# Patient Record
Sex: MALE | Race: WHITE
[De-identification: names, ages, dates, MRNs, and addresses within clinical notes are randomized per-mention and may not be internally consistent; named-entity substitution may affect disease eponyms.]

---

## 2017-03-22 ENCOUNTER — HOSPITAL ENCOUNTER (OUTPATIENT)
Dept: HOSPITAL 47 - RADCTMAIN | Age: 47
Discharge: HOME | End: 2017-03-22
Payer: COMMERCIAL

## 2017-03-22 DIAGNOSIS — S09.90XA: Primary | ICD-10-CM

## 2017-03-22 PROCEDURE — 70450 CT HEAD/BRAIN W/O DYE: CPT

## 2017-03-22 NOTE — CT
EXAMINATION TYPE: CT brain wo con

 

DATE OF EXAM: 3/22/2017 5:33 PM

 

COMPARISON: 3/17/2010

 

HISTORY: Right supraorbital injury 3 1/2 weeks ago.

 

CT DLP: 1084.80 mGycm

Automated exposure control for dose reduction was used.

 

FINDINGS: 

Ventricles and sulci appear normal. There is no mass effect nor midline shift. There is no sign of in
tracranial hemorrhage. The calvarium is intact.

 

IMPRESSION: 

Negative unenhanced head CT scan. No change. No evidence of left orbital abnormality.

## 2021-03-31 ENCOUNTER — HOSPITAL ENCOUNTER (INPATIENT)
Dept: HOSPITAL 47 - EC | Age: 51
LOS: 22 days | DRG: 871 | End: 2021-04-22
Attending: FAMILY MEDICINE | Admitting: FAMILY MEDICINE
Payer: COMMERCIAL

## 2021-03-31 VITALS — BODY MASS INDEX: 30.1 KG/M2

## 2021-03-31 DIAGNOSIS — Z88.2: ICD-10-CM

## 2021-03-31 DIAGNOSIS — R57.1: ICD-10-CM

## 2021-03-31 DIAGNOSIS — U07.1: ICD-10-CM

## 2021-03-31 DIAGNOSIS — B37.0: ICD-10-CM

## 2021-03-31 DIAGNOSIS — J30.2: ICD-10-CM

## 2021-03-31 DIAGNOSIS — N40.1: ICD-10-CM

## 2021-03-31 DIAGNOSIS — F41.0: ICD-10-CM

## 2021-03-31 DIAGNOSIS — Z98.890: ICD-10-CM

## 2021-03-31 DIAGNOSIS — E87.4: ICD-10-CM

## 2021-03-31 DIAGNOSIS — R74.8: ICD-10-CM

## 2021-03-31 DIAGNOSIS — Z66: ICD-10-CM

## 2021-03-31 DIAGNOSIS — A41.01: ICD-10-CM

## 2021-03-31 DIAGNOSIS — N17.0: ICD-10-CM

## 2021-03-31 DIAGNOSIS — Z87.19: ICD-10-CM

## 2021-03-31 DIAGNOSIS — I26.99: ICD-10-CM

## 2021-03-31 DIAGNOSIS — Z88.0: ICD-10-CM

## 2021-03-31 DIAGNOSIS — Z80.1: ICD-10-CM

## 2021-03-31 DIAGNOSIS — J93.9: ICD-10-CM

## 2021-03-31 DIAGNOSIS — Z87.01: ICD-10-CM

## 2021-03-31 DIAGNOSIS — Z80.8: ICD-10-CM

## 2021-03-31 DIAGNOSIS — Z90.49: ICD-10-CM

## 2021-03-31 DIAGNOSIS — G62.81: ICD-10-CM

## 2021-03-31 DIAGNOSIS — M54.5: ICD-10-CM

## 2021-03-31 DIAGNOSIS — D89.834: ICD-10-CM

## 2021-03-31 DIAGNOSIS — K21.9: ICD-10-CM

## 2021-03-31 DIAGNOSIS — E87.5: ICD-10-CM

## 2021-03-31 DIAGNOSIS — D72.810: ICD-10-CM

## 2021-03-31 DIAGNOSIS — E66.9: ICD-10-CM

## 2021-03-31 DIAGNOSIS — G89.29: ICD-10-CM

## 2021-03-31 DIAGNOSIS — Z79.899: ICD-10-CM

## 2021-03-31 DIAGNOSIS — J80: ICD-10-CM

## 2021-03-31 DIAGNOSIS — J15.211: ICD-10-CM

## 2021-03-31 DIAGNOSIS — Z80.41: ICD-10-CM

## 2021-03-31 DIAGNOSIS — I48.0: ICD-10-CM

## 2021-03-31 DIAGNOSIS — R39.11: ICD-10-CM

## 2021-03-31 DIAGNOSIS — J12.82: ICD-10-CM

## 2021-03-31 DIAGNOSIS — Z71.3: ICD-10-CM

## 2021-03-31 DIAGNOSIS — R65.21: ICD-10-CM

## 2021-03-31 DIAGNOSIS — A41.89: Primary | ICD-10-CM

## 2021-03-31 DIAGNOSIS — Z87.891: ICD-10-CM

## 2021-03-31 LAB
APTT BLD: 25.6 SEC (ref 22–30)
BASOPHILS # BLD AUTO: 0 K/UL (ref 0–0.2)
BASOPHILS NFR BLD AUTO: 0 %
EOSINOPHIL # BLD AUTO: 0 K/UL (ref 0–0.7)
EOSINOPHIL NFR BLD AUTO: 0 %
ERYTHROCYTE [DISTWIDTH] IN BLOOD BY AUTOMATED COUNT: 5.27 M/UL (ref 4.3–5.9)
ERYTHROCYTE [DISTWIDTH] IN BLOOD: 13.2 % (ref 11.5–15.5)
HCT VFR BLD AUTO: 45 % (ref 39–53)
HGB BLD-MCNC: 15 GM/DL (ref 13–17.5)
INR PPP: 0.9 (ref ?–1.2)
LYMPHOCYTES # SPEC AUTO: 0.8 K/UL (ref 1–4.8)
LYMPHOCYTES NFR SPEC AUTO: 13 %
MCH RBC QN AUTO: 28.5 PG (ref 25–35)
MCHC RBC AUTO-ENTMCNC: 33.3 G/DL (ref 31–37)
MCV RBC AUTO: 85.4 FL (ref 80–100)
MONOCYTES # BLD AUTO: 0.3 K/UL (ref 0–1)
MONOCYTES NFR BLD AUTO: 6 %
NEUTROPHILS # BLD AUTO: 4.6 K/UL (ref 1.3–7.7)
NEUTROPHILS NFR BLD AUTO: 79 %
PLATELET # BLD AUTO: 202 K/UL (ref 150–450)
PT BLD: 10.1 SEC (ref 9–12)
WBC # BLD AUTO: 5.8 K/UL (ref 3.8–10.6)

## 2021-03-31 PROCEDURE — 82805 BLOOD GASES W/O2 SATURATION: CPT

## 2021-03-31 PROCEDURE — 85610 PROTHROMBIN TIME: CPT

## 2021-03-31 PROCEDURE — 86900 BLOOD TYPING SEROLOGIC ABO: CPT

## 2021-03-31 PROCEDURE — 94003 VENT MGMT INPAT SUBQ DAY: CPT

## 2021-03-31 PROCEDURE — 99291 CRITICAL CARE FIRST HOUR: CPT

## 2021-03-31 PROCEDURE — 85730 THROMBOPLASTIN TIME PARTIAL: CPT

## 2021-03-31 PROCEDURE — 84100 ASSAY OF PHOSPHORUS: CPT

## 2021-03-31 PROCEDURE — 94002 VENT MGMT INPAT INIT DAY: CPT

## 2021-03-31 PROCEDURE — 82330 ASSAY OF CALCIUM: CPT

## 2021-03-31 PROCEDURE — 83605 ASSAY OF LACTIC ACID: CPT

## 2021-03-31 PROCEDURE — 94640 AIRWAY INHALATION TREATMENT: CPT

## 2021-03-31 PROCEDURE — 80202 ASSAY OF VANCOMYCIN: CPT

## 2021-03-31 PROCEDURE — 93306 TTE W/DOPPLER COMPLETE: CPT

## 2021-03-31 PROCEDURE — 82553 CREATINE MB FRACTION: CPT

## 2021-03-31 PROCEDURE — 87340 HEPATITIS B SURFACE AG IA: CPT

## 2021-03-31 PROCEDURE — 87070 CULTURE OTHR SPECIMN AEROBIC: CPT

## 2021-03-31 PROCEDURE — 83615 LACTATE (LD) (LDH) ENZYME: CPT

## 2021-03-31 PROCEDURE — 36410 VNPNXR 3YR/> PHY/QHP DX/THER: CPT

## 2021-03-31 PROCEDURE — 80048 BASIC METABOLIC PNL TOTAL CA: CPT

## 2021-03-31 PROCEDURE — 84145 PROCALCITONIN (PCT): CPT

## 2021-03-31 PROCEDURE — 83735 ASSAY OF MAGNESIUM: CPT

## 2021-03-31 PROCEDURE — 83625 ASSAY OF LDH ENZYMES: CPT

## 2021-03-31 PROCEDURE — 87077 CULTURE AEROBIC IDENTIFY: CPT

## 2021-03-31 PROCEDURE — 93970 EXTREMITY STUDY: CPT

## 2021-03-31 PROCEDURE — 85379 FIBRIN DEGRADATION QUANT: CPT

## 2021-03-31 PROCEDURE — 90935 HEMODIALYSIS ONE EVALUATION: CPT

## 2021-03-31 PROCEDURE — 85384 FIBRINOGEN ACTIVITY: CPT

## 2021-03-31 PROCEDURE — 84132 ASSAY OF SERUM POTASSIUM: CPT

## 2021-03-31 PROCEDURE — 86850 RBC ANTIBODY SCREEN: CPT

## 2021-03-31 PROCEDURE — 86901 BLOOD TYPING SEROLOGIC RH(D): CPT

## 2021-03-31 PROCEDURE — 87186 SC STD MICRODIL/AGAR DIL: CPT

## 2021-03-31 PROCEDURE — 85027 COMPLETE CBC AUTOMATED: CPT

## 2021-03-31 PROCEDURE — 86140 C-REACTIVE PROTEIN: CPT

## 2021-03-31 PROCEDURE — 84478 ASSAY OF TRIGLYCERIDES: CPT

## 2021-03-31 PROCEDURE — 86704 HEP B CORE ANTIBODY TOTAL: CPT

## 2021-03-31 PROCEDURE — 80053 COMPREHEN METABOLIC PANEL: CPT

## 2021-03-31 PROCEDURE — 85025 COMPLETE CBC W/AUTO DIFF WBC: CPT

## 2021-03-31 PROCEDURE — 94760 N-INVAS EAR/PLS OXIMETRY 1: CPT

## 2021-03-31 PROCEDURE — 86706 HEP B SURFACE ANTIBODY: CPT

## 2021-03-31 PROCEDURE — 94660 CPAP INITIATION&MGMT: CPT

## 2021-03-31 PROCEDURE — 82306 VITAMIN D 25 HYDROXY: CPT

## 2021-03-31 PROCEDURE — 93005 ELECTROCARDIOGRAM TRACING: CPT

## 2021-03-31 PROCEDURE — 36600 WITHDRAWAL OF ARTERIAL BLOOD: CPT

## 2021-03-31 PROCEDURE — 82550 ASSAY OF CK (CPK): CPT

## 2021-03-31 PROCEDURE — 87205 SMEAR GRAM STAIN: CPT

## 2021-03-31 PROCEDURE — 87040 BLOOD CULTURE FOR BACTERIA: CPT

## 2021-03-31 PROCEDURE — 87635 SARS-COV-2 COVID-19 AMP PRB: CPT

## 2021-03-31 PROCEDURE — 76937 US GUIDE VASCULAR ACCESS: CPT

## 2021-03-31 PROCEDURE — 36573 INSJ PICC RS&I 5 YR+: CPT

## 2021-03-31 PROCEDURE — 82728 ASSAY OF FERRITIN: CPT

## 2021-03-31 PROCEDURE — 71045 X-RAY EXAM CHEST 1 VIEW: CPT

## 2021-03-31 NOTE — ED
SOB HPI





- General


Chief Complaint: Shortness of Breath


Stated Complaint: SOB


Time Seen by Provider: 21 21:24


Source: patient, EMS, RN notes reviewed, old records reviewed


Mode of arrival: EMS





- History of Present Illness


Initial Comments: 





This is a 50-year-old female DF for evaluation about a week of symptoms known 

coronavirus, severe shortness of breath bodyaches pains weakness.  Cough 

congestion and not feeling well.  Patient states his cannot catch his breath.  

Severe body aches and pains.  Patient has no real medical history


MD Complaint: shortness of breath, cough, anxiety


-: days(s), week(s)


Radiation: back


Severity: moderate


Severity scale (1-10): 5


Quality: aching, throbbing


Consistency: constant


Improves With: nothing


Worsens With: nothing


Known History Of: other (none)


Context: recent URI, recent illness


Associated Symptoms: fever, cough, sputum production


Treatments Prior to Arrival: none





- Related Data


                                Home Medications











 Medication  Instructions  Recorded  Confirmed


 


Acetaminophen [Tylenol Arthritis] 650 mg PO Q4H PRN 21


 


Azithromycin [Zithromax Z-pack (6 See Taper PO AS DIRECTED 21





tabs)]   


 


Loratadine [Claritin] 10 mg PO DAILY 21


 


Ondansetron HCl [Zofran] 4 mg PO Q8H PRN 21


 


Tamsulosin HCl [Flomax] 0.4 mg PO DAILY 21


 


methylPREDNISolone Dose Pack See Taper PO AS DIRECTED 21





[Medrol Dose Pack]   











                                    Allergies











Allergy/AdvReac Type Severity Reaction Status Date / Time


 


amoxicillin Allergy  Rash/Hives Verified 21 22:23


 


Sulfa (Sulfonamide AdvReac  Anaphylaxis Verified 21 22:23





Antibiotics)     














Review of Systems


ROS Statement: 


Those systems with pertinent positive or pertinent negative responses have been 

documented in the HPI.





ROS Other: All systems not noted in ROS Statement are negative.





Past Medical History


History of Any Multi-Drug Resistant Organisms: None Reported


Past Surgical History: Cholecystectomy


Past Psychological History: No Psychological Hx Reported


Smoking Status: Former smoker


Past Alcohol Use History: None Reported


Past Drug Use History: None Reported





- Past Family History


  ** Father


Family Medical History: No Reported History





  ** Mother


Family Medical History: Cancer


Additional Family Medical History / Comment(s): Mother had magdiel/ovarian and bone

cancer.  She is .





General Exam


General appearance: alert, anxious, in distress


Head exam: Present: atraumatic, normocephalic, normal inspection


Eye exam: Present: normal appearance, PERRL, EOMI.  Absent: scleral icterus, 

conjunctival injection, periorbital swelling


ENT exam: Present: normal exam, mucous membranes moist


Neck exam: Present: normal inspection.  Absent: tenderness, meningismus, 

lymphadenopathy


Respiratory exam: Present: normal lung sounds bilaterally.  Absent: respiratory 

distress, wheezes, rales, rhonchi, stridor


Cardiovascular Exam: Present: regular rate, normal rhythm, normal heart sounds. 

Absent: systolic murmur, diastolic murmur, rubs, gallop, clicks


GI/Abdominal exam: Present: soft, normal bowel sounds.  Absent: distended, 

tenderness, guarding, rebound, rigid


Extremities exam: Present: normal inspection, full ROM, normal capillary refill.

 Absent: tenderness, pedal edema, joint swelling, calf tenderness


Back exam: Present: normal inspection


Neurological exam: Present: alert, oriented X3, CN II-XII intact


Psychiatric exam: Present: normal affect, normal mood


Skin exam: Present: warm, dry, intact, normal color.  Absent: rash





Course


                                   Vital Signs











  21





  21:34 21:44 05:24


 


Temperature 101.1 F H  97.8 F


 


Pulse Rate 84  71


 


Pulse Rate [   





Right Pulse   





Oximetery]   


 


Respiratory 23 19 22





Rate   


 


Blood Pressure 111/79  130/90


 


Blood Pressure   





[Left Arm]   


 


O2 Sat by Pulse 94 L  93 L





Oximetry   














  21





  08:00 13:47 16:06


 


Temperature 98.8 F 98.1 F 


 


Pulse Rate   


 


Pulse Rate [ 70 67 





Right Pulse   





Oximetery]   


 


Respiratory 20 20 





Rate   


 


Blood Pressure   


 


Blood Pressure 124/77 125/81 





[Left Arm]   


 


O2 Sat by Pulse 93 L 95 91 L





Oximetry   














  21





  18:05


 


Temperature 98.3 F


 


Pulse Rate 


 


Pulse Rate [ 82





Right Pulse 





Oximetery] 


 


Respiratory 20





Rate 


 


Blood Pressure 


 


Blood Pressure 122/78





[Left Arm] 


 


O2 Sat by Pulse 90 L





Oximetry 














- Reevaluation(s)


Reevaluation #1: 





Medical record is reviewed





Patient has has no improvement in symptoms here in the ER





Spoke patient regarding findings and results questions answered





Medical Decision Making





- Medical Decision Making





50 male DF for evaluation positive coronavirus with hypoxia.  Coronavirus 

pneumonia patient be admitted for persistent evaluation treatment and 

management, supportive care for coronavirus





- Lab Data


Result diagrams: 


                                 21 09:11





                                 21 09:11


                                   Lab Results











  21 Range/Units





  22:38 22:38 22:38 


 


WBC  5.8    (3.8-10.6)  k/uL


 


RBC  5.27    (4.30-5.90)  m/uL


 


Hgb  15.0    (13.0-17.5)  gm/dL


 


Hct  45.0    (39.0-53.0)  %


 


MCV  85.4    (80.0-100.0)  fL


 


MCH  28.5    (25.0-35.0)  pg


 


MCHC  33.3    (31.0-37.0)  g/dL


 


RDW  13.2    (11.5-15.5)  %


 


Plt Count  202    (150-450)  k/uL


 


MPV  7.7    


 


Neutrophils %  79    %


 


Lymphocytes %  13    %


 


Monocytes %  6    %


 


Eosinophils %  0    %


 


Basophils %  0    %


 


Neutrophils #  4.6    (1.3-7.7)  k/uL


 


Lymphocytes #  0.8 L    (1.0-4.8)  k/uL


 


Monocytes #  0.3    (0-1.0)  k/uL


 


Eosinophils #  0.0    (0-0.7)  k/uL


 


Basophils #  0.0    (0-0.2)  k/uL


 


PT   10.1   (9.0-12.0)  sec


 


INR   0.9   (<1.2)  


 


APTT   25.6   (22.0-30.0)  sec


 


Sodium    137  (137-145)  mmol/L


 


Potassium    3.7  (3.5-5.1)  mmol/L


 


Chloride    102  ()  mmol/L


 


Carbon Dioxide    25  (22-30)  mmol/L


 


Anion Gap    10  mmol/L


 


BUN    17  (9-20)  mg/dL


 


Creatinine    0.91  (0.66-1.25)  mg/dL


 


Est GFR (CKD-EPI)AfAm    >90  (>60 ml/min/1.73 sqM)  


 


Est GFR (CKD-EPI)NonAf    >90  (>60 ml/min/1.73 sqM)  


 


Glucose    106 H  (74-99)  mg/dL


 


Plasma Lactic Acid Emanuel     (0.7-2.0)  mmol/L


 


Calcium    8.8  (8.4-10.2)  mg/dL


 


Magnesium    1.6  (1.6-2.3)  mg/dL


 


Total Bilirubin    0.7  (0.2-1.3)  mg/dL


 


AST    102 H  (17-59)  U/L


 


ALT    107 H  (4-49)  U/L


 


Alkaline Phosphatase    67  ()  U/L


 


Lactate Dehydrogenase    710 H  (313-618)  U/L


 


C-Reactive Protein    133.9 H  (<10.0)  mg/L


 


Total Protein    7.2  (6.3-8.2)  g/dL


 


Albumin    3.9  (3.5-5.0)  g/dL














  21 Range/Units





  22:38 


 


WBC   (3.8-10.6)  k/uL


 


RBC   (4.30-5.90)  m/uL


 


Hgb   (13.0-17.5)  gm/dL


 


Hct   (39.0-53.0)  %


 


MCV   (80.0-100.0)  fL


 


MCH   (25.0-35.0)  pg


 


MCHC   (31.0-37.0)  g/dL


 


RDW   (11.5-15.5)  %


 


Plt Count   (150-450)  k/uL


 


MPV   


 


Neutrophils %   %


 


Lymphocytes %   %


 


Monocytes %   %


 


Eosinophils %   %


 


Basophils %   %


 


Neutrophils #   (1.3-7.7)  k/uL


 


Lymphocytes #   (1.0-4.8)  k/uL


 


Monocytes #   (0-1.0)  k/uL


 


Eosinophils #   (0-0.7)  k/uL


 


Basophils #   (0-0.2)  k/uL


 


PT   (9.0-12.0)  sec


 


INR   (<1.2)  


 


APTT   (22.0-30.0)  sec


 


Sodium   (137-145)  mmol/L


 


Potassium   (3.5-5.1)  mmol/L


 


Chloride   ()  mmol/L


 


Carbon Dioxide   (22-30)  mmol/L


 


Anion Gap   mmol/L


 


BUN   (9-20)  mg/dL


 


Creatinine   (0.66-1.25)  mg/dL


 


Est GFR (CKD-EPI)AfAm   (>60 ml/min/1.73 sqM)  


 


Est GFR (CKD-EPI)NonAf   (>60 ml/min/1.73 sqM)  


 


Glucose   (74-99)  mg/dL


 


Plasma Lactic Acid Emanuel  1.6  (0.7-2.0)  mmol/L


 


Calcium   (8.4-10.2)  mg/dL


 


Magnesium   (1.6-2.3)  mg/dL


 


Total Bilirubin   (0.2-1.3)  mg/dL


 


AST   (17-59)  U/L


 


ALT   (4-49)  U/L


 


Alkaline Phosphatase   ()  U/L


 


Lactate Dehydrogenase   (313-618)  U/L


 


C-Reactive Protein   (<10.0)  mg/L


 


Total Protein   (6.3-8.2)  g/dL


 


Albumin   (3.5-5.0)  g/dL














- EKG Data


-: EKG Interpreted by Me (EKG shows sinus rhythm 80   )





- Radiology Data


Radiology results: report reviewed (Chest x-rays positive for coronavirus with 

pneumonia), image reviewed





Critical Care Time


Critical Care Time: Yes


Total Critical Care Time: 31





Disposition


Clinical Impression: 


 Pneumonia due to COVID-19 virus, Fever, Hypoxia, Coronavirus infection





Disposition: ADMITTED AS IP TO THIS HOSP


Condition: Fair


Is patient prescribed a controlled substance at d/c from ED?: No

## 2021-04-01 LAB
ALBUMIN SERPL-MCNC: 3.9 G/DL (ref 3.5–5)
ALBUMIN SERPL-MCNC: 3.9 G/DL (ref 3.5–5)
ALP SERPL-CCNC: 63 U/L (ref 38–126)
ALP SERPL-CCNC: 67 U/L (ref 38–126)
ALT SERPL-CCNC: 104 U/L (ref 4–49)
ALT SERPL-CCNC: 107 U/L (ref 4–49)
ANION GAP SERPL CALC-SCNC: 10 MMOL/L
ANION GAP SERPL CALC-SCNC: 8 MMOL/L
AST SERPL-CCNC: 102 U/L (ref 17–59)
AST SERPL-CCNC: 96 U/L (ref 17–59)
BASOPHILS # BLD AUTO: 0 K/UL (ref 0–0.2)
BASOPHILS NFR BLD AUTO: 0 %
BUN SERPL-SCNC: 17 MG/DL (ref 9–20)
BUN SERPL-SCNC: 17 MG/DL (ref 9–20)
CALCIUM SPEC-MCNC: 8.7 MG/DL (ref 8.4–10.2)
CALCIUM SPEC-MCNC: 8.8 MG/DL (ref 8.4–10.2)
CHLORIDE SERPL-SCNC: 102 MMOL/L (ref 98–107)
CHLORIDE SERPL-SCNC: 104 MMOL/L (ref 98–107)
CO2 SERPL-SCNC: 24 MMOL/L (ref 22–30)
CO2 SERPL-SCNC: 25 MMOL/L (ref 22–30)
EOSINOPHIL # BLD AUTO: 0 K/UL (ref 0–0.7)
EOSINOPHIL NFR BLD AUTO: 1 %
ERYTHROCYTE [DISTWIDTH] IN BLOOD BY AUTOMATED COUNT: 5.35 M/UL (ref 4.3–5.9)
ERYTHROCYTE [DISTWIDTH] IN BLOOD: 13 % (ref 11.5–15.5)
GLUCOSE SERPL-MCNC: 106 MG/DL (ref 74–99)
GLUCOSE SERPL-MCNC: 154 MG/DL (ref 74–99)
HCT VFR BLD AUTO: 46.2 % (ref 39–53)
HGB BLD-MCNC: 16 GM/DL (ref 13–17.5)
LDH SPEC-CCNC: 710 U/L (ref 313–618)
LDH SPEC-CCNC: 723 U/L (ref 313–618)
LYMPHOCYTES # SPEC AUTO: 0.4 K/UL (ref 1–4.8)
LYMPHOCYTES NFR SPEC AUTO: 10 %
MAGNESIUM SPEC-SCNC: 1.6 MG/DL (ref 1.6–2.3)
MCH RBC QN AUTO: 29.8 PG (ref 25–35)
MCHC RBC AUTO-ENTMCNC: 34.5 G/DL (ref 31–37)
MCV RBC AUTO: 86.4 FL (ref 80–100)
MONOCYTES # BLD AUTO: 0.2 K/UL (ref 0–1)
MONOCYTES NFR BLD AUTO: 4 %
NEUTROPHILS # BLD AUTO: 3.5 K/UL (ref 1.3–7.7)
NEUTROPHILS NFR BLD AUTO: 84 %
PLATELET # BLD AUTO: 201 K/UL (ref 150–450)
POTASSIUM SERPL-SCNC: 3.7 MMOL/L (ref 3.5–5.1)
POTASSIUM SERPL-SCNC: 4.4 MMOL/L (ref 3.5–5.1)
PROT SERPL-MCNC: 7.2 G/DL (ref 6.3–8.2)
PROT SERPL-MCNC: 7.3 G/DL (ref 6.3–8.2)
SODIUM SERPL-SCNC: 136 MMOL/L (ref 137–145)
SODIUM SERPL-SCNC: 137 MMOL/L (ref 137–145)
WBC # BLD AUTO: 4.2 K/UL (ref 3.8–10.6)

## 2021-04-01 PROCEDURE — 5A0945A ASSISTANCE WITH RESPIRATORY VENTILATION, 24-96 CONSECUTIVE HOURS, HIGH NASAL FLOW/VELOCITY: ICD-10-PCS

## 2021-04-01 RX ADMIN — OXYCODONE HYDROCHLORIDE AND ACETAMINOPHEN SCH MG: 500 TABLET ORAL at 11:21

## 2021-04-01 RX ADMIN — ONDANSETRON PRN MG: 2 INJECTION INTRAMUSCULAR; INTRAVENOUS at 21:21

## 2021-04-01 RX ADMIN — ACETAMINOPHEN PRN MG: 325 TABLET, FILM COATED ORAL at 21:21

## 2021-04-01 RX ADMIN — DEXTROSE SCH MG: 50 INJECTION, SOLUTION INTRAVENOUS at 11:23

## 2021-04-01 RX ADMIN — Medication SCH MG: at 11:21

## 2021-04-01 RX ADMIN — ALBUTEROL SULFATE PRN PUFF: 90 AEROSOL, METERED RESPIRATORY (INHALATION) at 16:05

## 2021-04-01 RX ADMIN — TAMSULOSIN HYDROCHLORIDE SCH MG: 0.4 CAPSULE ORAL at 11:21

## 2021-04-01 RX ADMIN — ENOXAPARIN SODIUM SCH MG: 40 INJECTION SUBCUTANEOUS at 11:24

## 2021-04-01 RX ADMIN — ALBUTEROL SULFATE PRN PUFF: 90 AEROSOL, METERED RESPIRATORY (INHALATION) at 05:23

## 2021-04-01 RX ADMIN — ALBUTEROL SULFATE PRN PUFF: 90 AEROSOL, METERED RESPIRATORY (INHALATION) at 20:55

## 2021-04-01 RX ADMIN — Medication SCH MCG: at 11:21

## 2021-04-01 NOTE — P.CNPUL
History of Present Illness


Consult date: 21


Requesting physician: Tyrone Rubi


Reason for consult: dyspnea, cough, hypoxemia, pneumonia, abnormal CXR/CT


Chief complaint: Shortness of breath, desaturation, fever.


History of present illness: 





50-year-old male, with history of shortness of breath, who presents to the 

emergency department on , a little after 9:00 PM.  He apparently was 

brought in by EMS.  I saw the patient in the emergency room, in room 33.  He was

on O2 several liters by nasal cannula and not receiving any IV fluids.  He has 

been sick for about 11 or 12 days.  He apparently just tested positive today.  

His symptoms included shortness of breath, weakness, fatigue, low saturations, 

and fever.  As an outpatient, he was on Tylenol, a Z-Alex, Claritin, Zofran, 

Flomax, and a Medrol Dosepak.  He does have ALLERGIES to penicillin antibiotics,

and sulfa antibiotics.  The patient was quite fatigued when I saw him in the 

emergency room.  He could barely open his eyes.  He was not demonstrating any 

signs or symptoms of respiratory compromise, and did not have any conversational

dyspnea or accessory muscle use.  His white count was 4.2, hemoglobin 16, 

hematocrit 46.2, and platelet count 201,000.  PT/INR PTT were normal.  Sodium 

136, potassium 4.4, chlorides 104, CO2 24, anion gap 8, BUN 17, and creatinine 

0.94.  AST was 96, FiO2 104, LDH was 723, and C-reactive protein was 133.9.  

There was no d-dimer ordered.  A chest x-ray did reveal bilateral infiltrates.





Review of Systems





REVIEW OF SYSTEMS:  


CONSTITUTIONAL: Weakness, fatigue, lethargy.


NEUROLOGIC: [ Negative.]


HEENT:  [ Negative.]


CARDIAC:  [Negative.]


PULMONARY: Shortness of breath, low saturations, cough


GI: Decreased oral intake.


:  [Negative.]


RHEUMATOLOGIC: [ Negative.]


IMMUNOLOGIC: [ Negative.]


ENDOCRINE:  [Negative.  ] 


DERMATOLOGIC: [Negative.]








Past Medical History


Past Medical History: GERD/Reflux, Pneumonia


Additional Past Medical History / Comment(s): [facility] 

[facility].      Other hx; Bronchitis, chronic low back pain, urinary hesitency


History of Any Multi-Drug Resistant Organisms: None Reported


Past Surgical History: Cholecystectomy


Past Anesthesia/Blood Transfusion Reactions: No Reported Reaction


Smoking Status: Former smoker





- Past Family History


  ** Father


Family Medical History: No Reported History





  ** Mother


Family Medical History: Cancer


Additional Family Medical History / Comment(s): Mother had magdiel/ovarian and bone

 cancer.  She is .





Medications and Allergies


                                Home Medications











 Medication  Instructions  Recorded  Confirmed  Type


 


Acetaminophen [Tylenol Arthritis] 650 mg PO Q4H PRN 21 History


 


Azithromycin [Zithromax Z-pack (6 See Taper PO AS DIRECTED 21 

History





tabs)]    


 


Loratadine [Claritin] 10 mg PO DAILY 21 History


 


Ondansetron HCl [Zofran] 4 mg PO Q8H PRN 21 History


 


Tamsulosin HCl [Flomax] 0.4 mg PO DAILY 21 History


 


methylPREDNISolone Dose Pack See Taper PO AS DIRECTED 21 History





[Medrol Dose Pack]    








                                    Allergies











Allergy/AdvReac Type Severity Reaction Status Date / Time


 


amoxicillin Allergy  Rash/Hives Verified 21 22:23


 


Sulfa (Sulfonamide AdvReac  Anaphylaxis Verified 21 22:23





Antibiotics)     














Physical Exam


Osteopathic Statement: *.  No significant issues noted on an osteopathic 

structural exam other than those noted in the History and Physical/Consult.


Vitals: 


                                   Vital Signs











  Temp Pulse Pulse Resp BP BP Pulse Ox


 


 21 08:00  98.8 F   70  20   124/77  93 L


 


 21 05:24  97.8 F  71   22  130/90   93 L


 


 21 21:44     19   


 


 21 21:34  101.1 F H  84   23  111/79   94 L








                                Intake and Output











 21





 22:59 06:59 14:59


 


Other:   


 


  Weight 112.491 kg  112.491 kg














No acute distress, oriented 3.  6 L saturation is 93%.  No conversational 

dyspnea or use of accessory muscles.





HEENT examination is grossly unremarkable.  Mucous membranes are moist.  No oral

lesions.





Neck supple.  Full range of motion.  No adenopathy thyromegaly or neck vein 

distention.





Cardiovascular examination reveals regular rhythm rate.  S1-S2 normal.  No S3 or

S4.  No discernible murmur noted.  Heart rate 71.  Heart sounds are distant.





Lungs reveal coarse bilateral rhonchi and a few scattered crackles.  No wheezes.

 Breath sounds equal bilaterally.





Abdomen soft bowel sounds are heard.  No masses or tenderness.





Extremities are intact.  No cyanosis clubbing or edema.





Skin is without rash or lesion.





Neurologic examination is brief but nonfocal.





Results





- Laboratory Findings


CBC and BMP: 


                                 21 08:06





                                 21 08:06


PT/INR, D-dimer











PT  10.1 sec (9.0-12.0)   21  22:38    


 


INR  0.9  (<1.2)   21  22:38    








Abnormal lab findings: 


                                  Abnormal Labs











  21





  22:38 22:38 00:13


 


Lymphocytes #  0.8 L  


 


Sodium   


 


Glucose   106 H 


 


AST   102 H 


 


ALT   107 H 


 


Lactate Dehydrogenase   710 H 


 


C-Reactive Protein   133.9 H 


 


Coronavirus (PCR)    Detected A














  21





  08:06 08:06


 


Lymphocytes #  0.4 L 


 


Sodium   136 L


 


Glucose   154 H


 


AST   96 H


 


ALT   104 H


 


Lactate Dehydrogenase   723 H


 


C-Reactive Protein  


 


Coronavirus (PCR)  














- Diagnostic Findings


Chest x-ray: image reviewed





Assessment and Plan


Assessment: 





Acute hypoxemic respiratory failure secondary to COVID 19 pneumonia.





Previous history of tobacco use, without evidence of chronic lung disease.





No other significant past medical history.


Plan: 





Plan dated 2021.





The patient is clearly beyond the window for REM.  The patient should receive 

vitamin C, vitamin D3, and zinc.  A D-dimer test should be ordered.  In 

addition, the patient should receive Decadron and Lovenox.  Additional 

recommendations suggestions are forthcoming.  Chest x-ray day or 2.  

Inflammatory markers should be measured every 2 or 3 days.  We will continue to 

follow make recommendations were appropriate.  Prognosis is guarded.


Time with Patient: Greater than 30

## 2021-04-01 NOTE — P.HPIM
History of Present Illness


H&P Date: 21


Chief Complaint: shortness of breath





Malcom Griffith is a 49 yo M with PMH of allergies who presented to the ED via EMS

with worsening malaise and shortness of breath. He states he developed a cough 

and sore throat about 2 weeks ago which has worsened since then. He complains of

fever chills and shortness of breath. On arrival he was febrile tachypenic and 

hypoxic SpO2 94% on 4 L O2. WBC 5.8, lymphopenia, , , COVID 

positive, CXR with coarse bilateral infiltrates.





Review of Systems


All systems: negative


Constitutional: Reports malaise, Reports sweats, Reports weakness, Denies 

chills, Denies fever


Eyes: denies blurred vision, denies pain


Ears, nose, mouth and throat: Denies headache, Denies sore throat


Cardiovascular: Denies chest pain, Denies shortness of breath


Respiratory: Reports cough, Reports dyspnea, Reports wheezing


Gastrointestinal: Denies abdominal pain, Denies diarrhea, Denies nausea, Denies 

vomiting


Musculoskeletal: Denies myalgias


Integumentary: Denies pruritus, Denies rash


Neurological: Denies numbness, Denies weakness


Psychiatric: Denies anxiety, Denies depression


Endocrine: Denies fatigue, Denies weight change





Past Medical History


Past Medical History: GERD/Reflux, Pneumonia


Additional Past Medical History / Comment(s): Pt tested covid+ on 21 [facility] 

ER.      Other hx; Bronchitis, chronic low back pain, urinary hesitency


History of Any Multi-Drug Resistant Organisms: None Reported


Past Surgical History: Cholecystectomy


Past Anesthesia/Blood Transfusion Reactions: No Reported Reaction


Smoking Status: Former smoker





- Past Family History


  ** Father


Family Medical History: No Reported History





  ** Mother


Family Medical History: Cancer


Additional Family Medical History / Comment(s): Mother had magdiel/ovarian and bone

cancer.  She is .





Medications and Allergies


                                Home Medications











 Medication  Instructions  Recorded  Confirmed  Type


 


Acetaminophen [Tylenol Arthritis] 650 mg PO Q4H PRN 21 History


 


Azithromycin [Zithromax Z-pack (6 See Taper PO AS DIRECTED 21 

History





tabs)]    


 


Loratadine [Claritin] 10 mg PO DAILY 21 History


 


Ondansetron HCl [Zofran] 4 mg PO Q8H PRN 21 History


 


Tamsulosin HCl [Flomax] 0.4 mg PO DAILY 21 History


 


methylPREDNISolone Dose Pack See Taper PO AS DIRECTED 21 History





[Medrol Dose Pack]    








                                    Allergies











Allergy/AdvReac Type Severity Reaction Status Date / Time


 


amoxicillin Allergy  Rash/Hives Verified 21 22:23


 


Sulfa (Sulfonamide AdvReac  Anaphylaxis Verified 21 22:23





Antibiotics)     














Physical Exam


Vitals: 


                                   Vital Signs











  Temp Pulse Pulse Resp BP BP Pulse Ox


 


 21 18:05  98.3 F   82  20   122/78  90 L


 


 21 16:06        91 L


 


 21 13:47  98.1 F   67  20   125/81  95


 


 21 08:00  98.8 F   70  20   124/77  93 L


 


 21 05:24  97.8 F  71   22  130/90   93 L


 


 21 21:44     19   


 


 21 21:34  101.1 F H  84   23  111/79   94 L








                                Intake and Output











 21





 06:59 14:59 22:59


 


Other:   


 


  Weight  112.491 kg 














General: fatigued, diaphoretic, in moderate distress. Vitals reviewed


Eyes: PERRL, EOMI, conjunctiva normal


HENT: normocephalic, mucus membranes moist


Neck: supple, no JVD


Lungs: tachypnea, wheezing, no rhonchi or rales


CV: Regular rate and rhythm, no murmur. Peripheral pulses 2+


Abdomen: soft, nondistended, no organomegaly


Lymph: no cervical or axillary LAD


Skin: warm and dry.


Neuro: A&Ox3, normal mood and affect





Results


CBC & Chem 7: 


                                 21 08:06





                                 21 08:06


Labs: 


                  Abnormal Lab Results - Last 24 Hours (Table)











  21 Range/Units





  22:38 22:38 00:13 


 


Lymphocytes #  0.8 L    (1.0-4.8)  k/uL


 


Sodium     (137-145)  mmol/L


 


Glucose   106 H   (74-99)  mg/dL


 


AST   102 H   (17-59)  U/L


 


ALT   107 H   (4-49)  U/L


 


Lactate Dehydrogenase   710 H   (313-618)  U/L


 


C-Reactive Protein   133.9 H   (<10.0)  mg/L


 


Coronavirus (PCR)    Detected A  (Not Detectd)  














  21 Range/Units





  08:06 08:06 


 


Lymphocytes #  0.4 L   (1.0-4.8)  k/uL


 


Sodium   136 L  (137-145)  mmol/L


 


Glucose   154 H  (74-99)  mg/dL


 


AST   96 H  (17-59)  U/L


 


ALT   104 H  (4-49)  U/L


 


Lactate Dehydrogenase   723 H  (313-618)  U/L


 


C-Reactive Protein    (<10.0)  mg/L


 


Coronavirus (PCR)    (Not Detectd)  














Thrombosis Risk Factor Assmnt





- Choose All That Apply


Any of the Below Risk Factors Present?: Yes


Each Factor Represents 1 point: Age 41-60 years, Obesity (BMI >25), Serious lung

disease incl. pneumonia (< 1month)


Other Risk Factors: No


Other congenital or acquired thrombophilia - If yes, enter type in comment: No


Thrombosis Risk Factor Assessment Total Risk Factor Score: 3


Thrombosis Risk Factor Assessment Level: Moderate Risk





Assessment and Plan


(1) Severe sepsis


Current Visit: Yes   Status: Acute   Code(s): A41.9 - SEPSIS, UNSPECIFIED 

ORGANISM; R65.20 - SEVERE SEPSIS WITHOUT SEPTIC SHOCK   SNOMED Code(s): 90472264


   





(2) Acute hypoxemic respiratory failure due to COVID-19


Current Visit: Yes   Status: Acute   Code(s): U07.1 - COVID-19; J96.01 - ACUTE 

RESPIRATORY FAILURE WITH HYPOXIA   SNOMED Code(s): 940086651


   





(3) Fever


Current Visit: Yes   Status: Acute   Code(s): R50.9 - FEVER, UNSPECIFIED   

SNOMED Code(s): 895360744


   





(4) Pneumonia due to COVID-19 virus


Current Visit: Yes   Status: Acute   Code(s): U07.1 - COVID-19; J12.82 - 

Pneumonia due to coronavirus disease 2019   SNOMED Code(s): 676876171816414320


   


Plan: 





1. Severe sepsis secondary to COVID pneumonia. Admit and consult pulmonary. 

Start vit C, vit D, dexamethasone, zinc. Lovenox for DVT prophylaxis. Zofran prn

## 2021-04-02 RX ADMIN — Medication SCH MG: at 07:30

## 2021-04-02 RX ADMIN — ALBUTEROL SULFATE PRN PUFF: 90 AEROSOL, METERED RESPIRATORY (INHALATION) at 07:44

## 2021-04-02 RX ADMIN — PANTOPRAZOLE SODIUM SCH MG: 40 INJECTION, POWDER, FOR SOLUTION INTRAVENOUS at 11:05

## 2021-04-02 RX ADMIN — ALBUTEROL SULFATE PRN PUFF: 90 AEROSOL, METERED RESPIRATORY (INHALATION) at 03:25

## 2021-04-02 RX ADMIN — ACETAMINOPHEN PRN MG: 325 TABLET, FILM COATED ORAL at 17:16

## 2021-04-02 RX ADMIN — ENOXAPARIN SODIUM SCH MG: 40 INJECTION SUBCUTANEOUS at 07:30

## 2021-04-02 RX ADMIN — ACETAMINOPHEN PRN MG: 325 TABLET, FILM COATED ORAL at 02:54

## 2021-04-02 RX ADMIN — TAMSULOSIN HYDROCHLORIDE SCH MG: 0.4 CAPSULE ORAL at 07:30

## 2021-04-02 RX ADMIN — Medication SCH MCG: at 07:29

## 2021-04-02 RX ADMIN — DEXTROSE SCH MG: 50 INJECTION, SOLUTION INTRAVENOUS at 07:29

## 2021-04-02 RX ADMIN — ALBUTEROL SULFATE PRN PUFF: 90 AEROSOL, METERED RESPIRATORY (INHALATION) at 12:45

## 2021-04-02 RX ADMIN — OXYCODONE HYDROCHLORIDE AND ACETAMINOPHEN SCH MG: 500 TABLET ORAL at 07:29

## 2021-04-02 NOTE — P.PN
Subjective


Progress Note Date: 04/02/21


Malcom Griffith is a 49 yo M with PMH of allergies who presented to the ED via EMS

with worsening malaise and shortness of breath. He states he developed a cough 

and sore throat about 2 weeks ago which has worsened since then. He complains of

fever chills and shortness of breath. On arrival he was febrile tachypenic and 

hypoxic SpO2 94% on 4 L O2. WBC 5.8, lymphopenia, , , COVID 

positive, CXR with coarse bilateral infiltrates.





[id number] maintained on 90% airflow, O2 sats in the low 90s.  Positive 

nonproductive cough .  Outside window for Remdesevir, continue on steroids, 

vitamins,lovenox. Chest pain, palpitations, complains of chronic back pain.  T-

max 100.2.  Preliminary blood cultures reporting no growth at 24 hours





Objective





- Vital Signs


Vital signs: 


                                   Vital Signs











Temp  97.9 F   04/02/21 10:03


 


Pulse  72   04/02/21 10:03


 


Resp  23   04/02/21 10:03


 


BP  111/71   04/02/21 10:03


 


Pulse Ox  93 L  04/02/21 10:03








                                 Intake & Output











 04/01/21 04/02/21 04/02/21





 18:59 06:59 18:59


 


Weight 112.491 kg  


 


Other:   


 


  Voiding Method  Urinal 


 


  # Voids  1 


 


  # Bowel Movements  1 














- Exam





General: Sitting up in bed, wearing airflow, respiratory effort increased


Eyes: PERRL, EOMI, conjunctiva normal


HENT: normocephalic, mucus membranes moist


Neck: supple, no JVD


Lungs: Bilateral bases diminished, no rhonchi or rales


CV: Regular rate and rhythm, no murmur. Peripheral pulses 2+


Abdomen: soft, nondistended, no organomegy, positive bowel sounds


Skin: warm and dry.


Neuro: A&Ox3, normal mood and affect








- Labs


CBC & Chem 7: 


                                 04/01/21 08:06





                                 04/01/21 08:06


Labs: 


                      Microbiology - Last 24 Hours (Table)











 03/31/21 22:38 Blood Culture - Preliminary





 Blood    No Growth after 24 hours


 


 03/31/21 22:53 Blood Culture - Preliminary





 Blood    No Growth after 24 hours














Assessment and Plan


Assessment: 


Severe sepsis secondary to Covid pneumonia, beyond window for Remdesevir





Acute hypoxic respiratory failure secondary to the above, airvo dependent








Gastroesophageal reflux disease





Chronic low back pain








Former nicotine dependence




















Plan: Continue on current medication regime ,monitoring and symptomatic 

treatment.  Maintain Covid regimen.  Ultram for chronic back pain.  Follow 

closely with pulmonary.  Prognosis guarded.

















The impression and plan of care has been dictated as directed.





:


I performed a history and examination of this patient,  discussed the same with 

the dictator.  I agree with the dictator's note ,documented as a scribe.  Any ad

ditional findings or plans will be noted.

## 2021-04-02 NOTE — P.PN
Subjective


Progress Note Date: 04/02/21


Principal diagnosis: 





CoVID 19 pneumonia





50-year-old male, with history of shortness of breath, who presents to the 

emergency department on March 31, a little after 9:00 PM.  He apparently was 

brought in by EMS.  I saw the patient in the emergency room, in room 33.  He was

on O2 several liters by nasal cannula and not receiving any IV fluids.  He has 

been sick for about 11 or 12 days.  He apparently just tested positive today.  

His symptoms included shortness of breath, weakness, fatigue, low saturations, 

and fever.  As an outpatient, he was on Tylenol, a Z-Alex, Claritin, Zofran, 

Flomax, and a Medrol Dosepak.  He does have ALLERGIES to penicillin antibiotics,

and sulfa antibiotics.  The patient was quite fatigued when I saw him in the 

emergency room.  He could barely open his eyes.  He was not demonstrating any 

signs or symptoms of respiratory compromise, and did not have any conversational

dyspnea or accessory muscle use.  His white count was 4.2, hemoglobin 16, he

matocrit 46.2, and platelet count 201,000.  PT/INR PTT were normal.  Sodium 136,

potassium 4.4, chlorides 104, CO2 24, anion gap 8, BUN 17, and creatinine 0.94. 

AST was 96, FiO2 104, LDH was 723, and C-reactive protein was 133.9.  There was 

no d-dimer ordered.  A chest x-ray did reveal bilateral infiltrates.





The patient is seen today 04/02/2021 in follow-up on the regular medical floor. 

He is currently awake, alert, in no acute distress.  He is still requiring AirVo

high flow nasal cannula at 60 L and 90% FiO2 to maintain O2 saturations in the 

low 90s.  He is currently afebrile.  Hemodynamically stable.  Blood cultures 

reveal no growth.  He remains on Lovenox, Decadron, vitamin supplements. 





Objective





- Vital Signs


Vital signs: 


                                   Vital Signs











Temp  98.7 F   04/02/21 14:00


 


Pulse  77   04/02/21 14:00


 


Resp  25 H  04/02/21 14:00


 


BP  114/80   04/02/21 14:00


 


Pulse Ox  91 L  04/02/21 15:50








                                 Intake & Output











 04/01/21 04/02/21 04/02/21





 18:59 06:59 18:59


 


Weight 112.491 kg  112.491 kg


 


Other:   


 


  Voiding Method  Urinal 


 


  # Voids  1 


 


  # Bowel Movements  1 














- Exam





GENERAL EXAM: Alert, pleasant 50-year-old gentleman, on AirVo high flow oxygen 

at 60 L and 90% FiO2 comfortable in no apparent distress.


HEAD: Normocephalic.


EYES: Normal reaction of pupils, equal size.


NOSE: Clear with pink turbinates.


THROAT: No erythema or exudates.


NECK: No masses, no JVD.


CHEST: No chest wall deformity.


LUNGS: Equal air entry with basilar crackles.


CVS: S1 and S2 normal with no audible murmur, regular rhythm.


ABDOMEN: No hepatosplenomegaly, normal bowel sounds, no guarding or rigidity.


SPINE: No scoliosis or deformity


SKIN: No rashes


CENTRAL NERVOUS SYSTEM: No focal deficits, tone is normal in all 4 extremities.


EXTREMITIES: There is no peripheral edema.  No clubbing, no cyanosis.  

Peripheral pulses are intact.





- Labs


CBC & Chem 7: 


                                 04/01/21 08:06





                                 04/01/21 08:06


Labs: 


                      Microbiology - Last 24 Hours (Table)











 03/31/21 22:38 Blood Culture - Preliminary





 Blood    No Growth after 24 hours


 


 03/31/21 22:53 Blood Culture - Preliminary





 Blood    No Growth after 24 hours














Assessment and Plan


Assessment: 





1 Acute hypoxic respiratory failure secondary to CoVID 19 pneumonia.  Outside 

the window for Remdesivir





2 History of chronic tobacco dependence





Plan: 





The patient was seen and evaluated by Dr. Gamino.


We will continue to titrate down the FiO2 as tolerated


Continue Lovenox, Decadron, vitamin supplements


Follow-up chest x-ray, inflammatory markers in the a.m.


We will continue to follow





I, the cosigning physician, performed a history & physical examination of the 

patient. Lungs sounds with crackles in the bilateral posterior bases.  

Maintaining good O2 saturations in the 90s on AirVo high flow oxygen at 60 L and

90% FiO2.  I discussed the assessment and plan of care with my nurse 

practitioner, Africa Boudreaux. I attest to the above note as dictated by her.

## 2021-04-03 LAB
ALBUMIN SERPL-MCNC: 3.7 G/DL (ref 3.5–5)
ALBUMIN/GLOB SERPL: 1 {RATIO}
ALP SERPL-CCNC: 80 U/L (ref 38–126)
ALT SERPL-CCNC: 80 U/L (ref 4–49)
ANION GAP SERPL CALC-SCNC: 9 MMOL/L
AST SERPL-CCNC: 67 U/L (ref 17–59)
BASOPHILS # BLD AUTO: 0.1 K/UL (ref 0–0.2)
BASOPHILS NFR BLD AUTO: 1 %
BUN SERPL-SCNC: 31 MG/DL (ref 9–20)
CALCIUM SPEC-MCNC: 9 MG/DL (ref 8.4–10.2)
CHLORIDE SERPL-SCNC: 106 MMOL/L (ref 98–107)
CO2 SERPL-SCNC: 26 MMOL/L (ref 22–30)
EOSINOPHIL # BLD AUTO: 0.1 K/UL (ref 0–0.7)
EOSINOPHIL NFR BLD AUTO: 1 %
ERYTHROCYTE [DISTWIDTH] IN BLOOD BY AUTOMATED COUNT: 5.49 M/UL (ref 4.3–5.9)
ERYTHROCYTE [DISTWIDTH] IN BLOOD: 12.8 % (ref 11.5–15.5)
GLOBULIN SER CALC-MCNC: 3.6 G/DL
GLUCOSE BLD-MCNC: 131 MG/DL (ref 75–99)
GLUCOSE SERPL-MCNC: 141 MG/DL (ref 74–99)
HCT VFR BLD AUTO: 47.4 % (ref 39–53)
HGB BLD-MCNC: 16.2 GM/DL (ref 13–17.5)
LDH SPEC-CCNC: 964 U/L (ref 313–618)
LYMPHOCYTES # SPEC AUTO: 0.3 K/UL (ref 1–4.8)
LYMPHOCYTES NFR SPEC AUTO: 3 %
MCH RBC QN AUTO: 29.6 PG (ref 25–35)
MCHC RBC AUTO-ENTMCNC: 34.3 G/DL (ref 31–37)
MCV RBC AUTO: 86.4 FL (ref 80–100)
MONOCYTES # BLD AUTO: 0.3 K/UL (ref 0–1)
MONOCYTES NFR BLD AUTO: 3 %
NEUTROPHILS # BLD AUTO: 9 K/UL (ref 1.3–7.7)
NEUTROPHILS NFR BLD AUTO: 91 %
PLATELET # BLD AUTO: 257 K/UL (ref 150–450)
POTASSIUM SERPL-SCNC: 4.4 MMOL/L (ref 3.5–5.1)
PROT SERPL-MCNC: 7.3 G/DL (ref 6.3–8.2)
SODIUM SERPL-SCNC: 141 MMOL/L (ref 137–145)
WBC # BLD AUTO: 10 K/UL (ref 3.8–10.6)

## 2021-04-03 PROCEDURE — XW033H5 INTRODUCTION OF TOCILIZUMAB INTO PERIPHERAL VEIN, PERCUTANEOUS APPROACH, NEW TECHNOLOGY GROUP 5: ICD-10-PCS

## 2021-04-03 PROCEDURE — 5A09457 ASSISTANCE WITH RESPIRATORY VENTILATION, 24-96 CONSECUTIVE HOURS, CONTINUOUS POSITIVE AIRWAY PRESSURE: ICD-10-PCS

## 2021-04-03 PROCEDURE — XW13325 TRANSFUSION OF CONVALESCENT PLASMA (NONAUTOLOGOUS) INTO PERIPHERAL VEIN, PERCUTANEOUS APPROACH, NEW TECHNOLOGY GROUP 5: ICD-10-PCS

## 2021-04-03 RX ADMIN — METHYLPREDNISOLONE SODIUM SUCCINATE SCH MG: 125 INJECTION, POWDER, FOR SOLUTION INTRAMUSCULAR; INTRAVENOUS at 18:41

## 2021-04-03 RX ADMIN — TAMSULOSIN HYDROCHLORIDE SCH MG: 0.4 CAPSULE ORAL at 08:21

## 2021-04-03 RX ADMIN — ENOXAPARIN SODIUM SCH MG: 40 INJECTION SUBCUTANEOUS at 08:21

## 2021-04-03 RX ADMIN — METHYLPREDNISOLONE SODIUM SUCCINATE SCH MG: 125 INJECTION, POWDER, FOR SOLUTION INTRAMUSCULAR; INTRAVENOUS at 23:35

## 2021-04-03 RX ADMIN — ACETAMINOPHEN PRN MG: 325 TABLET, FILM COATED ORAL at 02:55

## 2021-04-03 RX ADMIN — POTASSIUM CHLORIDE SCH MLS/HR: 14.9 INJECTION, SOLUTION INTRAVENOUS at 07:09

## 2021-04-03 RX ADMIN — PANTOPRAZOLE SODIUM SCH MG: 40 INJECTION, POWDER, FOR SOLUTION INTRAVENOUS at 08:21

## 2021-04-03 RX ADMIN — Medication SCH MCG: at 08:21

## 2021-04-03 RX ADMIN — ALBUTEROL SULFATE PRN PUFF: 90 AEROSOL, METERED RESPIRATORY (INHALATION) at 12:14

## 2021-04-03 RX ADMIN — DEXTROSE SCH MG: 50 INJECTION, SOLUTION INTRAVENOUS at 08:21

## 2021-04-03 RX ADMIN — OXYCODONE HYDROCHLORIDE AND ACETAMINOPHEN SCH MG: 500 TABLET ORAL at 08:21

## 2021-04-03 RX ADMIN — ALBUTEROL SULFATE PRN PUFF: 90 AEROSOL, METERED RESPIRATORY (INHALATION) at 08:28

## 2021-04-03 RX ADMIN — Medication SCH MG: at 08:21

## 2021-04-03 NOTE — P.PN
Subjective


Progress Note Date: 04/03/21


Principal diagnosis: 





CoVID 19 pneumonia








50-year-old male, with history of shortness of breath, who presents to the 

emergency department on March 31, a little after 9:00 PM.  He apparently was 

brought in by EMS.  I saw the patient in the emergency room, in room 33.  He was

on O2 several liters by nasal cannula and not receiving any IV fluids.  He has b

een sick for about 11 or 12 days.  He apparently just tested positive today.  

His symptoms included shortness of breath, weakness, fatigue, low saturations, 

and fever.  As an outpatient, he was on Tylenol, a Z-Alex, Claritin, Zofran, 

Flomax, and a Medrol Dosepak.  He does have ALLERGIES to penicillin antibiotics,

and sulfa antibiotics.  The patient was quite fatigued when I saw him in the 

emergency room.  He could barely open his eyes.  He was not demonstrating any 

signs or symptoms of respiratory compromise, and did not have any conversational

dyspnea or accessory muscle use.  His white count was 4.2, hemoglobin 16, 

hematocrit 46.2, and platelet count 201,000.  PT/INR PTT were normal.  Sodium 

136, potassium 4.4, chlorides 104, CO2 24, anion gap 8, BUN 17, and creatinine 

0.94.  AST was 96, FiO2 104, LDH was 723, and C-reactive protein was 133.9.  

There was no d-dimer ordered.  A chest x-ray did reveal bilateral infiltrates.








04/03/2021 


Patient is seen and evaluated in follow-up on the regular medical floor.  He is 

currently resting on his right side in bed.  Still quite short of breath with 

minimal exertion.  Still requiring AirVo high flow oxygen at 60 L and 90% FiO2 

to maintain O2 saturations in the 90s.  Chest x-ray continues to show persistent

interstitial opacities improving and the left but now worsening on the right mid

and lower lungs.  D-dimer 0.91.  .  C-reactive protein 2O2.  He remains 

on dexamethasone, Lovenox, vitamin supplements.


Pulmonary on board and recommending  tocilizumab and convalescent plasma; 

dexamethasone is discontinued and patient is started on Solu-Medrol 60 mg IV 

every 6 hours; continue with Lovenox and vitamin supplements; continue to follow

inflammatory markers and chest x-ray; further recommendations pending clinical 

course





Objective





- Vital Signs


Vital signs: 


                                   Vital Signs











Temp  98.9 F   04/03/21 10:24


 


Pulse  91   04/03/21 10:24


 


Resp  40 H  04/03/21 10:24


 


BP  103/62   04/03/21 10:24


 


Pulse Ox  91 L  04/03/21 10:24








                                 Intake & Output











 04/02/21 04/03/21 04/03/21





 18:59 06:59 18:59


 


Intake Total  150 


 


Balance  150 


 


Weight 112.491 kg  


 


Intake:   


 


  Oral  150 


 


Other:   


 


  Voiding Method   Urinal


 


  # Voids 2 1 














- Exam


GENERAL EXAM: Alert, pleasant 50-year-old gentleman, on AirVo high flow oxygen 

at 60 L and 90% FiO2 comfortable in no apparent distress.


HEAD: Normocephalic.


NECK: No masses, no JVD.


CHEST: No chest wall deformity.


LUNGS: Equal air entry with basilar crackles, right greater than left.


CVS: S1 and S2 normal with no audible murmur, regular rhythm.


ABDOMEN: No hepatosplenomegaly, normal bowel sounds, no guarding or rigidity.


CENTRAL NERVOUS SYSTEM: No focal deficits, tone is normal in all 4 extremities.


EXTREMITIES: There is no peripheral edema.  No clubbing, no cyanosis.  

Peripheral pulses are intact.








- Labs


CBC & Chem 7: 


                                 04/01/21 08:06





                                 04/01/21 08:06


Labs: 


                  Abnormal Lab Results - Last 24 Hours (Table)











  04/03/21 04/03/21 Range/Units





  10:39 10:39 


 


D-Dimer  0.91 H   (<0.60)  mg/L FEU


 


Lactate Dehydrogenase   964 H  (313-618)  U/L


 


C-Reactive Protein   202.7 H  (<10.0)  mg/L








                      Microbiology - Last 24 Hours (Table)











 03/31/21 22:53 Blood Culture - Preliminary





 Blood    No Growth after 48 hours


 


 03/31/21 22:38 Blood Culture - Preliminary





 Blood    No Growth after 48 hours














Assessment and Plan


Assessment: 





1.  Acute hypoxic respiratory failure secondary to CoVID 19 pneumonia.  Outside 

the window for Remdesivir





2.  History of chronic tobacco dependence





3.  Seasonal ALLERGIES; Claritin 10 mg daily





4.  BPH; continue with Flomax 0.4 mg daily


Plan: 


The patient was seen and evaluated by Dr. Gamino.


Chest x-ray and labs reviewed


Worsening infiltrates more so on the right


Remains on airflow high flow oxygen at 60 L and 90% FiO2


We'll order tocilizumab, convalescent plasma


Discontinue dexamethasone, add IV Solu-Medrol 60 mg every 6 hours


Continue Lovenox, vitamin supplements


Add incentive spirometer


Encouraged frequent position changes


Follow-up chest x-ray, inflammatory markers in the a.m.


We will continue to follow

## 2021-04-03 NOTE — P.PN
Subjective


Progress Note Date: 04/03/21


Principal diagnosis: 





CoVID 19 pneumonia





50-year-old male, with history of shortness of breath, who presents to the 

emergency department on March 31, a little after 9:00 PM.  He apparently was 

brought in by EMS.  I saw the patient in the emergency room, in room 33.  He was

on O2 several liters by nasal cannula and not receiving any IV fluids.  He has 

been sick for about 11 or 12 days.  He apparently just tested positive today.  

His symptoms included shortness of breath, weakness, fatigue, low saturations, 

and fever.  As an outpatient, he was on Tylenol, a Z-Alex, Claritin, Zofran, 

Flomax, and a Medrol Dosepak.  He does have ALLERGIES to penicillin antibiotics,

and sulfa antibiotics.  The patient was quite fatigued when I saw him in the 

emergency room.  He could barely open his eyes.  He was not demonstrating any 

signs or symptoms of respiratory compromise, and did not have any conversational

dyspnea or accessory muscle use.  His white count was 4.2, hemoglobin 16, he

matocrit 46.2, and platelet count 201,000.  PT/INR PTT were normal.  Sodium 136,

potassium 4.4, chlorides 104, CO2 24, anion gap 8, BUN 17, and creatinine 0.94. 

AST was 96, FiO2 104, LDH was 723, and C-reactive protein was 133.9.  There was 

no d-dimer ordered.  A chest x-ray did reveal bilateral infiltrates.





The patient is seen today 04/02/2021 in follow-up on the regular medical floor. 

He is currently awake, alert, in no acute distress.  He is still requiring AirVo

high flow nasal cannula at 60 L and 90% FiO2 to maintain O2 saturations in the 

low 90s.  He is currently afebrile.  Hemodynamically stable.  Blood cultures 

reveal no growth.  He remains on Lovenox, Decadron, vitamin supplements. 





The patient is seen today 04/03/2021 in follow-up on the regular medical floor. 

He is currently resting on his right side in bed.  Still quite short of breath 

with minimal exertion.  Still requiring AirVo high flow oxygen at 60 L and 90% 

FiO2 to maintain O2 saturations in the 90s.  Chest x-ray continues to show 

persistent interstitial opacities improving and the left but now worsening on 

the right mid and lower lungs.  D-dimer 0.91.  .  C-reactive protein 2O2.

 He remains on dexamethasone, Lovenox, vitamin supplements.





Objective





- Vital Signs


Vital signs: 


                                   Vital Signs











Temp  98.6 F   04/03/21 14:00


 


Pulse  69   04/03/21 14:00


 


Resp  32 H  04/03/21 14:00


 


BP  122/73   04/03/21 14:00


 


Pulse Ox  90 L  04/03/21 14:00








                                 Intake & Output











 04/02/21 04/03/21 04/03/21





 18:59 06:59 18:59


 


Intake Total  150 0


 


Balance  150 0


 


Weight 112.491 kg  


 


Intake:   


 


  Oral  150 


 


  Blood Product   0


 


    Ffp Convalescent Plasma   0





    Cpd  Unit [id number]   


 


Other:   


 


  Voiding Method   Urinal


 


  # Voids 2 1 














- Exam





GENERAL EXAM: Alert, pleasant 50-year-old gentleman, on AirVo high flow oxygen 

at 60 L and 90% FiO2 comfortable in no apparent distress.


HEAD: Normocephalic.


EYES: Normal reaction of pupils, equal size.


NOSE: Clear with pink turbinates.


THROAT: No erythema or exudates.


NECK: No masses, no JVD.


CHEST: No chest wall deformity.


LUNGS: Equal air entry with basilar crackles, right greater than left.


CVS: S1 and S2 normal with no audible murmur, regular rhythm.


ABDOMEN: No hepatosplenomegaly, normal bowel sounds, no guarding or rigidity.


SPINE: No scoliosis or deformity


SKIN: No rashes


CENTRAL NERVOUS SYSTEM: No focal deficits, tone is normal in all 4 extremities.


EXTREMITIES: There is no peripheral edema.  No clubbing, no cyanosis.  

Peripheral pulses are intact.





- Labs


CBC & Chem 7: 


                                 04/01/21 08:06





                                 04/01/21 08:06


Labs: 


                  Abnormal Lab Results - Last 24 Hours (Table)











  04/03/21 04/03/21 Range/Units





  10:39 10:39 


 


D-Dimer  0.91 H   (<0.60)  mg/L FEU


 


Lactate Dehydrogenase   964 H  (313-618)  U/L


 


C-Reactive Protein   202.7 H  (<10.0)  mg/L








                      Microbiology - Last 24 Hours (Table)











 03/31/21 22:53 Blood Culture - Preliminary





 Blood    No Growth after 48 hours


 


 03/31/21 22:38 Blood Culture - Preliminary





 Blood    No Growth after 48 hours














Assessment and Plan


Assessment: 





1 Acute hypoxic respiratory failure secondary to CoVID 19 pneumonia.  Outside 

the window for Remdesivir





2 History of chronic tobacco dependence





Plan: 





The patient was seen and evaluated by Dr. Gamino.


Chest x-ray and labs reviewed


Worsening infiltrates more so on the right


Remains on airflow high flow oxygen at 60 L and 90% FiO2


We'll order tocilizumab, convalescent plasma


Discontinue dexamethasone, add IV Solu-Medrol 60 mg every 6 hours


Continue Lovenox, vitamin supplements


Add incentive spirometer


Encouraged frequent position changes


Follow-up chest x-ray, inflammatory markers in the a.m.


We will continue to follow





I, the cosigning physician, performed a history & physical examination of the 

patient. Lungs sounds with crackles in the bilateral posterior bases, right 

greater than left.  Maintaining good O2 saturations in the 90s on AirVo high 

flow oxygen at 60 L and 90% FiO2.  I discussed the assessment and plan of care 

with my nurse practitioner, Africa Boudreaux. I attest to the above note as dictated 

by her.

## 2021-04-03 NOTE — XR
EXAMINATION TYPE: XR chest 1V portable

 

DATE OF EXAM: 4/3/2021

 

Comparison: 3/31/2021

 

Clinical History: 50-year-old male shortness of breath

 

Findings:

Heart borderline in size. Persistent interstitial opacities. Improving opacity at the left base but w
orsening opacity right mid and lower lung.

 

 

Impression:

Shifting airspace disease now greater on the right than the left. Findings may reflect patchy pulmona
ry edema. Multifocal pneumonia is also possible but uncommonly show shifting opacities.

## 2021-04-04 LAB
ALBUMIN SERPL-MCNC: 3.3 G/DL (ref 3.5–5)
ALBUMIN/GLOB SERPL: 0.9 {RATIO}
ALP SERPL-CCNC: 75 U/L (ref 38–126)
ALT SERPL-CCNC: 74 U/L (ref 4–49)
ANION GAP SERPL CALC-SCNC: 6 MMOL/L
APTT BLD: 22.4 SEC (ref 22–30)
AST SERPL-CCNC: 57 U/L (ref 17–59)
BASOPHILS # BLD AUTO: 0 K/UL (ref 0–0.2)
BASOPHILS NFR BLD AUTO: 0 %
BUN SERPL-SCNC: 33 MG/DL (ref 9–20)
CALCIUM SPEC-MCNC: 8.9 MG/DL (ref 8.4–10.2)
CHLORIDE SERPL-SCNC: 108 MMOL/L (ref 98–107)
CK SERPL-CCNC: 166 U/L (ref 55–170)
CO2 SERPL-SCNC: 26 MMOL/L (ref 22–30)
D DIMER PPP FEU-MCNC: 1.27 MG/L FEU (ref ?–0.6)
EOSINOPHIL # BLD AUTO: 0 K/UL (ref 0–0.7)
EOSINOPHIL NFR BLD AUTO: 0 %
ERYTHROCYTE [DISTWIDTH] IN BLOOD BY AUTOMATED COUNT: 5.3 M/UL (ref 4.3–5.9)
ERYTHROCYTE [DISTWIDTH] IN BLOOD: 12.7 % (ref 11.5–15.5)
FERRITIN SERPL-MCNC: 1655.8 NG/ML (ref 22–322)
FIBRINOGEN PPP-MCNC: 794 MG/DL (ref 200–500)
GLOBULIN SER CALC-MCNC: 3.5 G/DL
GLUCOSE SERPL-MCNC: 149 MG/DL (ref 74–99)
HCT VFR BLD AUTO: 45.7 % (ref 39–53)
HGB BLD-MCNC: 15.5 GM/DL (ref 13–17.5)
INR PPP: 0.9 (ref ?–1.2)
LDH SPEC-CCNC: 1025 U/L (ref 313–618)
LYMPHOCYTES # SPEC AUTO: 0.4 K/UL (ref 1–4.8)
LYMPHOCYTES NFR SPEC AUTO: 4 %
MCH RBC QN AUTO: 29.3 PG (ref 25–35)
MCHC RBC AUTO-ENTMCNC: 34 G/DL (ref 31–37)
MCV RBC AUTO: 86.1 FL (ref 80–100)
MONOCYTES # BLD AUTO: 0.3 K/UL (ref 0–1)
MONOCYTES NFR BLD AUTO: 2 %
NEUTROPHILS # BLD AUTO: 9.3 K/UL (ref 1.3–7.7)
NEUTROPHILS NFR BLD AUTO: 92 %
PLATELET # BLD AUTO: 295 K/UL (ref 150–450)
POTASSIUM SERPL-SCNC: 4.6 MMOL/L (ref 3.5–5.1)
PROT SERPL-MCNC: 6.8 G/DL (ref 6.3–8.2)
PT BLD: 10.1 SEC (ref 9–12)
SODIUM SERPL-SCNC: 140 MMOL/L (ref 137–145)
WBC # BLD AUTO: 10.1 K/UL (ref 3.8–10.6)

## 2021-04-04 RX ADMIN — PANTOPRAZOLE SODIUM SCH MG: 40 INJECTION, POWDER, FOR SOLUTION INTRAVENOUS at 08:35

## 2021-04-04 RX ADMIN — ALBUTEROL SULFATE PRN PUFF: 90 AEROSOL, METERED RESPIRATORY (INHALATION) at 20:56

## 2021-04-04 RX ADMIN — TAMSULOSIN HYDROCHLORIDE SCH: 0.4 CAPSULE ORAL at 19:23

## 2021-04-04 RX ADMIN — OXYCODONE HYDROCHLORIDE AND ACETAMINOPHEN SCH MG: 500 TABLET ORAL at 08:36

## 2021-04-04 RX ADMIN — CEFAZOLIN SCH MLS/HR: 330 INJECTION, POWDER, FOR SOLUTION INTRAMUSCULAR; INTRAVENOUS at 22:52

## 2021-04-04 RX ADMIN — TAMSULOSIN HYDROCHLORIDE SCH MG: 0.4 CAPSULE ORAL at 08:35

## 2021-04-04 RX ADMIN — ENOXAPARIN SODIUM SCH MG: 40 INJECTION SUBCUTANEOUS at 08:35

## 2021-04-04 RX ADMIN — POTASSIUM CHLORIDE SCH: 14.9 INJECTION, SOLUTION INTRAVENOUS at 08:29

## 2021-04-04 RX ADMIN — Medication SCH MG: at 08:35

## 2021-04-04 RX ADMIN — ONDANSETRON PRN MG: 2 INJECTION INTRAMUSCULAR; INTRAVENOUS at 12:50

## 2021-04-04 RX ADMIN — METHYLPREDNISOLONE SODIUM SUCCINATE SCH MG: 125 INJECTION, POWDER, FOR SOLUTION INTRAMUSCULAR; INTRAVENOUS at 23:03

## 2021-04-04 RX ADMIN — CEFAZOLIN SCH MLS/HR: 330 INJECTION, POWDER, FOR SOLUTION INTRAMUSCULAR; INTRAVENOUS at 08:34

## 2021-04-04 RX ADMIN — METHYLPREDNISOLONE SODIUM SUCCINATE SCH MG: 125 INJECTION, POWDER, FOR SOLUTION INTRAMUSCULAR; INTRAVENOUS at 05:59

## 2021-04-04 RX ADMIN — METHYLPREDNISOLONE SODIUM SUCCINATE SCH MG: 125 INJECTION, POWDER, FOR SOLUTION INTRAMUSCULAR; INTRAVENOUS at 18:01

## 2021-04-04 RX ADMIN — METHYLPREDNISOLONE SODIUM SUCCINATE SCH MG: 125 INJECTION, POWDER, FOR SOLUTION INTRAMUSCULAR; INTRAVENOUS at 12:40

## 2021-04-04 RX ADMIN — ALBUTEROL SULFATE PRN PUFF: 90 AEROSOL, METERED RESPIRATORY (INHALATION) at 16:35

## 2021-04-04 NOTE — P.PN
Subjective


Progress Note Date: 04/04/21


Principal diagnosis: 





CoVID 19 pneumonia





50-year-old male, with history of shortness of breath, who presents to the 

emergency department on March 31, a little after 9:00 PM.  He apparently was 

brought in by EMS.  I saw the patient in the emergency room, in room 33.  He was

on O2 several liters by nasal cannula and not receiving any IV fluids.  He has 

been sick for about 11 or 12 days.  He apparently just tested positive today.  

His symptoms included shortness of breath, weakness, fatigue, low saturations, 

and fever.  As an outpatient, he was on Tylenol, a Z-Alex, Claritin, Zofran, 

Flomax, and a Medrol Dosepak.  He does have ALLERGIES to penicillin antibiotics,

and sulfa antibiotics.  The patient was quite fatigued when I saw him in the 

emergency room.  He could barely open his eyes.  He was not demonstrating any 

signs or symptoms of respiratory compromise, and did not have any conversational

dyspnea or accessory muscle use.  His white count was 4.2, hemoglobin 16, he

matocrit 46.2, and platelet count 201,000.  PT/INR PTT were normal.  Sodium 136,

potassium 4.4, chlorides 104, CO2 24, anion gap 8, BUN 17, and creatinine 0.94. 

AST was 96, FiO2 104, LDH was 723, and C-reactive protein was 133.9.  There was 

no d-dimer ordered.  A chest x-ray did reveal bilateral infiltrates.





The patient is seen today 04/02/2021 in follow-up on the regular medical floor. 

He is currently awake, alert, in no acute distress.  He is still requiring AirVo

high flow nasal cannula at 60 L and 90% FiO2 to maintain O2 saturations in the 

low 90s.  He is currently afebrile.  Hemodynamically stable.  Blood cultures 

reveal no growth.  He remains on Lovenox, Decadron, vitamin supplements. 





The patient is seen today 04/03/2021 in follow-up on the regular medical floor. 

He is currently resting on his right side in bed.  Still quite short of breath 

with minimal exertion.  Still requiring AirVo high flow oxygen at 60 L and 90% 

FiO2 to maintain O2 saturations in the 90s.  Chest x-ray continues to show 

persistent interstitial opacities improving and the left but now worsening on 

the right mid and lower lungs.  D-dimer 0.91.  .  C-reactive protein 2O2.

 He remains on dexamethasone, Lovenox, vitamin supplements.





The patient is seen today 04/04/2021 in follow-up in the intensive care unit.  

He was transferred here last evening after developing increasing shortness of 

breath and increasing oxygen requirements.  He is currently resting fairly 

comfortably in bed.  He remains on BiPAP 14/7 and 100% FiO2 to maintain O2 

saturations in the low 90s.  Chest x-ray continues to show persistent right 

greater than left diffuse interstitial opacities with interval decrease in the 

right lung base and mild increase on the left.  He did receive Tocilizumab and 

convalescent plasma yesterday.  He remains on Lovenox, IV Solu-Medrol, vitamin 

supplements.  White count 10.1.  Hemoglobin 15.5.  Lymphocytes 0.4.  D-dimer 

1.27.  Sodium 140.  Potassium 4.6.  Creatinine 0.82.  LDH 10,025.  C-reactive 

protein 171.





Objective





- Vital Signs


Vital signs: 


                                   Vital Signs











Temp  99.2 F   04/04/21 04:00


 


Pulse  61   04/04/21 06:00


 


Resp  33 H  04/04/21 06:00


 


BP  137/82   04/04/21 06:00


 


Pulse Ox  91 L  04/04/21 06:00








                                 Intake & Output











 04/03/21 04/04/21 04/04/21





 18:59 06:59 18:59


 


Intake Total 301 350 


 


Balance 301 350 


 


Weight  105.8 kg 


 


Intake:   


 


  IV  350 


 


    Dextrose 5%-0.45% NaCl 1,  350 





    000 ml @ 50 mls/hr IV .   





    Q20H [facility] Rx#:[id number]   


 


  Blood Product 301  


 


    Ffp Convalescent Plasma 301  





    Cpd  Unit [id number]   


 


Other:   


 


  Voiding Method Urinal Urinal 


 


  # Voids 0 0 


 


  # Bowel Movements  1 














- Exam





GENERAL EXAM: Alert, pleasant 50-year-old gentleman, on BiPAP 14/7 and 100% FiO2

comfortable in no apparent distress.


HEAD: Normocephalic.


EYES: Normal reaction of pupils, equal size.


NOSE: Clear with pink turbinates.


THROAT: No erythema or exudates.


NECK: No masses, no JVD.


CHEST: No chest wall deformity.


LUNGS: Equal air entry with basilar crackles, right greater than left.


CVS: S1 and S2 normal with no audible murmur, regular rhythm.


ABDOMEN: No hepatosplenomegaly, normal bowel sounds, no guarding or rigidity.


SPINE: No scoliosis or deformity


SKIN: No rashes


CENTRAL NERVOUS SYSTEM: No focal deficits, tone is normal in all 4 extremities.


EXTREMITIES: There is no peripheral edema.  No clubbing, no cyanosis.  Perip

heral pulses are intact.





- Labs


CBC & Chem 7: 


                                 04/04/21 03:35





                                 04/04/21 03:35


Labs: 


                  Abnormal Lab Results - Last 24 Hours (Table)











  04/03/21 04/03/21 04/03/21 Range/Units





  22:19 22:35 22:35 


 


Neutrophils #   9.0 H   (1.3-7.7)  k/uL


 


Lymphocytes #   0.3 L   (1.0-4.8)  k/uL


 


Fibrinogen     (200-500)  mg/dL


 


D-Dimer     (<0.60)  mg/L FEU


 


Chloride     ()  mmol/L


 


BUN    31 H  (9-20)  mg/dL


 


Glucose    141 H  (74-99)  mg/dL


 


POC Glucose (mg/dL)  131 H    (75-99)  mg/dL


 


Ferritin     (22.0-322.0)  ng/mL


 


AST    67 H  (17-59)  U/L


 


ALT    80 H  (4-49)  U/L


 


Lactate Dehydrogenase     (313-618)  U/L


 


C-Reactive Protein     (<10.0)  mg/L


 


Albumin     (3.5-5.0)  g/dL














  04/04/21 04/04/21 04/04/21 Range/Units





  03:35 03:35 03:35 


 


Neutrophils #    9.3 H  (1.3-7.7)  k/uL


 


Lymphocytes #    0.4 L  (1.0-4.8)  k/uL


 


Fibrinogen  794 H    (200-500)  mg/dL


 


D-Dimer  1.27 H    (<0.60)  mg/L FEU


 


Chloride   108 H   ()  mmol/L


 


BUN   33 H   (9-20)  mg/dL


 


Glucose   149 H   (74-99)  mg/dL


 


POC Glucose (mg/dL)     (75-99)  mg/dL


 


Ferritin   1655.8 H   (22.0-322.0)  ng/mL


 


AST     (17-59)  U/L


 


ALT   74 H   (4-49)  U/L


 


Lactate Dehydrogenase   1025 H   (313-618)  U/L


 


C-Reactive Protein   171.9 H   (<10.0)  mg/L


 


Albumin   3.3 L   (3.5-5.0)  g/dL








                      Microbiology - Last 24 Hours (Table)











 03/31/21 22:38 Blood Culture - Preliminary





 Blood    No Growth after 72 hours


 


 03/31/21 22:53 Blood Culture - Preliminary





 Blood    No Growth after 72 hours














Assessment and Plan


Assessment: 





1 Acute hypoxic respiratory failure secondary to CoVID 19 pneumonia.  Outside 

the window for Remdesivir.  Did receive tocilizumab and 1 unit of convalescent 

plasma





2 History of chronic tobacco dependence





Plan: 





The patient was seen and evaluated by Dr. Gamino.


Chest x-ray and labs reviewed


Transferred to the intensive care unit last evening


Currently on BiPAP 14/7 and 100% FiO2


Did receive tocilizumab and convalescent plasma yesterday


Continue Lovenox, IV Solu-Medrol, vitamin supplements


Encouraged frequent position changes


Follow-up chest x-ray, inflammatory markers in the a.m.


We will continue to follow





I, the cosigning physician, performed a history & physical examination of the 

patient. Lungs sounds with crackles in the bilateral posterior bases, right 

greater than left.  Maintaining good O2 saturations in the 90s on BiPAP 14/7 and

100% FiO2.  I discussed the assessment and plan of care with my nurse 

practitioner, Africa Boudreaux. I attest to the above note as dictated by her.

## 2021-04-04 NOTE — P.PN
Subjective


Progress Note Date: 04/04/21


Principal diagnosis: 





CoVID 19 pneumonia








50-year-old male, with history of shortness of breath, who presents to the 

emergency department on March 31, a little after 9:00 PM.  He apparently was 

brought in by EMS.  I saw the patient in the emergency room, in room 33.  He was

on O2 several liters by nasal cannula and not receiving any IV fluids.  He has b

een sick for about 11 or 12 days.  He apparently just tested positive today.  

His symptoms included shortness of breath, weakness, fatigue, low saturations, 

and fever.  As an outpatient, he was on Tylenol, a Z-Alex, Claritin, Zofran, 

Flomax, and a Medrol Dosepak.  He does have ALLERGIES to penicillin antibiotics,

and sulfa antibiotics.  The patient was quite fatigued when I saw him in the 

emergency room.  He could barely open his eyes.  He was not demonstrating any 

signs or symptoms of respiratory compromise, and did not have any conversational

dyspnea or accessory muscle use.  His white count was 4.2, hemoglobin 16, 

hematocrit 46.2, and platelet count 201,000.  PT/INR PTT were normal.  Sodium 

136, potassium 4.4, chlorides 104, CO2 24, anion gap 8, BUN 17, and creatinine 

0.94.  AST was 96, FiO2 104, LDH was 723, and C-reactive protein was 133.9.  

There was no d-dimer ordered.  A chest x-ray did reveal bilateral infiltrates.








04/03/2021 


Patient is seen and evaluated in follow-up on the regular medical floor.  He is 

currently resting on his right side in bed.  Still quite short of breath with 

minimal exertion.  Still requiring AirVo high flow oxygen at 60 L and 90% FiO2 

to maintain O2 saturations in the 90s.  Chest x-ray continues to show persistent

interstitial opacities improving and the left but now worsening on the right mid

and lower lungs.  D-dimer 0.91.  .  C-reactive protein 2O2.  He remains 

on dexamethasone, Lovenox, vitamin supplements.


Pulmonary on board and recommending  tocilizumab and convalescent plasma; 

dexamethasone is discontinued and patient is started on Solu-Medrol 60 mg IV 

every 6 hours; continue with Lovenox and vitamin supplements; continue to follow

inflammatory markers and chest x-ray; further recommendations pending clinical 

course





04/04/2021 


Patient is seen and evaluated in follow-up in the intensive care unit; was 

transferred here last evening after developing increasing shortness of breath 

and increasing oxygen requirements.  He is currently resting fairly comfortably 

in bed.  He remains on BiPAP 14/7 and 100% FiO2 to maintain O2 saturations in 

the low 90s.  


Chest x-ray continues to show persistent right greater than left diffuse 

interstitial opacities with interval decrease in the right lung base and mild 

increase on the left.   White count 10.1.  Hemoglobin 15.5.  Lymphocytes 0.4.  

D-dimer 1.27.  Sodium 140.  Potassium 4.6.  Creatinine 0.82.  LDH 10,025.  C-

reactive protein 171.


Patient received Tocilizumab and convalescent plasma yesterday.  He remains on 

Lovenox, IV Solu-Medrol, vitamin supplements.  








Objective





- Vital Signs


Vital signs: 


                                   Vital Signs











Temp  99.2 F   04/04/21 04:00


 


Pulse  61   04/04/21 06:00


 


Resp  33 H  04/04/21 06:00


 


BP  137/82   04/04/21 06:00


 


Pulse Ox  91 L  04/04/21 06:00








                                 Intake & Output











 04/03/21 04/04/21 04/04/21





 18:59 06:59 18:59


 


Intake Total 301 350 


 


Balance 301 350 


 


Weight  105.8 kg 


 


Intake:   


 


  IV  350 


 


    Dextrose 5%-0.45% NaCl 1,  350 





    000 ml @ 50 mls/hr IV .   





    Q20H [facility] Rx#:[id number]   


 


  Blood Product 301  


 


    Ffp Convalescent Plasma 301  





    Cpd  Unit [id number]   


 


Other:   


 


  Voiding Method Urinal Urinal 


 


  # Voids 0 0 


 


  # Bowel Movements  1 














- Exam


GENERAL EXAM: Alert, pleasant 50-year-old gentleman, on AirVo high flow oxygen 

at 60 L and 90% FiO2 comfortable in no apparent distress.


HEAD: Normocephalic.


NECK: No masses, no JVD.


CHEST: No chest wall deformity.


LUNGS: Equal air entry with basilar crackles, right greater than left.


CVS: S1 and S2 normal with no audible murmur, regular rhythm.


ABDOMEN: No hepatosplenomegaly, normal bowel sounds, no guarding or rigidity.


CENTRAL NERVOUS SYSTEM: No focal deficits, tone is normal in all 4 extremities.


EXTREMITIES: There is no peripheral edema.  No clubbing, no cyanosis.  

Peripheral pulses are intact.








- Labs


CBC & Chem 7: 


                                 04/04/21 03:35





                                 04/04/21 03:35


Labs: 


                  Abnormal Lab Results - Last 24 Hours (Table)











  04/03/21 04/03/21 04/03/21 Range/Units





  10:39 10:39 22:19 


 


Neutrophils #     (1.3-7.7)  k/uL


 


Lymphocytes #     (1.0-4.8)  k/uL


 


Fibrinogen     (200-500)  mg/dL


 


D-Dimer  0.91 H    (<0.60)  mg/L FEU


 


Chloride     ()  mmol/L


 


BUN     (9-20)  mg/dL


 


Glucose     (74-99)  mg/dL


 


POC Glucose (mg/dL)    131 H  (75-99)  mg/dL


 


AST     (17-59)  U/L


 


ALT     (4-49)  U/L


 


Lactate Dehydrogenase   964 H   (313-618)  U/L


 


C-Reactive Protein   202.7 H   (<10.0)  mg/L


 


Albumin     (3.5-5.0)  g/dL














  04/03/21 04/03/21 04/04/21 Range/Units





  22:35 22:35 03:35 


 


Neutrophils #  9.0 H    (1.3-7.7)  k/uL


 


Lymphocytes #  0.3 L    (1.0-4.8)  k/uL


 


Fibrinogen    794 H  (200-500)  mg/dL


 


D-Dimer    1.27 H  (<0.60)  mg/L FEU


 


Chloride     ()  mmol/L


 


BUN   31 H   (9-20)  mg/dL


 


Glucose   141 H   (74-99)  mg/dL


 


POC Glucose (mg/dL)     (75-99)  mg/dL


 


AST   67 H   (17-59)  U/L


 


ALT   80 H   (4-49)  U/L


 


Lactate Dehydrogenase     (313-618)  U/L


 


C-Reactive Protein     (<10.0)  mg/L


 


Albumin     (3.5-5.0)  g/dL














  04/04/21 04/04/21 Range/Units





  03:35 03:35 


 


Neutrophils #   9.3 H  (1.3-7.7)  k/uL


 


Lymphocytes #   0.4 L  (1.0-4.8)  k/uL


 


Fibrinogen    (200-500)  mg/dL


 


D-Dimer    (<0.60)  mg/L FEU


 


Chloride  108 H   ()  mmol/L


 


BUN  33 H   (9-20)  mg/dL


 


Glucose  149 H   (74-99)  mg/dL


 


POC Glucose (mg/dL)    (75-99)  mg/dL


 


AST    (17-59)  U/L


 


ALT  74 H   (4-49)  U/L


 


Lactate Dehydrogenase  1025 H   (313-618)  U/L


 


C-Reactive Protein  171.9 H   (<10.0)  mg/L


 


Albumin  3.3 L   (3.5-5.0)  g/dL








                      Microbiology - Last 24 Hours (Table)











 03/31/21 22:38 Blood Culture - Preliminary





 Blood    No Growth after 72 hours


 


 03/31/21 22:53 Blood Culture - Preliminary





 Blood    No Growth after 72 hours














Assessment and Plan


Assessment: 





1.  Acute hypoxic respiratory failure secondary to CoVID 19 pneumonia.  Outside 

the window for Remdesivir





2.  History of chronic tobacco dependence





3.  Seasonal ALLERGIES; Claritin 10 mg daily





4.  BPH; continue with Flomax 0.4 mg daily


Plan: 


The patient was seen and evaluated by Dr. Gamino.


Chest x-ray and labs reviewed


Worsening infiltrates more so on the right


Remains on airflow high flow oxygen at 60 L and 90% FiO2


We'll order tocilizumab, convalescent plasma


Discontinue dexamethasone, add IV Solu-Medrol 60 mg every 6 hours


Continue Lovenox, vitamin supplements


Add incentive spirometer


Encouraged frequent position changes


Follow-up chest x-ray, inflammatory markers in the a.m.


We will continue to follow

## 2021-04-05 LAB
ALBUMIN SERPL-MCNC: 3.4 G/DL (ref 3.5–5)
ALP SERPL-CCNC: 75 U/L (ref 38–126)
ALT SERPL-CCNC: 63 U/L (ref 4–49)
ANION GAP SERPL CALC-SCNC: 8 MMOL/L
AST SERPL-CCNC: 47 U/L (ref 17–59)
BASOPHILS # BLD AUTO: 0 K/UL (ref 0–0.2)
BASOPHILS NFR BLD AUTO: 0 %
BUN SERPL-SCNC: 41 MG/DL (ref 9–20)
CALCIUM SPEC-MCNC: 8.9 MG/DL (ref 8.4–10.2)
CHLORIDE SERPL-SCNC: 109 MMOL/L (ref 98–107)
CO2 SERPL-SCNC: 26 MMOL/L (ref 22–30)
D DIMER PPP FEU-MCNC: 1.45 MG/L FEU (ref ?–0.6)
EOSINOPHIL # BLD AUTO: 0 K/UL (ref 0–0.7)
EOSINOPHIL NFR BLD AUTO: 0 %
ERYTHROCYTE [DISTWIDTH] IN BLOOD BY AUTOMATED COUNT: 5.29 M/UL (ref 4.3–5.9)
ERYTHROCYTE [DISTWIDTH] IN BLOOD: 12.8 % (ref 11.5–15.5)
FERRITIN SERPL-MCNC: 1511.4 NG/ML (ref 22–322)
FIBRINOGEN PPP-MCNC: 673 MG/DL (ref 200–500)
GLUCOSE SERPL-MCNC: 132 MG/DL (ref 74–99)
HCT VFR BLD AUTO: 46 % (ref 39–53)
HGB BLD-MCNC: 15.7 GM/DL (ref 13–17.5)
LYMPHOCYTES # SPEC AUTO: 0.5 K/UL (ref 1–4.8)
LYMPHOCYTES NFR SPEC AUTO: 3 %
MCH RBC QN AUTO: 29.7 PG (ref 25–35)
MCHC RBC AUTO-ENTMCNC: 34.1 G/DL (ref 31–37)
MCV RBC AUTO: 87 FL (ref 80–100)
MONOCYTES # BLD AUTO: 0.5 K/UL (ref 0–1)
MONOCYTES NFR BLD AUTO: 3 %
NEUTROPHILS # BLD AUTO: 13.5 K/UL (ref 1.3–7.7)
NEUTROPHILS NFR BLD AUTO: 92 %
PLATELET # BLD AUTO: 321 K/UL (ref 150–450)
POTASSIUM SERPL-SCNC: 4.3 MMOL/L (ref 3.5–5.1)
PROT SERPL-MCNC: 6.8 G/DL (ref 6.3–8.2)
SODIUM SERPL-SCNC: 143 MMOL/L (ref 137–145)
WBC # BLD AUTO: 14.7 K/UL (ref 3.8–10.6)

## 2021-04-05 RX ADMIN — ALBUTEROL SULFATE PRN PUFF: 90 AEROSOL, METERED RESPIRATORY (INHALATION) at 08:13

## 2021-04-05 RX ADMIN — Medication SCH MCG: at 07:57

## 2021-04-05 RX ADMIN — METHYLPREDNISOLONE SODIUM SUCCINATE SCH MG: 125 INJECTION, POWDER, FOR SOLUTION INTRAMUSCULAR; INTRAVENOUS at 18:18

## 2021-04-05 RX ADMIN — CEFAZOLIN SCH MLS/HR: 330 INJECTION, POWDER, FOR SOLUTION INTRAMUSCULAR; INTRAVENOUS at 10:14

## 2021-04-05 RX ADMIN — CEFAZOLIN SCH MLS/HR: 330 INJECTION, POWDER, FOR SOLUTION INTRAMUSCULAR; INTRAVENOUS at 23:12

## 2021-04-05 RX ADMIN — Medication SCH MG: at 07:57

## 2021-04-05 RX ADMIN — METHYLPREDNISOLONE SODIUM SUCCINATE SCH MG: 125 INJECTION, POWDER, FOR SOLUTION INTRAMUSCULAR; INTRAVENOUS at 12:48

## 2021-04-05 RX ADMIN — METHYLPREDNISOLONE SODIUM SUCCINATE SCH MG: 125 INJECTION, POWDER, FOR SOLUTION INTRAMUSCULAR; INTRAVENOUS at 07:05

## 2021-04-05 RX ADMIN — METHYLPREDNISOLONE SODIUM SUCCINATE SCH MG: 125 INJECTION, POWDER, FOR SOLUTION INTRAMUSCULAR; INTRAVENOUS at 23:12

## 2021-04-05 RX ADMIN — OXYCODONE HYDROCHLORIDE AND ACETAMINOPHEN SCH MG: 500 TABLET ORAL at 07:57

## 2021-04-05 RX ADMIN — ACETAMINOPHEN PRN MG: 325 TABLET, FILM COATED ORAL at 10:14

## 2021-04-05 RX ADMIN — ENOXAPARIN SODIUM SCH MG: 40 INJECTION SUBCUTANEOUS at 07:57

## 2021-04-05 RX ADMIN — TAMSULOSIN HYDROCHLORIDE SCH MG: 0.4 CAPSULE ORAL at 07:57

## 2021-04-05 RX ADMIN — PANTOPRAZOLE SODIUM SCH MG: 40 INJECTION, POWDER, FOR SOLUTION INTRAVENOUS at 07:57

## 2021-04-05 RX ADMIN — ONDANSETRON PRN MG: 2 INJECTION INTRAMUSCULAR; INTRAVENOUS at 13:05

## 2021-04-05 NOTE — P.PN
Subjective


Progress Note Date: 04/05/21


Malcom Griffith is a 49 yo M with PMH of allergies who presented to the ED via EMS

with worsening malaise and shortness of breath. He states he developed a cough 

and sore throat about 2 weeks ago which has worsened since then. He complains of

fever chills and shortness of breath. On arrival he was febrile tachypenic and 

hypoxic SpO2 94% on 4 L O2. WBC 5.8, lymphopenia, , , COVID 

positive, CXR with coarse bilateral infiltrates.





[id number] maintained on 90% airflow, O2 sats in the low 90s.  Positive 

nonproductive cough .  Outside window for Remdesevir, continue on steroids, 

vitamins,lovenox. Chest pain, palpitations, complains of chronic back pain.  T-

max 100.2.  Preliminary blood cultures reporting no growth at 24 hours





04/05/2021 status post convalescent plasma, TOCI, maintaining O2 sats in the 90s

on 100% BiPAP.  Chest x-ray reporting stable diffuse bilateral infiltrates.  

Afebrile, T-max 99.2, WBC 14.7.  Hemoglobin 15.7, platelets 321.  BUN 41, 

creatinine 0.94. D-dimer 1.45, ferritin 1511.4, LDH 1025, CRP 54.1.





Objective





- Vital Signs


Vital signs: 


                                   Vital Signs











Temp  98.1 F   04/05/21 12:00


 


Pulse  57 L  04/05/21 17:00


 


Resp  25 H  04/05/21 17:00


 


BP  123/75   04/05/21 17:00


 


Pulse Ox  92 L  04/05/21 17:00








                                 Intake & Output











 04/04/21 04/05/21 04/05/21





 18:59 06:59 18:59


 


Intake Total 800 900 825


 


Output Total 300 550 525


 


Balance 500 350 300


 


Weight  104.4 kg 104.4 kg


 


Intake:   


 


   900 825


 


    Dextrose 5%-0.45% NaCl 1, 50  





    000 ml @ 50 mls/hr IV .   





    Q20H [facility] Rx#:[id number]   


 


    Sodium Chloride 0.9% 1, 750 900 825





    000 ml @ 75 mls/hr IV .   





    [id number] [facility] Rx#:[id number]   


 


Output:   


 


  Urine 300 550 525


 


Other:   


 


  Voiding Method  Urinal 


 


  # Voids 1  


 


  # Bowel Movements 1 1 1














- Exam





General: Sitting up in bed, wearing bipap,increased respiratory effort 


Eyes: PERRL, EOMI, conjunctiva normal


HENT: normocephalic, mucus membranes moist


Neck: supple, no JVD


Lungs: Bilateral bases diminished, scattered rhonchi, bilateral rales


CV: Regular rate and rhythm, no murmur. Peripheral pulses 2+


Abdomen: soft, nondistended, no organomegy, positive bowel sounds


Skin: warm and dry.


Neuro: A&Ox3, normal mood and affect








- Labs


CBC & Chem 7: 


                                 04/05/21 04:12





                                 04/05/21 04:12


Labs: 


                  Abnormal Lab Results - Last 24 Hours (Table)











  04/05/21 04/05/21 04/05/21 Range/Units





  04:12 04:12 04:12 


 


WBC  14.7 H    (3.8-10.6)  k/uL


 


Neutrophils #  13.5 H    (1.3-7.7)  k/uL


 


Lymphocytes #  0.5 L    (1.0-4.8)  k/uL


 


Fibrinogen   673 H   (200-500)  mg/dL


 


D-Dimer   1.45 H   (<0.60)  mg/L FEU


 


Chloride    109 H  ()  mmol/L


 


BUN    41 H  (9-20)  mg/dL


 


Glucose    132 H  (74-99)  mg/dL


 


Ferritin    1511.4 H  (22.0-322.0)  ng/mL


 


ALT    63 H  (4-49)  U/L


 


C-Reactive Protein    54.1 H  (<10.0)  mg/L


 


Albumin    3.4 L  (3.5-5.0)  g/dL








                      Microbiology - Last 24 Hours (Table)











 03/31/21 22:53 Blood Culture - Preliminary





 Blood    No Growth after 96 hours


 


 03/31/21 22:38 Blood Culture - Preliminary





 Blood    No Growth after 96 hours














Assessment and Plan


Assessment: 


Severe sepsis secondary to Covid pneumonia, beyond window for Remdesevir





Acute hypoxic respiratory failure secondary to the above, airvo dependent








Gastroesophageal reflux disease





Chronic low back pain








Former nicotine dependence




















Plan: Continue on current medication regime ,monitoring and symptomatic 

treatment.  Maintain Covid regimen.  ICU management as per 

pulmonary/intensivist. Prognosis guarded.

















The impression and plan of care has been dictated as directed.





:


I performed a history and examination of this patient,  discussed the same with 

the dictator.  I agree with the dictator's note ,documented as a scribe.  Any 

additional findings or plans will be noted.

## 2021-04-05 NOTE — XR
EXAMINATION TYPE: XR chest 1V

 

DATE OF EXAM: 4/5/2021

 

COMPARISON: 4/4/2021

 

HISTORY: Shortness of breath

 

TECHNIQUE: Single frontal view of the chest is obtained.

 

FINDINGS:  Bilateral diffuse airspace disease with small effusion stable. Heart size unchanged. No si
zable pneumothorax.

 

IMPRESSION:  Diffuse bilateral infiltrate stable.

## 2021-04-05 NOTE — P.PN
Subjective


Progress Note Date: 04/05/21


Principal diagnosis: 





Acute hypoxemic respiratory failure.





50-year-old male, with history of shortness of breath, who presents to the 

emergency department on March 31, a little after 9:00 PM.  He apparently was 

brought in by EMS.  I saw the patient in the emergency room, in room 33.  He was

on O2 several liters by nasal cannula and not receiving any IV fluids.  He has 

been sick for about 11 or 12 days.  He apparently just tested positive today.  

His symptoms included shortness of breath, weakness, fatigue, low saturations, 

and fever.  As an outpatient, he was on Tylenol, a Z-Alex, Claritin, Zofran, 

Flomax, and a Medrol Dosepak.  He does have ALLERGIES to penicillin antibiotics,

and sulfa antibiotics.  The patient was quite fatigued when I saw him in the 

emergency room.  He could barely open his eyes.  He was not demonstrating any 

signs or symptoms of respiratory compromise, and did not have any conversational

dyspnea or accessory muscle use.  His white count was 4.2, hemoglobin 16, 

hematocrit 46.2, and platelet count 201,000.  PT/INR PTT were normal.  Sodium 

136, potassium 4.4, chlorides 104, CO2 24, anion gap 8, BUN 17, and creatinine 

0.94.  AST was 96, FiO2 104, LDH was 723, and C-reactive protein was 133.9.  

There was no d-dimer ordered.  A chest x-ray did reveal bilateral infiltrates.





The patient is seen today 04/02/2021 in follow-up on the regular medical floor. 

He is currently awake, alert, in no acute distress.  He is still requiring AirVo

high flow nasal cannula at 60 L and 90% FiO2 to maintain O2 saturations in the 

low 90s.  He is currently afebrile.  Hemodynamically stable.  Blood cultures 

reveal no growth.  He remains on Lovenox, Decadron, vitamin supplements. 





The patient is seen today 04/03/2021 in follow-up on the regular medical floor. 

He is currently resting on his right side in bed.  Still quite short of breath 

with minimal exertion.  Still requiring AirVo high flow oxygen at 60 L and 90% 

FiO2 to maintain O2 saturations in the 90s.  Chest x-ray continues to show 

persistent interstitial opacities improving and the left but now worsening on 

the right mid and lower lungs.  D-dimer 0.91.  .  C-reactive protein 2O2.

 He remains on dexamethasone, Lovenox, vitamin supplements.





The patient is seen today 04/04/2021 in follow-up in the intensive care unit.  

He was transferred here last evening after developing increasing shortness of 

breath and increasing oxygen requirements.  He is currently resting fairly 

comfortably in bed.  He remains on BiPAP 14/7 and 100% FiO2 to maintain O2 

saturations in the low 90s.  Chest x-ray continues to show persistent right 

greater than left diffuse interstitial opacities with interval decrease in the 

right lung base and mild increase on the left.  He did receive Tocilizumab and 

convalescent plasma yesterday.  He remains on Lovenox, IV Solu-Medrol, vitamin 

supplements.  White count 10.1.  Hemoglobin 15.5.  Lymphocytes 0.4.  D-dimer 

1.27.  Sodium 140.  Potassium 4.6.  Creatinine 0.82.  LDH 10,025.  C-reactive 

protein 171.





Progress note dated 04/05/2021.





The patient was admitted to the hospital on March 31.  He came to the ICU on 

April 3.  He is currently unfortunately on BiPAP at 14/7, and 100%.  He is 

getting saline at 75 mL an hour.  He is quite short of breath.  The patient did 

receive both convalescent plasma, and TOCI on April 3.  The patient doesn't feel

like he is getting better.  He doesn't feel any worse but just doesn't feel like

he is improving.  He does realize, that in the end, he may end up on the 

mechanical ventilator.  White count 14.7, hemoglobin 15.7, hematocrit 46.0, 

platelet count 321,000.  D-dimer is 1.45, and fibrinogen is 673.  Sodium 143, 

potassium 4.3, chlorides 109, CO2 26, anion gap 8, BUN 41, and creatinine 0.94. 

Most recent LDH is 1025.  The most recent C-reactive protein is 64.1.  Chest x-

ray continues to show bilateral diffuse interstitial infiltrates.





Objective





- Vital Signs


Vital signs: 


                                   Vital Signs











Temp  98.1 F   04/05/21 12:00


 


Pulse  51 L  04/05/21 13:00


 


Resp  35 H  04/05/21 13:00


 


BP  127/79   04/05/21 13:00


 


Pulse Ox  88 L  04/05/21 13:00








                                 Intake & Output











 04/04/21 04/05/21 04/05/21





 18:59 06:59 18:59


 


Intake Total 800 900 525


 


Output Total 300 550 0


 


Balance 500 350 525


 


Weight  104.4 kg 


 


Intake:   


 


   900 525


 


    Dextrose 5%-0.45% NaCl 1, 50  





    000 ml @ 50 mls/hr IV .   





    Q20H [facility] Rx#:[id number]   


 


    Sodium Chloride 0.9% 1, 750 900 525





    000 ml @ 75 mls/hr IV .   





    [id number] [facility] Rx#:[id number]   


 


Output:   


 


  Urine 300 550 0


 


Other:   


 


  Voiding Method  Urinal 


 


  # Voids 1  


 


  # Bowel Movements 1 1 1














- Exam





Mild conversational dyspnea.  Oriented 3, with BiPAP mask in place.





HEENT examination is grossly unremarkable.  





Neck supple.  Full range of motion.  No adenopathy thyromegaly or neck vein 

distention.





Cardiovascular examination reveals regular rhythm rate.  S1-S2 normal.  No S3 or

S4.  No discernible murmur noted.  Heart sounds are distant.





Lungs reveal bilateral diffuse rhonchi and crackles.  Breath sounds equal robert

aterally.  There are no wheezes.





Abdomen soft bowel sounds are heard.  No masses or tenderness.





Extremities are intact.  No cyanosis clubbing or edema.





Skin is without rash or lesion.





Neurologic examination is brief but nonfocal.





- Labs


CBC & Chem 7: 


                                 04/05/21 04:12





                                 04/05/21 04:12


Labs: 


                  Abnormal Lab Results - Last 24 Hours (Table)











  04/05/21 04/05/21 04/05/21 Range/Units





  04:12 04:12 04:12 


 


WBC  14.7 H    (3.8-10.6)  k/uL


 


Neutrophils #  13.5 H    (1.3-7.7)  k/uL


 


Lymphocytes #  0.5 L    (1.0-4.8)  k/uL


 


Fibrinogen   673 H   (200-500)  mg/dL


 


D-Dimer   1.45 H   (<0.60)  mg/L FEU


 


Chloride    109 H  ()  mmol/L


 


BUN    41 H  (9-20)  mg/dL


 


Glucose    132 H  (74-99)  mg/dL


 


Ferritin    1511.4 H  (22.0-322.0)  ng/mL


 


ALT    63 H  (4-49)  U/L


 


C-Reactive Protein    54.1 H  (<10.0)  mg/L


 


Albumin    3.4 L  (3.5-5.0)  g/dL








                      Microbiology - Last 24 Hours (Table)











 03/31/21 22:53 Blood Culture - Preliminary





 Blood    No Growth after 96 hours


 


 03/31/21 22:38 Blood Culture - Preliminary





 Blood    No Growth after 96 hours














Assessment and Plan


Assessment: 





Acute hypoxemic respiratory failure secondary to COVID 19 pneumonia.





Previous history of tobacco use, without evidence of chronic lung disease.





No other significant past medical history.


Plan: 





Plan dated 04/01/2021.





The patient is clearly beyond the window for REM.  The patient should receive 

vitamin C, vitamin D3, and zinc.  A D-dimer test should be ordered.  In 

addition, the patient should receive Decadron and Lovenox.  Additional 

recommendations suggestions are forthcoming.  Chest x-ray day or 2.  

Inflammatory markers should be measured every 2 or 3 days.  We will continue to 

follow make recommendations were appropriate.  Prognosis is guarded.





Plan dated 04/05/2021.





The patient was beyond the window or outside the window for REM.  The patient 

did receive convalescent plasma and TOCI.  Currently, the patient is on BiPAP 

with an IPAP of 14, and EPAP of 7.  The patient's on 100%.  The patient's 

getting saline at 75 mL an hour.  The patient realizes that he may end up on the

mechanical ventilator.  He was admitted on March 31, and moved to the intensive 

care unit for further monitoring and observation, on April 3.  Prognosis is 

guarded.  We will continue to follow make recommendations were appropriate.


Time with Patient: Greater than 30

## 2021-04-06 LAB
ANION GAP SERPL CALC-SCNC: 4 MMOL/L
BASOPHILS # BLD AUTO: 0 K/UL (ref 0–0.2)
BASOPHILS NFR BLD AUTO: 0 %
BUN SERPL-SCNC: 39 MG/DL (ref 9–20)
CALCIUM SPEC-MCNC: 8.6 MG/DL (ref 8.4–10.2)
CHLORIDE SERPL-SCNC: 112 MMOL/L (ref 98–107)
CO2 SERPL-SCNC: 26 MMOL/L (ref 22–30)
D DIMER PPP FEU-MCNC: 1.76 MG/L FEU (ref ?–0.6)
EOSINOPHIL # BLD AUTO: 0 K/UL (ref 0–0.7)
EOSINOPHIL NFR BLD AUTO: 0 %
ERYTHROCYTE [DISTWIDTH] IN BLOOD BY AUTOMATED COUNT: 5.06 M/UL (ref 4.3–5.9)
ERYTHROCYTE [DISTWIDTH] IN BLOOD: 12.8 % (ref 11.5–15.5)
FERRITIN SERPL-MCNC: 956.4 NG/ML (ref 22–322)
FIBRINOGEN PPP-MCNC: 557 MG/DL (ref 200–500)
GLUCOSE SERPL-MCNC: 132 MG/DL (ref 74–99)
HCT VFR BLD AUTO: 43.7 % (ref 39–53)
HGB BLD-MCNC: 14.7 GM/DL (ref 13–17.5)
LYMPHOCYTES # SPEC AUTO: 0.4 K/UL (ref 1–4.8)
LYMPHOCYTES NFR SPEC AUTO: 3 %
MCH RBC QN AUTO: 29.1 PG (ref 25–35)
MCHC RBC AUTO-ENTMCNC: 33.7 G/DL (ref 31–37)
MCV RBC AUTO: 86.4 FL (ref 80–100)
MONOCYTES # BLD AUTO: 0.6 K/UL (ref 0–1)
MONOCYTES NFR BLD AUTO: 4 %
NEUTROPHILS # BLD AUTO: 12.9 K/UL (ref 1.3–7.7)
NEUTROPHILS NFR BLD AUTO: 92 %
PLATELET # BLD AUTO: 328 K/UL (ref 150–450)
POTASSIUM SERPL-SCNC: 4.8 MMOL/L (ref 3.5–5.1)
SODIUM SERPL-SCNC: 142 MMOL/L (ref 137–145)
WBC # BLD AUTO: 14 K/UL (ref 3.8–10.6)

## 2021-04-06 RX ADMIN — Medication SCH MG: at 08:49

## 2021-04-06 RX ADMIN — METHYLPREDNISOLONE SODIUM SUCCINATE SCH MG: 125 INJECTION, POWDER, FOR SOLUTION INTRAMUSCULAR; INTRAVENOUS at 05:20

## 2021-04-06 RX ADMIN — CEFAZOLIN SCH MLS/HR: 330 INJECTION, POWDER, FOR SOLUTION INTRAMUSCULAR; INTRAVENOUS at 11:38

## 2021-04-06 RX ADMIN — METHYLPREDNISOLONE SODIUM SUCCINATE SCH MG: 125 INJECTION, POWDER, FOR SOLUTION INTRAMUSCULAR; INTRAVENOUS at 11:38

## 2021-04-06 RX ADMIN — METHYLPREDNISOLONE SODIUM SUCCINATE SCH MG: 125 INJECTION, POWDER, FOR SOLUTION INTRAMUSCULAR; INTRAVENOUS at 17:31

## 2021-04-06 RX ADMIN — ALBUTEROL SULFATE PRN PUFF: 90 AEROSOL, METERED RESPIRATORY (INHALATION) at 15:57

## 2021-04-06 RX ADMIN — OXYCODONE HYDROCHLORIDE AND ACETAMINOPHEN SCH MG: 500 TABLET ORAL at 08:49

## 2021-04-06 RX ADMIN — TAMSULOSIN HYDROCHLORIDE SCH MG: 0.4 CAPSULE ORAL at 08:48

## 2021-04-06 RX ADMIN — ONDANSETRON PRN MG: 2 INJECTION INTRAMUSCULAR; INTRAVENOUS at 11:38

## 2021-04-06 RX ADMIN — ALBUTEROL SULFATE PRN PUFF: 90 AEROSOL, METERED RESPIRATORY (INHALATION) at 19:49

## 2021-04-06 RX ADMIN — ENOXAPARIN SODIUM SCH MG: 40 INJECTION SUBCUTANEOUS at 08:48

## 2021-04-06 RX ADMIN — CEFAZOLIN SCH MLS/HR: 330 INJECTION, POWDER, FOR SOLUTION INTRAMUSCULAR; INTRAVENOUS at 21:10

## 2021-04-06 RX ADMIN — PANTOPRAZOLE SODIUM SCH MG: 40 INJECTION, POWDER, FOR SOLUTION INTRAVENOUS at 08:48

## 2021-04-06 RX ADMIN — Medication SCH MCG: at 08:48

## 2021-04-06 RX ADMIN — METHYLPREDNISOLONE SODIUM SUCCINATE SCH MG: 125 INJECTION, POWDER, FOR SOLUTION INTRAMUSCULAR; INTRAVENOUS at 23:05

## 2021-04-06 NOTE — P.PN
Subjective


Progress Note Date: 04/06/21


Principal diagnosis: 





Acute hypoxemic respiratory failure.





50-year-old male, with history of shortness of breath, who presents to the 

emergency department on March 31, a little after 9:00 PM.  He apparently was 

brought in by EMS.  I saw the patient in the emergency room, in room 33.  He was

on O2 several liters by nasal cannula and not receiving any IV fluids.  He has 

been sick for about 11 or 12 days.  He apparently just tested positive today.  

His symptoms included shortness of breath, weakness, fatigue, low saturations, 

and fever.  As an outpatient, he was on Tylenol, a Z-Alex, Claritin, Zofran, 

Flomax, and a Medrol Dosepak.  He does have ALLERGIES to penicillin antibiotics,

and sulfa antibiotics.  The patient was quite fatigued when I saw him in the 

emergency room.  He could barely open his eyes.  He was not demonstrating any 

signs or symptoms of respiratory compromise, and did not have any conversational

dyspnea or accessory muscle use.  His white count was 4.2, hemoglobin 16, 

hematocrit 46.2, and platelet count 201,000.  PT/INR PTT were normal.  Sodium 

136, potassium 4.4, chlorides 104, CO2 24, anion gap 8, BUN 17, and creatinine 

0.94.  AST was 96, FiO2 104, LDH was 723, and C-reactive protein was 133.9.  

There was no d-dimer ordered.  A chest x-ray did reveal bilateral infiltrates.





The patient is seen today 04/02/2021 in follow-up on the regular medical floor. 

He is currently awake, alert, in no acute distress.  He is still requiring AirVo

high flow nasal cannula at 60 L and 90% FiO2 to maintain O2 saturations in the 

low 90s.  He is currently afebrile.  Hemodynamically stable.  Blood cultures 

reveal no growth.  He remains on Lovenox, Decadron, vitamin supplements. 





The patient is seen today 04/03/2021 in follow-up on the regular medical floor. 

He is currently resting on his right side in bed.  Still quite short of breath 

with minimal exertion.  Still requiring AirVo high flow oxygen at 60 L and 90% 

FiO2 to maintain O2 saturations in the 90s.  Chest x-ray continues to show 

persistent interstitial opacities improving and the left but now worsening on 

the right mid and lower lungs.  D-dimer 0.91.  .  C-reactive protein 2O2.

 He remains on dexamethasone, Lovenox, vitamin supplements.





The patient is seen today 04/04/2021 in follow-up in the intensive care unit.  

He was transferred here last evening after developing increasing shortness of 

breath and increasing oxygen requirements.  He is currently resting fairly 

comfortably in bed.  He remains on BiPAP 14/7 and 100% FiO2 to maintain O2 

saturations in the low 90s.  Chest x-ray continues to show persistent right 

greater than left diffuse interstitial opacities with interval decrease in the 

right lung base and mild increase on the left.  He did receive Tocilizumab and 

convalescent plasma yesterday.  He remains on Lovenox, IV Solu-Medrol, vitamin 

supplements.  White count 10.1.  Hemoglobin 15.5.  Lymphocytes 0.4.  D-dimer 

1.27.  Sodium 140.  Potassium 4.6.  Creatinine 0.82.  LDH 10,025.  C-reactive 

protein 171.





Progress note dated 04/05/2021.





The patient was admitted to the hospital on March 31.  He came to the ICU on 

April 3.  He is currently unfortunately on BiPAP at 14/7, and 100%.  He is 

getting saline at 75 mL an hour.  He is quite short of breath.  The patient did 

receive both convalescent plasma, and TOCI on April 3.  The patient doesn't feel

like he is getting better.  He doesn't feel any worse but just doesn't feel like

he is improving.  He does realize, that in the end, he may end up on the 

mechanical ventilator.  White count 14.7, hemoglobin 15.7, hematocrit 46.0, 

platelet count 321,000.  D-dimer is 1.45, and fibrinogen is 673.  Sodium 143, 

potassium 4.3, chlorides 109, CO2 26, anion gap 8, BUN 41, and creatinine 0.94. 

Most recent LDH is 1025.  The most recent C-reactive protein is 64.1.  Chest x-

ray continues to show bilateral diffuse interstitial infiltrates.





Progress note dated 04/06/2021.





50-year-old male, admitted to the hospital on March 31.  He came in to the ICU 

on April 3.  Currently, the patient's on BiPAP with IPAP of 14, EPAP of 7.  FiO2

is 100%.  When he is not on BiPAP, the patient's on 15 L high flow nasal O2.  In

addition, when he is on the high flow nasal oxygen, he also has a nonrebreather 

mask on as well.  The patient's getting saline at 75 mL an hour.  White count 

14, hemoglobin 14.7, hematocrit 43.7, and platelet count 328,000.  D-dimer 1.76.

 Sodium 142, potassium 4.8, chlorides 112, CO2 26, anion gap 4, BUN and 

creatinine were 39 and 0.91.  C-reactive protein is down to 29.6.  Chest x-ray 

shows diffuse bilateral infiltrates.





Objective





- Vital Signs


Vital signs: 


                                   Vital Signs











Temp  98.9 F   04/06/21 08:00


 


Pulse  53 L  04/06/21 10:00


 


Resp  25 H  04/06/21 10:00


 


BP  121/86   04/06/21 10:00


 


Pulse Ox  89 L  04/06/21 10:00








                                 Intake & Output











 04/05/21 04/06/21 04/06/21





 18:59 06:59 18:59


 


Intake Total 900 835 465


 


Output Total 525 650 0


 


Balance 375 185 465


 


Weight 104.4 kg 106 kg 


 


Intake:   


 


   835 225


 


    Sodium Chloride 0.9% 1, 900 835 225





    000 ml @ 75 mls/hr IV .   





    [id number] [facility] Rx#:[id number]   


 


  Oral   240


 


Output:   


 


  Urine 525 650 0


 


Other:   


 


  Voiding Method  Urinal Urinal


 


  # Bowel Movements 1  














- Exam





Mild conversational dyspnea.  Oriented 3, with BiPAP mask in place.





HEENT examination is grossly unremarkable.  





Neck supple.  Full range of motion.  No adenopathy thyromegaly or neck vein 

distention.





Cardiovascular examination reveals regular rhythm rate.  S1-S2 normal.  No S3 or

S4.  No discernible murmur noted.  Heart sounds are distant.  Heart rate 53 bpm.





Lungs reveal bilateral diffuse rhonchi and crackles.  No wheezes.  Breath sounds

equal bilaterally.  





Abdomen soft bowel sounds are heard.  No masses or tenderness.





Extremities are intact.  No cyanosis clubbing or edema.





Skin is without rash or lesion.





Neurologic examination is brief but nonfocal.





- Labs


CBC & Chem 7: 


                                 04/06/21 03:34





                                 04/06/21 03:34


Labs: 


                  Abnormal Lab Results - Last 24 Hours (Table)











  04/05/21 04/06/21 04/06/21 Range/Units





  04:12 03:34 03:34 


 


WBC   14.0 H   (3.8-10.6)  k/uL


 


Neutrophils #   12.9 H   (1.3-7.7)  k/uL


 


Lymphocytes #   0.4 L   (1.0-4.8)  k/uL


 


Fibrinogen    557 H  (200-500)  mg/dL


 


D-Dimer    1.76 H  (<0.60)  mg/L FEU


 


Chloride     ()  mmol/L


 


BUN     (9-20)  mg/dL


 


Glucose     (74-99)  mg/dL


 


Ferritin  1511.4 H    (22.0-322.0)  ng/mL


 


C-Reactive Protein     (<10.0)  mg/L














  04/06/21 Range/Units





  03:34 


 


WBC   (3.8-10.6)  k/uL


 


Neutrophils #   (1.3-7.7)  k/uL


 


Lymphocytes #   (1.0-4.8)  k/uL


 


Fibrinogen   (200-500)  mg/dL


 


D-Dimer   (<0.60)  mg/L FEU


 


Chloride  112 H  ()  mmol/L


 


BUN  39 H  (9-20)  mg/dL


 


Glucose  132 H  (74-99)  mg/dL


 


Ferritin   (22.0-322.0)  ng/mL


 


C-Reactive Protein  29.6 H  (<10.0)  mg/L








                      Microbiology - Last 24 Hours (Table)











 03/31/21 22:53 Blood Culture - Preliminary





 Blood    No Growth after 120 hours


 


 03/31/21 22:38 Blood Culture - Preliminary





 Blood    No Growth after 120 hours














Assessment and Plan


Assessment: 





Acute hypoxemic respiratory failure secondary to COVID 19 pneumonia.





Previous history of tobacco use, without evidence of chronic lung disease.





No other significant past medical history.


Plan: 





Plan dated 04/01/2021.





The patient is clearly beyond the window for REM.  The patient should receive 

vitamin C, vitamin D3, and zinc.  A D-dimer test should be ordered.  In 

addition, the patient should receive Decadron and Lovenox.  Additional 

recommendations suggestions are forthcoming.  Chest x-ray day or 2.  

Inflammatory markers should be measured every 2 or 3 days.  We will continue to 

follow make recommendations were appropriate.  Prognosis is guarded.





Plan dated 04/05/2021.





The patient was beyond the window or outside the window for REM.  The patient 

did receive convalescent plasma and TOCI.  Currently, the patient is on BiPAP 

with an IPAP of 14, and EPAP of 7.  The patient's on 100%.  The patient's 

getting saline at 75 mL an hour.  The patient realizes that he may end up on the

mechanical ventilator.  He was admitted on March 31, and moved to the intensive 

care unit for further monitoring and observation, on April 3.  Prognosis is 

guarded.  We will continue to follow make recommendations were appropriate.





Plan dated 04/06/2021.





The patient remains on BiPAP at 14/7 and 100%.  At times, he's on 15 L high flow

nasal O2, in addition to a nonrebreather mask.  The patient is getting saline at

75 mL an hour.  His condition is certainly tenuous.  He really is not getting 

any better or getting any worse.  The patient was outside the window for REM.  

He did receive convalescent plasma and TOCI.  We will continue to follow.  Chest

x-ray is unchanged.  Labs are reviewed.  Prognosis is certainly guarded.  The 

patient may end up on mechanical ventilation.


Time with Patient: Greater than 30

## 2021-04-06 NOTE — P.PN
Subjective


Progress Note Date: 04/06/21


Malcom Griffith is a 51 yo M with PMH of allergies who presented to the ED via EMS

with worsening malaise and shortness of breath. He states he developed a cough 

and sore throat about 2 weeks ago which has worsened since then. He complains of

fever chills and shortness of breath. On arrival he was febrile tachypenic and 

hypoxic SpO2 94% on 4 L O2. WBC 5.8, lymphopenia, , , COVID 

positive, CXR with coarse bilateral infiltrates.





[id number] maintained on 90% airflow, O2 sats in the low 90s.  Positive 

nonproductive cough .  Outside window for Remdesevir, continue on steroids, 

vitamins,lovenox. Chest pain, palpitations, complains of chronic back pain.  T-

max 100.2.  Preliminary blood cultures reporting no growth at 24 hours





04/05/2021 status post convalescent plasma, TOCI, maintaining O2 sats in the 90s

on 100% BiPAP.  Chest x-ray reporting stable diffuse bilateral infiltrates.  

Afebrile, T-max 99.2, WBC 14.7.  Hemoglobin 15.7, platelets 321.  BUN 41, 

creatinine 0.94. D-dimer 1.45, ferritin 1511.4, LDH 1025, CRP 54.1.





04/06/2021 alternating between BiPAP and 15 L high flow nasal cannula combined 

with nonrebreather, maintaining O2 sats in the high 80s.  Chest x-ray reporting 

diffuse bilateral infiltrates stable. T-max 99,WBC 14.  Hemoglobin 14.7, 

platelets 328 D-dimer 1.76, ferritin and CRP decreased.  BUN 39, creatinine 

0.91.





Objective





- Vital Signs


Vital signs: 


                                   Vital Signs











Temp  98.4 F   04/06/21 12:00


 


Pulse  72   04/06/21 14:00


 


Resp  30 H  04/06/21 14:00


 


BP  128/75   04/06/21 14:00


 


Pulse Ox  88 L  04/06/21 14:00








                                 Intake & Output











 04/05/21 04/06/21 04/06/21





 18:59 06:59 18:59


 


Intake Total 900 835 960


 


Output Total 525 650 600


 


Balance 375 185 360


 


Weight 104.4 kg 106 kg 


 


Intake:   


 


   835 600


 


    Sodium Chloride 0.9% 1, 900 835 600





    000 ml @ 75 mls/hr IV .   





    [id number] [facility] Rx#:[id number]   


 


  Oral   360


 


Output:   


 


  Urine 525 650 600


 


Other:   


 


  Voiding Method  Urinal Urinal


 


  # Bowel Movements 1  1














- Exam





General: Sitting up in bed, wearing bipap,increased respiratory effort 


Eyes: PERRL, EOMI, conjunctiva normal


HENT: normocephalic, mucus membranes moist


Neck: supple, no JVD


Lungs: Bilateral bases diminished, scattered rhonchi, bilateral rales


CV: Regular rate and rhythm, no murmur. Peripheral pulses 2+


Abdomen: soft, nondistended, no organomegy, positive bowel sounds


Skin: warm and dry.


Neuro: A&Ox3, normal mood and affect








- Labs


CBC & Chem 7: 


                                 04/06/21 03:34





                                 04/06/21 03:34


Labs: 


                  Abnormal Lab Results - Last 24 Hours (Table)











  04/06/21 04/06/21 04/06/21 Range/Units





  03:34 03:34 03:34 


 


WBC  14.0 H    (3.8-10.6)  k/uL


 


Neutrophils #  12.9 H    (1.3-7.7)  k/uL


 


Lymphocytes #  0.4 L    (1.0-4.8)  k/uL


 


Fibrinogen   557 H   (200-500)  mg/dL


 


D-Dimer   1.76 H   (<0.60)  mg/L FEU


 


Chloride    112 H  ()  mmol/L


 


BUN    39 H  (9-20)  mg/dL


 


Glucose    132 H  (74-99)  mg/dL


 


Ferritin    956.4 H  (22.0-322.0)  ng/mL


 


C-Reactive Protein    29.6 H  (<10.0)  mg/L








                      Microbiology - Last 24 Hours (Table)











 03/31/21 22:53 Blood Culture - Preliminary





 Blood    No Growth after 120 hours


 


 03/31/21 22:38 Blood Culture - Preliminary





 Blood    No Growth after 120 hours














Assessment and Plan


Assessment: 


Severe sepsis secondary to Covid pneumonia, beyond window for Remdesevir





Acute hypoxic respiratory failure secondary to the above





Gastroesophageal reflux disease





Chronic low back pain





Former nicotine dependence




















Plan: Continue on current medication regime ,monitoring and symptomatic 

treatment. Maintain Covid regimen. ICU management as per pulmonary/intensivist. 

Prognosis guarded.

















The impression and plan of care has been dictated as directed.





:


I performed a history and examination of this patient,  discussed the same with 

the dictator.  I agree with the dictator's note ,documented as a scribe.  Any 

additional findings or plans will be noted.

## 2021-04-06 NOTE — XR
EXAMINATION TYPE: XR chest 1V portable

 

DATE OF EXAM: 4/6/2021

 

COMPARISON: 4/5/2021

 

HISTORY: Shortness of breath

 

TECHNIQUE: Single frontal view of the chest is obtained.

 

FINDINGS:  Bilateral diffuse airspace disease with small effusion stable. Heart size unchanged. No si
zable pneumothorax. Heart size stable. Osseous structures unchanged. Overlying artifact limits exam. 
Patient slightly rotated.

 

IMPRESSION: Diffuse bilateral infiltrate stable.

## 2021-04-07 LAB
ALBUMIN SERPL-MCNC: 3.1 G/DL (ref 3.5–5)
ALP SERPL-CCNC: 65 U/L (ref 38–126)
ALT SERPL-CCNC: 80 U/L (ref 4–49)
ANION GAP SERPL CALC-SCNC: 7 MMOL/L
AST SERPL-CCNC: 50 U/L (ref 17–59)
BASOPHILS # BLD AUTO: 0 K/UL (ref 0–0.2)
BASOPHILS NFR BLD AUTO: 0 %
BUN SERPL-SCNC: 33 MG/DL (ref 9–20)
CALCIUM SPEC-MCNC: 8.5 MG/DL (ref 8.4–10.2)
CHLORIDE SERPL-SCNC: 110 MMOL/L (ref 98–107)
CK SERPL-CCNC: 224 U/L (ref 55–170)
CO2 SERPL-SCNC: 25 MMOL/L (ref 22–30)
EOSINOPHIL # BLD AUTO: 0 K/UL (ref 0–0.7)
EOSINOPHIL NFR BLD AUTO: 0 %
ERYTHROCYTE [DISTWIDTH] IN BLOOD BY AUTOMATED COUNT: 5.07 M/UL (ref 4.3–5.9)
ERYTHROCYTE [DISTWIDTH] IN BLOOD: 12.5 % (ref 11.5–15.5)
FERRITIN SERPL-MCNC: 1106.7 NG/ML (ref 22–322)
GLUCOSE SERPL-MCNC: 141 MG/DL (ref 74–99)
HCT VFR BLD AUTO: 43.3 % (ref 39–53)
HGB BLD-MCNC: 14.8 GM/DL (ref 13–17.5)
LDH SPEC-CCNC: 1127 U/L (ref 313–618)
LYMPHOCYTES # SPEC AUTO: 0.4 K/UL (ref 1–4.8)
LYMPHOCYTES NFR SPEC AUTO: 3 %
MCH RBC QN AUTO: 29.1 PG (ref 25–35)
MCHC RBC AUTO-ENTMCNC: 34.1 G/DL (ref 31–37)
MCV RBC AUTO: 85.3 FL (ref 80–100)
MONOCYTES # BLD AUTO: 0.5 K/UL (ref 0–1)
MONOCYTES NFR BLD AUTO: 3 %
NEUTROPHILS # BLD AUTO: 12.6 K/UL (ref 1.3–7.7)
NEUTROPHILS NFR BLD AUTO: 93 %
PLATELET # BLD AUTO: 300 K/UL (ref 150–450)
POTASSIUM SERPL-SCNC: 4.2 MMOL/L (ref 3.5–5.1)
PROT SERPL-MCNC: 6.3 G/DL (ref 6.3–8.2)
SODIUM SERPL-SCNC: 142 MMOL/L (ref 137–145)
WBC # BLD AUTO: 13.6 K/UL (ref 3.8–10.6)

## 2021-04-07 RX ADMIN — METHYLPREDNISOLONE SODIUM SUCCINATE SCH MG: 125 INJECTION, POWDER, FOR SOLUTION INTRAMUSCULAR; INTRAVENOUS at 19:06

## 2021-04-07 RX ADMIN — METHYLPREDNISOLONE SODIUM SUCCINATE SCH MG: 125 INJECTION, POWDER, FOR SOLUTION INTRAMUSCULAR; INTRAVENOUS at 05:08

## 2021-04-07 RX ADMIN — METHYLPREDNISOLONE SODIUM SUCCINATE SCH MG: 125 INJECTION, POWDER, FOR SOLUTION INTRAMUSCULAR; INTRAVENOUS at 23:46

## 2021-04-07 RX ADMIN — PANTOPRAZOLE SODIUM SCH MG: 40 INJECTION, POWDER, FOR SOLUTION INTRAVENOUS at 09:38

## 2021-04-07 RX ADMIN — Medication SCH MG: at 09:39

## 2021-04-07 RX ADMIN — ENOXAPARIN SODIUM SCH MG: 40 INJECTION SUBCUTANEOUS at 09:38

## 2021-04-07 RX ADMIN — METHYLPREDNISOLONE SODIUM SUCCINATE SCH MG: 125 INJECTION, POWDER, FOR SOLUTION INTRAMUSCULAR; INTRAVENOUS at 12:28

## 2021-04-07 RX ADMIN — CEFAZOLIN SCH MLS/HR: 330 INJECTION, POWDER, FOR SOLUTION INTRAMUSCULAR; INTRAVENOUS at 12:28

## 2021-04-07 RX ADMIN — Medication SCH MCG: at 09:39

## 2021-04-07 RX ADMIN — CEFAZOLIN SCH MLS/HR: 330 INJECTION, POWDER, FOR SOLUTION INTRAMUSCULAR; INTRAVENOUS at 23:46

## 2021-04-07 RX ADMIN — TAMSULOSIN HYDROCHLORIDE SCH MG: 0.4 CAPSULE ORAL at 09:39

## 2021-04-07 RX ADMIN — OXYCODONE HYDROCHLORIDE AND ACETAMINOPHEN SCH MG: 500 TABLET ORAL at 09:39

## 2021-04-07 NOTE — XR
EXAMINATION TYPE: XR chest 1V portable

 

DATE OF EXAM: 4/7/2021

 

COMPARISON: 4/6/2021

 

HISTORY: Shortness of breath

 

TECHNIQUE: Single frontal view of the chest is obtained.

 

FINDINGS:  Diffuse mixed interstitial and alveolar infiltrates are stable. Heart size prominent and s
table. No pneumothorax. Biapical pleural thickening. Surgical clips in the upper abdomen.

 

IMPRESSION:  Stable diffuse bilateral infiltrate.

## 2021-04-07 NOTE — P.PN
Subjective


Progress Note Date: 04/07/21


Principal diagnosis: 





CoVID 19 pneumonia





50-year-old male, with history of shortness of breath, who presents to the 

emergency department on March 31, a little after 9:00 PM.  He apparently was 

brought in by EMS.  I saw the patient in the emergency room, in room 33.  He was

on O2 several liters by nasal cannula and not receiving any IV fluids.  He has 

been sick for about 11 or 12 days.  He apparently just tested positive today.  

His symptoms included shortness of breath, weakness, fatigue, low saturations, 

and fever.  As an outpatient, he was on Tylenol, a Z-Alex, Claritin, Zofran, 

Flomax, and a Medrol Dosepak.  He does have ALLERGIES to penicillin antibiotics,

and sulfa antibiotics.  The patient was quite fatigued when I saw him in the 

emergency room.  He could barely open his eyes.  He was not demonstrating any 

signs or symptoms of respiratory compromise, and did not have any conversational

dyspnea or accessory muscle use.  His white count was 4.2, hemoglobin 16, he

matocrit 46.2, and platelet count 201,000.  PT/INR PTT were normal.  Sodium 136,

potassium 4.4, chlorides 104, CO2 24, anion gap 8, BUN 17, and creatinine 0.94. 

AST was 96, FiO2 104, LDH was 723, and C-reactive protein was 133.9.  There was 

no d-dimer ordered.  A chest x-ray did reveal bilateral infiltrates.





The patient is seen today 04/02/2021 in follow-up on the regular medical floor. 

He is currently awake, alert, in no acute distress.  He is still requiring AirVo

high flow nasal cannula at 60 L and 90% FiO2 to maintain O2 saturations in the 

low 90s.  He is currently afebrile.  Hemodynamically stable.  Blood cultures 

reveal no growth.  He remains on Lovenox, Decadron, vitamin supplements. 





The patient is seen today 04/03/2021 in follow-up on the regular medical floor. 

He is currently resting on his right side in bed.  Still quite short of breath 

with minimal exertion.  Still requiring AirVo high flow oxygen at 60 L and 90% 

FiO2 to maintain O2 saturations in the 90s.  Chest x-ray continues to show 

persistent interstitial opacities improving and the left but now worsening on 

the right mid and lower lungs.  D-dimer 0.91.  .  C-reactive protein 2O2.

 He remains on dexamethasone, Lovenox, vitamin supplements.





The patient is seen today 04/04/2021 in follow-up in the intensive care unit.  

He was transferred here last evening after developing increasing shortness of 

breath and increasing oxygen requirements.  He is currently resting fairly 

comfortably in bed.  He remains on BiPAP 14/7 and 100% FiO2 to maintain O2 

saturations in the low 90s.  Chest x-ray continues to show persistent right 

greater than left diffuse interstitial opacities with interval decrease in the 

right lung base and mild increase on the left.  He did receive Tocilizumab and 

convalescent plasma yesterday.  He remains on Lovenox, IV Solu-Medrol, vitamin 

supplements.  White count 10.1.  Hemoglobin 15.5.  Lymphocytes 0.4.  D-dimer 

1.27.  Sodium 140.  Potassium 4.6.  Creatinine 0.82.  LDH 10,025.  C-reactive 

protein 171.





Progress note dated 04/05/2021.





The patient was admitted to the hospital on March 31.  He came to the ICU on 

April 3.  He is currently unfortunately on BiPAP at 14/7, and 100%.  He is 

getting saline at 75 mL an hour.  He is quite short of breath.  The patient did 

receive both convalescent plasma, and TOCI on April 3.  The patient doesn't feel

like he is getting better.  He doesn't feel any worse but just doesn't feel like

he is improving.  He does realize, that in the end, he may end up on the 

mechanical ventilator.  White count 14.7, hemoglobin 15.7, hematocrit 46.0, 

platelet count 321,000.  D-dimer is 1.45, and fibrinogen is 673.  Sodium 143, 

potassium 4.3, chlorides 109, CO2 26, anion gap 8, BUN 41, and creatinine 0.94. 

Most recent LDH is 1025.  The most recent C-reactive protein is 64.1.  Chest x-

ray continues to show bilateral diffuse interstitial infiltrates.





Progress note dated 04/06/2021.





50-year-old male, admitted to the hospital on March 31.  He came in to the ICU 

on April 3.  Currently, the patient's on BiPAP with IPAP of 14, EPAP of 7.  FiO2

is 100%.  When he is not on BiPAP, the patient's on 15 L high flow nasal O2.  In

addition, when he is on the high flow nasal oxygen, he also has a nonrebreather 

mask on as well.  The patient's getting saline at 75 mL an hour.  White count 

14, hemoglobin 14.7, hematocrit 43.7, and platelet count 328,000.  D-dimer 1.76.

 Sodium 142, potassium 4.8, chlorides 112, CO2 26, anion gap 4, BUN and creati

nine were 39 and 0.91.  C-reactive protein is down to 29.6.  Chest x-ray shows 

diffuse bilateral infiltrates.





The patient is seen today 04/07/2021 in follow-up in the intensive care unit.  

He is currently sitting up in bed.  Awake and alert.  Currently on 15 L high 

flow nasal cannula along with 100% nonrebreather mask.  He did not utilize the 

BiPAP last evening.  He has 0.9 normal saline at 75 ML's per hour.  Still 

dyspneic with minimal exertion.  Chest x-ray continues to show diffuse mixed int

erstitial and alveolar infiltrates.  Stable compared to previous.  Blood culture

reveals no growth.  White count 13.6.  Hemoglobin 14.8.  Lymphocytes 0.4.  

Sodium 142.  Potassium 4.2.  Creatinine 0.86.  LDH 1127, C-reactive protein 

17.6.  He remains on bronchodilators, IV Solu-Medrol, Lovenox, vitamin 

supplements.





Objective





- Vital Signs


Vital signs: 


                                   Vital Signs











Temp  98.2 F   04/07/21 04:00


 


Pulse  55 L  04/07/21 07:00


 


Resp  27 H  04/07/21 07:00


 


BP  135/86   04/07/21 07:00


 


Pulse Ox  85 L  04/07/21 07:00








                                 Intake & Output











 04/06/21 04/07/21 04/07/21





 18:59 06:59 18:59


 


Intake Total 1620 900 75


 


Output Total 600 1050 0


 


Balance 1020 -150 75


 


Weight  107 kg 107 kg


 


Intake:   


 


   900 75


 


    Sodium Chloride 0.9% 1, 900 900 75





    000 ml @ 75 mls/hr IV .   





    [id number] [facility] Rx#:[id number]   


 


  Oral 720  


 


Output:   


 


  Urine 600 1050 0


 


Other:   


 


  Voiding Method Urinal Urinal 


 


  # Bowel Movements 1  














- Exam





GENERAL EXAM: Alert, pleasant 50-year-old gentleman, on 15 L high flow nasal 

cannula plus a nonrebreather mask, comfortable in no apparent distress.


HEAD: Normocephalic.


EYES: Normal reaction of pupils, equal size.


NOSE: Clear with pink turbinates.


THROAT: No erythema or exudates.


NECK: No masses, no JVD.


CHEST: No chest wall deformity.


LUNGS: Equal air entry with basilar crackles, right greater than left.


CVS: S1 and S2 normal with no audible murmur, regular rhythm.


ABDOMEN: No hepatosplenomegaly, normal bowel sounds, no guarding or rigidity.


SPINE: No scoliosis or deformity


SKIN: No rashes


CENTRAL NERVOUS SYSTEM: No focal deficits, tone is normal in all 4 extremities.


EXTREMITIES: There is no peripheral edema.  No clubbing, no cyanosis.  

Peripheral pulses are intact.





- Labs


CBC & Chem 7: 


                                 04/07/21 09:11





                                 04/07/21 09:11


Labs: 


                  Abnormal Lab Results - Last 24 Hours (Table)











  04/06/21 04/07/21 04/07/21 Range/Units





  03:34 09:11 09:11 


 


WBC   13.6 H   (3.8-10.6)  k/uL


 


Neutrophils #   12.6 H   (1.3-7.7)  k/uL


 


Lymphocytes #   0.4 L   (1.0-4.8)  k/uL


 


Chloride    110 H  ()  mmol/L


 


BUN    33 H  (9-20)  mg/dL


 


Glucose    141 H  (74-99)  mg/dL


 


Ferritin  956.4 H    (22.0-322.0)  ng/mL


 


ALT    80 H  (4-49)  U/L


 


Lactate Dehydrogenase    1127 H  (313-618)  U/L


 


Creatine Kinase    224 H  ()  U/L


 


C-Reactive Protein    17.6 H  (<10.0)  mg/L


 


Albumin    3.1 L  (3.5-5.0)  g/dL








                      Microbiology - Last 24 Hours (Table)











 03/31/21 22:53 Blood Culture - Final





 Blood    No Growth after 144 hours


 


 03/31/21 22:38 Blood Culture - Final





 Blood    No Growth after 144 hours














Assessment and Plan


Assessment: 





1 Acute hypoxic respiratory failure secondary to CoVID 19 pneumonia.  Outside 

the window for Remdesivir.  Did receive tocilizumab and 1 unit of convalescent 

plasma





2 History of chronic tobacco dependence





Plan: 





The patient was seen and evaluated by Dr. Perry


Chest x-ray and labs reviewed


Did receive tocilizumab and convalescent plasma


Continue Lovenox, IV Solu-Medrol, vitamin supplements


Encouraged frequent position changes


Currently on 15 L high flow nasal cannula plus a nonrebreather mask


Off the BiPAP since yesterday morning


Follow-up chest x-ray, inflammatory markers in the a.m.


We will continue to follow





Critical care time 35 minutes.





I, the cosigning physician, performed a history & physical examination of the 

patient. Lungs sounds with crackles in the bilateral posterior bases, right 

greater than left.  Maintaining good O2 saturations in the 90s on 15 L high flow

nasal cannula plus a nonrebreather mask. I discussed the assessment and plan of 

care with my nurse practitioner, Africa Boudreaux. I attest to the above note as mehdi blackwood by her.

## 2021-04-07 NOTE — P.PN
Subjective


Progress Note Date: 04/07/21


Malcom Griffith is a 51 yo M with PMH of allergies who presented to the ED via EMS

with worsening malaise and shortness of breath. He states he developed a cough 

and sore throat about 2 weeks ago which has worsened since then. He complains of

fever chills and shortness of breath. On arrival he was febrile tachypenic and 

hypoxic SpO2 94% on 4 L O2. WBC 5.8, lymphopenia, , , COVID 

positive, CXR with coarse bilateral infiltrates.





[id number] maintained on 90% airflow, O2 sats in the low 90s.  Positive 

nonproductive cough .  Outside window for Remdesevir, continue on steroids, 

vitamins,lovenox. Chest pain, palpitations, complains of chronic back pain.  T-

max 100.2.  Preliminary blood cultures reporting no growth at 24 hours





04/05/2021 status post convalescent plasma, TOCI, maintaining O2 sats in the 90s

on 100% BiPAP.  Chest x-ray reporting stable diffuse bilateral infiltrates.  

Afebrile, T-max 99.2, WBC 14.7.  Hemoglobin 15.7, platelets 321.  BUN 41, 

creatinine 0.94. D-dimer 1.45, ferritin 1511.4, LDH 1025, CRP 54.1.





04/06/2021 alternating between BiPAP and 15 L high flow nasal cannula combined 

with nonrebreather, maintaining O2 sats in the high 80s.  Chest x-ray reporting 

diffuse bilateral infiltrates stable. T-max 99,WBC 14.  Hemoglobin 14.7, 

platelets 328 D-dimer 1.76, ferritin and CRP decreased.  BUN 39, creatinine 

0.91.








04/07/2021 maintained on 15 L-nasal cannula with  nonrebreather mask, IV 

steroids, bronchodilators,maintaining O2 sats in the high 80s.  Did not Require 

BiPAP during the night.  Feels better.  Nonproductive cough.  Complains of mild 

chest tightness with coughing. Chest x-ray reporting stable diffuse bilateral 

infiltrate.  Afebrile, WBC 13.6.  Hemoglobin 14.8, platelets 300. BUN 33, 

creatinine 0.86.  Blood sugars controlled. LDH elevated, 1127, CRP down to 17.6.







Objective





- Vital Signs


Vital signs: 


                                   Vital Signs











Temp  98.4 F   04/07/21 08:00


 


Pulse  68   04/07/21 14:00


 


Resp  36 H  04/07/21 14:00


 


BP  122/80   04/07/21 14:00


 


Pulse Ox  87 L  04/07/21 14:00








                                 Intake & Output











 04/06/21 04/07/21 04/07/21





 18:59 06:59 18:59


 


Intake Total 6545 501 7693


 


Output Total 600 1050 0


 


Balance 1020 -150 1130


 


Weight  107 kg 107 kg


 


Intake:   


 


   900 530


 


    Sodium Chloride 0.9% 1, 900 900 530





    000 ml @ 75 mls/hr IV .   





    [id number] [facility] Rx#:[id number]   


 


  Oral 720  600


 


Output:   


 


  Urine 600 1050 0


 


Other:   


 


  Voiding Method Urinal Urinal Urinal


 


  # Bowel Movements 1  














- Exam





General: Sitting up in bed, wearing high flow nasal cannula/NRB mask


Eyes: PERRL, EOMI, conjunctiva normal


HENT: normocephalic,


Neck: supple, no JVD


Lungs: Bilateral bases diminished, scattered rhonchi, bilateral rales


CV: Regular rate and rhythm, no murmur. Peripheral pulses 2+


Abdomen: soft, nondistended, no organomegy, positive bowel sounds


Skin: warm and dry.


Neuro: A&Ox3, normal mood and affect








- Labs


CBC & Chem 7: 


                                 04/07/21 09:11





                                 04/07/21 09:11


Labs: 


                  Abnormal Lab Results - Last 24 Hours (Table)











  04/07/21 04/07/21 Range/Units





  09:11 09:11 


 


WBC  13.6 H   (3.8-10.6)  k/uL


 


Neutrophils #  12.6 H   (1.3-7.7)  k/uL


 


Lymphocytes #  0.4 L   (1.0-4.8)  k/uL


 


Chloride   110 H  ()  mmol/L


 


BUN   33 H  (9-20)  mg/dL


 


Glucose   141 H  (74-99)  mg/dL


 


ALT   80 H  (4-49)  U/L


 


Lactate Dehydrogenase   1127 H  (313-618)  U/L


 


Creatine Kinase   224 H  ()  U/L


 


C-Reactive Protein   17.6 H  (<10.0)  mg/L


 


Albumin   3.1 L  (3.5-5.0)  g/dL








                      Microbiology - Last 24 Hours (Table)











 03/31/21 22:53 Blood Culture - Final





 Blood    No Growth after 144 hours


 


 03/31/21 22:38 Blood Culture - Final





 Blood    No Growth after 144 hours














Assessment and Plan


Assessment: 


Severe sepsis secondary to Covid pneumonia, beyond window for Remdesevir





Acute hypoxic respiratory failure secondary to the above





Gastroesophageal reflux disease





Chronic low back pain





Former nicotine dependence




















Plan: Continue on current medication regime ,monitoring and symptomatic nicolás

atment. Maintain Covid regimen. ICU management as per pulmonary/intensivist. 

Prognosis guarded.

















The impression and plan of care has been dictated as directed.





:


I performed a history and examination of this patient,  discussed the same with 

the dictator.  I agree with the dictator's note ,documented as a scribe.  Any 

additional findings or plans will be noted.

## 2021-04-08 LAB
ALBUMIN SERPL-MCNC: 3.1 G/DL (ref 3.5–5)
ALP SERPL-CCNC: 66 U/L (ref 38–126)
ALT SERPL-CCNC: 83 U/L (ref 4–49)
ANION GAP SERPL CALC-SCNC: 7 MMOL/L
AST SERPL-CCNC: 48 U/L (ref 17–59)
BASOPHILS # BLD AUTO: 0 K/UL (ref 0–0.2)
BASOPHILS NFR BLD AUTO: 0 %
BUN SERPL-SCNC: 33 MG/DL (ref 9–20)
CALCIUM SPEC-MCNC: 8.6 MG/DL (ref 8.4–10.2)
CHLORIDE SERPL-SCNC: 109 MMOL/L (ref 98–107)
CO2 SERPL-SCNC: 22 MMOL/L (ref 22–30)
EOSINOPHIL # BLD AUTO: 0 K/UL (ref 0–0.7)
EOSINOPHIL NFR BLD AUTO: 0 %
ERYTHROCYTE [DISTWIDTH] IN BLOOD BY AUTOMATED COUNT: 5.32 M/UL (ref 4.3–5.9)
ERYTHROCYTE [DISTWIDTH] IN BLOOD: 12.5 % (ref 11.5–15.5)
GLUCOSE SERPL-MCNC: 112 MG/DL (ref 74–99)
HCT VFR BLD AUTO: 45.1 % (ref 39–53)
HGB BLD-MCNC: 15.3 GM/DL (ref 13–17.5)
LDH SERPL P TO L-CCNC: 382 U/L (ref 120–250)
LDH1 CFR SERPL ELPH: 15 % (ref 19–38)
LDH2 CFR SERPL ELPH: 32 % (ref 30–43)
LDH3 CFR SERPL ELPH: 24 % (ref 16–26)
LDH4 CFR SERPL ELPH: 12 % (ref 3–12)
LDH5 CFR SERPL ELPH: 17 % (ref 3–14)
LYMPHOCYTES # SPEC AUTO: 0.4 K/UL (ref 1–4.8)
LYMPHOCYTES NFR SPEC AUTO: 3 %
MCH RBC QN AUTO: 28.8 PG (ref 25–35)
MCHC RBC AUTO-ENTMCNC: 34 G/DL (ref 31–37)
MCV RBC AUTO: 84.7 FL (ref 80–100)
MONOCYTES # BLD AUTO: 0.5 K/UL (ref 0–1)
MONOCYTES NFR BLD AUTO: 4 %
NEUTROPHILS # BLD AUTO: 12.4 K/UL (ref 1.3–7.7)
NEUTROPHILS NFR BLD AUTO: 93 %
PLATELET # BLD AUTO: 290 K/UL (ref 150–450)
POTASSIUM SERPL-SCNC: 4.3 MMOL/L (ref 3.5–5.1)
PROT SERPL-MCNC: 6.1 G/DL (ref 6.3–8.2)
SODIUM SERPL-SCNC: 138 MMOL/L (ref 137–145)
WBC # BLD AUTO: 13.3 K/UL (ref 3.8–10.6)

## 2021-04-08 RX ADMIN — METHYLPREDNISOLONE SODIUM SUCCINATE SCH MG: 125 INJECTION, POWDER, FOR SOLUTION INTRAMUSCULAR; INTRAVENOUS at 13:30

## 2021-04-08 RX ADMIN — Medication SCH MG: at 08:01

## 2021-04-08 RX ADMIN — Medication SCH MCG: at 08:01

## 2021-04-08 RX ADMIN — ENOXAPARIN SODIUM SCH MG: 40 INJECTION SUBCUTANEOUS at 08:01

## 2021-04-08 RX ADMIN — METHYLPREDNISOLONE SODIUM SUCCINATE SCH MG: 125 INJECTION, POWDER, FOR SOLUTION INTRAMUSCULAR; INTRAVENOUS at 18:47

## 2021-04-08 RX ADMIN — OXYCODONE HYDROCHLORIDE AND ACETAMINOPHEN SCH MG: 500 TABLET ORAL at 08:01

## 2021-04-08 RX ADMIN — TAMSULOSIN HYDROCHLORIDE SCH MG: 0.4 CAPSULE ORAL at 08:01

## 2021-04-08 RX ADMIN — CEFAZOLIN SCH MLS/HR: 330 INJECTION, POWDER, FOR SOLUTION INTRAMUSCULAR; INTRAVENOUS at 18:48

## 2021-04-08 RX ADMIN — METHYLPREDNISOLONE SODIUM SUCCINATE SCH MG: 125 INJECTION, POWDER, FOR SOLUTION INTRAMUSCULAR; INTRAVENOUS at 05:36

## 2021-04-08 RX ADMIN — PANTOPRAZOLE SODIUM SCH MG: 40 INJECTION, POWDER, FOR SOLUTION INTRAVENOUS at 08:01

## 2021-04-08 RX ADMIN — METHYLPREDNISOLONE SODIUM SUCCINATE SCH MG: 125 INJECTION, POWDER, FOR SOLUTION INTRAMUSCULAR; INTRAVENOUS at 23:49

## 2021-04-08 NOTE — P.PN
Subjective


Progress Note Date: 04/08/21


Principal diagnosis: 





CoVID 19 pneumonia





50-year-old male, with history of shortness of breath, who presents to the 

emergency department on March 31, a little after 9:00 PM.  He apparently was 

brought in by EMS.  I saw the patient in the emergency room, in room 33.  He was

on O2 several liters by nasal cannula and not receiving any IV fluids.  He has 

been sick for about 11 or 12 days.  He apparently just tested positive today.  

His symptoms included shortness of breath, weakness, fatigue, low saturations, 

and fever.  As an outpatient, he was on Tylenol, a Z-Alex, Claritin, Zofran, 

Flomax, and a Medrol Dosepak.  He does have ALLERGIES to penicillin antibiotics,

and sulfa antibiotics.  The patient was quite fatigued when I saw him in the 

emergency room.  He could barely open his eyes.  He was not demonstrating any 

signs or symptoms of respiratory compromise, and did not have any conversational

dyspnea or accessory muscle use.  His white count was 4.2, hemoglobin 16, he

matocrit 46.2, and platelet count 201,000.  PT/INR PTT were normal.  Sodium 136,

potassium 4.4, chlorides 104, CO2 24, anion gap 8, BUN 17, and creatinine 0.94. 

AST was 96, FiO2 104, LDH was 723, and C-reactive protein was 133.9.  There was 

no d-dimer ordered.  A chest x-ray did reveal bilateral infiltrates.





The patient is seen today 04/02/2021 in follow-up on the regular medical floor. 

He is currently awake, alert, in no acute distress.  He is still requiring AirVo

high flow nasal cannula at 60 L and 90% FiO2 to maintain O2 saturations in the 

low 90s.  He is currently afebrile.  Hemodynamically stable.  Blood cultures 

reveal no growth.  He remains on Lovenox, Decadron, vitamin supplements. 





The patient is seen today 04/03/2021 in follow-up on the regular medical floor. 

He is currently resting on his right side in bed.  Still quite short of breath 

with minimal exertion.  Still requiring AirVo high flow oxygen at 60 L and 90% 

FiO2 to maintain O2 saturations in the 90s.  Chest x-ray continues to show 

persistent interstitial opacities improving and the left but now worsening on 

the right mid and lower lungs.  D-dimer 0.91.  .  C-reactive protein 2O2.

 He remains on dexamethasone, Lovenox, vitamin supplements.





The patient is seen today 04/04/2021 in follow-up in the intensive care unit.  

He was transferred here last evening after developing increasing shortness of 

breath and increasing oxygen requirements.  He is currently resting fairly 

comfortably in bed.  He remains on BiPAP 14/7 and 100% FiO2 to maintain O2 

saturations in the low 90s.  Chest x-ray continues to show persistent right 

greater than left diffuse interstitial opacities with interval decrease in the 

right lung base and mild increase on the left.  He did receive Tocilizumab and 

convalescent plasma yesterday.  He remains on Lovenox, IV Solu-Medrol, vitamin 

supplements.  White count 10.1.  Hemoglobin 15.5.  Lymphocytes 0.4.  D-dimer 

1.27.  Sodium 140.  Potassium 4.6.  Creatinine 0.82.  LDH 10,025.  C-reactive 

protein 171.





Progress note dated 04/05/2021.





The patient was admitted to the hospital on March 31.  He came to the ICU on 

April 3.  He is currently unfortunately on BiPAP at 14/7, and 100%.  He is 

getting saline at 75 mL an hour.  He is quite short of breath.  The patient did 

receive both convalescent plasma, and TOCI on April 3.  The patient doesn't feel

like he is getting better.  He doesn't feel any worse but just doesn't feel like

he is improving.  He does realize, that in the end, he may end up on the 

mechanical ventilator.  White count 14.7, hemoglobin 15.7, hematocrit 46.0, 

platelet count 321,000.  D-dimer is 1.45, and fibrinogen is 673.  Sodium 143, 

potassium 4.3, chlorides 109, CO2 26, anion gap 8, BUN 41, and creatinine 0.94. 

Most recent LDH is 1025.  The most recent C-reactive protein is 64.1.  Chest x-

ray continues to show bilateral diffuse interstitial infiltrates.





Progress note dated 04/06/2021.





50-year-old male, admitted to the hospital on March 31.  He came in to the ICU 

on April 3.  Currently, the patient's on BiPAP with IPAP of 14, EPAP of 7.  FiO2

is 100%.  When he is not on BiPAP, the patient's on 15 L high flow nasal O2.  In

addition, when he is on the high flow nasal oxygen, he also has a nonrebreather 

mask on as well.  The patient's getting saline at 75 mL an hour.  White count 

14, hemoglobin 14.7, hematocrit 43.7, and platelet count 328,000.  D-dimer 1.76.

 Sodium 142, potassium 4.8, chlorides 112, CO2 26, anion gap 4, BUN and creati

nine were 39 and 0.91.  C-reactive protein is down to 29.6.  Chest x-ray shows 

diffuse bilateral infiltrates.





The patient is seen today 04/07/2021 in follow-up in the intensive care unit.  

He is currently sitting up in bed.  Awake and alert.  Currently on 15 L high 

flow nasal cannula along with 100% nonrebreather mask.  He did not utilize the 

BiPAP last evening.  He has 0.9 normal saline at 75 ML's per hour.  Still 

dyspneic with minimal exertion.  Chest x-ray continues to show diffuse mixed int

erstitial and alveolar infiltrates.  Stable compared to previous.  Blood culture

reveals no growth.  White count 13.6.  Hemoglobin 14.8.  Lymphocytes 0.4.  

Sodium 142.  Potassium 4.2.  Creatinine 0.86.  LDH 1127, C-reactive protein 

17.6.  He remains on bronchodilators, IV Solu-Medrol, Lovenox, vitamin 

supplements.





The patient is seen today 04/08/2021 in follow-up in the intensive care unit.  

He is currently resting fairly comfortably in bed.  No significant events 

overnight.  He is maintaining O2 saturations in the upper 80s and low 90s on 15 

L high flow nasal cannula and addition to a nonrebreather mask.  0.9 normal 

saline at 75 mL per hour.  Chest x-ray shows no changes and bilateral infi

ltrates.  White count 13.3.  Hemoglobin 15.3.  Lymphocytes 0.4.  D-dimer 3.36.  

Sodium 138.  Potassium 4.3.  Creatinine 0.72.  C-reactive protein 14.4.  He 

remains on Lovenox, IV Solu-Medrol, vitamin supplements.





Objective





- Vital Signs


Vital signs: 


                                   Vital Signs











Temp  98.6 F   04/08/21 08:00


 


Pulse  63   04/08/21 09:00


 


Resp  32 H  04/08/21 09:00


 


BP  141/80   04/08/21 09:00


 


Pulse Ox  84 L  04/08/21 09:00








                                 Intake & Output











 04/07/21 04/08/21 04/08/21





 18:59 06:59 18:59


 


Intake Total 1740 900 325


 


Output Total 350 375 450


 


Balance 1390 525 -125


 


Weight 107 kg 108 kg 


 


Intake:   


 


   900 225


 


    Sodium Chloride 0.9% 1, 900 900 225





    000 ml @ 75 mls/hr IV .   





    [id number] [facility] Rx#:[id number]   


 


  Oral 840  100


 


Output:   


 


  Urine 350 375 450


 


Other:   


 


  Voiding Method Urinal Urinal Urinal


 


  # Voids   1














- Exam





GENERAL EXAM: Alert, pleasant 50-year-old gentleman, on 15 L high flow nasal 

cannula plus a nonrebreather mask, comfortable in no apparent distress.


HEAD: Normocephalic.


EYES: Normal reaction of pupils, equal size.


NOSE: Clear with pink turbinates.


THROAT: No erythema or exudates.


NECK: No masses, no JVD.


CHEST: No chest wall deformity.


LUNGS: Equal air entry with basilar crackles.


CVS: S1 and S2 normal with no audible murmur, regular rhythm.


ABDOMEN: No hepatosplenomegaly, normal bowel sounds, no guarding or rigidity.


SPINE: No scoliosis or deformity


SKIN: No rashes


CENTRAL NERVOUS SYSTEM: No focal deficits, tone is normal in all 4 extremities.


EXTREMITIES: There is no peripheral edema.  No clubbing, no cyanosis.  

Peripheral pulses are intact.





- Labs


CBC & Chem 7: 


                                 04/08/21 04:32





                                 04/08/21 04:32


Labs: 


                  Abnormal Lab Results - Last 24 Hours (Table)











  04/07/21 04/08/21 04/08/21 Range/Units





  09:11 04:32 04:32 


 


WBC   13.3 H   (3.8-10.6)  k/uL


 


Neutrophils #   12.4 H   (1.3-7.7)  k/uL


 


Lymphocytes #   0.4 L   (1.0-4.8)  k/uL


 


D-Dimer    3.36 H  (<0.60)  mg/L FEU


 


Chloride     ()  mmol/L


 


BUN     (9-20)  mg/dL


 


Glucose     (74-99)  mg/dL


 


Ferritin  1106.7 H    (22.0-322.0)  ng/mL


 


ALT     (4-49)  U/L


 


C-Reactive Protein     (<10.0)  mg/L


 


Total Protein     (6.3-8.2)  g/dL


 


Albumin     (3.5-5.0)  g/dL














  04/08/21 Range/Units





  04:32 


 


WBC   (3.8-10.6)  k/uL


 


Neutrophils #   (1.3-7.7)  k/uL


 


Lymphocytes #   (1.0-4.8)  k/uL


 


D-Dimer   (<0.60)  mg/L FEU


 


Chloride  109 H  ()  mmol/L


 


BUN  33 H  (9-20)  mg/dL


 


Glucose  112 H  (74-99)  mg/dL


 


Ferritin   (22.0-322.0)  ng/mL


 


ALT  83 H  (4-49)  U/L


 


C-Reactive Protein  14.4 H  (<10.0)  mg/L


 


Total Protein  6.1 L  (6.3-8.2)  g/dL


 


Albumin  3.1 L  (3.5-5.0)  g/dL














Assessment and Plan


Assessment: 





1 Acute hypoxic respiratory failure secondary to CoVID 19 pneumonia.  Outside 

the window for Remdesivir.  Did receive tocilizumab and 1 unit of convalescent 

plasma





2 History of chronic tobacco dependence





Plan: 





The patient was seen and evaluated by Dr. Perry


Chest x-ray and labs reviewed


Continue Lovenox, IV Solu-Medrol, vitamin supplements


Again encouraged frequent position changes, in a chair as tolerated


Currently on 15 L high flow nasal cannula plus a nonrebreather mask


Follow-up chest x-ray, inflammatory markers in the a.m.


We will continue to follow





Critical care time 36 minutes.





I, the cosigning physician, performed a history & physical examination of the 

patient. Lungs sounds with crackles in the bilateral posterior bases.  

Maintaining good O2 saturations in the 90s on 15 L high flow nasal cannula plus 

a nonrebreather mask. I discussed the assessment and plan of care with my nurse 

practitioner, Africa Boudreaux. I attest to the above note as dictated by her.

## 2021-04-08 NOTE — XR
EXAMINATION TYPE: XR chest 1V portable

 

DATE OF EXAM: 4/8/2021

 

HISTORY: Shortness of breath.

 

COMPARISON: 4/7/21

 

TECHNIQUE: Single view of the chest is submitted.

 

FINDINGS:

Demonstrated are scattered senescent parenchymal change.  

 

No change in bilateral infiltrates.

 

The heart is stable.

 

Hilar and mediastinal structures are within normal limits.  

 

Degenerative changes are seen of the dorsal spine. 

 

 IMPRESSION: 

 

1.  No change in bilateral infiltrates.

## 2021-04-08 NOTE — P.PN
Subjective


Progress Note Date: 04/08/21


Malcom Griffith is a 49 yo M with PMH of allergies who presented to the ED via EMS

with worsening malaise and shortness of breath. He states he developed a cough 

and sore throat about 2 weeks ago which has worsened since then. He complains of

fever chills and shortness of breath. On arrival he was febrile tachypenic and 

hypoxic SpO2 94% on 4 L O2. WBC 5.8, lymphopenia, , , COVID 

positive, CXR with coarse bilateral infiltrates.





[id number] maintained on 90% airflow, O2 sats in the low 90s.  Positive 

nonproductive cough .  Outside window for Remdesevir, continue on steroids, 

vitamins,lovenox. Chest pain, palpitations, complains of chronic back pain.  T-

max 100.2.  Preliminary blood cultures reporting no growth at 24 hours





04/05/2021 status post convalescent plasma, TOCI, maintaining O2 sats in the 90s

on 100% BiPAP.  Chest x-ray reporting stable diffuse bilateral infiltrates.  

Afebrile, T-max 99.2, WBC 14.7.  Hemoglobin 15.7, platelets 321.  BUN 41, 

creatinine 0.94. D-dimer 1.45, ferritin 1511.4, LDH 1025, CRP 54.1.





04/06/2021 alternating between BiPAP and 15 L high flow nasal cannula combined 

with nonrebreather, maintaining O2 sats in the high 80s.  Chest x-ray reporting 

diffuse bilateral infiltrates stable. T-max 99,WBC 14.  Hemoglobin 14.7, 

platelets 328 D-dimer 1.76, ferritin and CRP decreased.  BUN 39, creatinine 

0.91.








04/07/2021 maintained on 15 L-nasal cannula with  nonrebreather mask, IV 

steroids, bronchodilators,maintaining O2 sats in the high 80s.  Did not Require 

BiPAP during the night.  Feels better.  Nonproductive cough.  Complains of mild 

chest tightness with coughing. Chest x-ray reporting stable diffuse bilateral 

infiltrate.  Afebrile, WBC 13.6.  Hemoglobin 14.8, platelets 300. BUN 33, 

creatinine 0.86.  Blood sugars controlled. LDH elevated, 1127, CRP down to 17.6.










04/08/2021  continue on IV steroids, vitamins, maintaining O2 sats in the mid to

high 80s on 15 L nasal cannula plus nonrebreather.  Nonproductive cough.  

Reports she feels better.  Chest x-ray reporting no change in bilateral 

infiltrates.  Afebrile.  Creatinine 0.72.





Objective





- Vital Signs


Vital signs: 


                                   Vital Signs











Temp  98.3 F   04/08/21 12:00


 


Pulse  64   04/08/21 14:00


 


Resp  27 H  04/08/21 14:00


 


BP  147/89   04/08/21 14:00


 


Pulse Ox  91 L  04/08/21 14:00








                                 Intake & Output











 04/07/21 04/08/21 04/08/21





 18:59 06:59 18:59


 


Intake Total 1740 900 950


 


Output Total 350 375 450


 


Balance 1390 525 500


 


Weight 107 kg 108 kg 


 


Intake:   


 


   900 600


 


    Sodium Chloride 0.9% 1, 900 900 600





    000 ml @ 75 mls/hr IV .   





    [id number] [facility] Rx#:[id number]   


 


  Oral 840  350


 


Output:   


 


  Urine 350 375 450


 


Other:   


 


  Voiding Method Urinal Urinal Urinal


 


  # Voids   1














- Exam





General: Sitting up in bed, wearing high flow nasal cannula/NRB mask


Eyes: PERRL, EOMI, conjunctiva normal


HENT: normocephalic,


Neck: supple, no JVD


Lungs: Bilateral bases diminished, bilateral rales


CV: Regular rate and rhythm, no murmur. Peripheral pulses 2+


Abdomen: soft, nondistended, no organomegy, positive bowel sounds


Skin: warm and dry.


Neuro: A&Ox3, normal mood and affect








- Labs


CBC & Chem 7: 


                                 04/08/21 04:32





                                 04/08/21 04:32


Labs: 


                  Abnormal Lab Results - Last 24 Hours (Table)











  04/05/21 04/07/21 04/08/21 Range/Units





  04:12 09:11 04:32 


 


WBC    13.3 H  (3.8-10.6)  k/uL


 


Neutrophils #    12.4 H  (1.3-7.7)  k/uL


 


Lymphocytes #    0.4 L  (1.0-4.8)  k/uL


 


D-Dimer     (<0.60)  mg/L FEU


 


Chloride     ()  mmol/L


 


BUN     (9-20)  mg/dL


 


Glucose     (74-99)  mg/dL


 


Ferritin   1106.7 H   (22.0-322.0)  ng/mL


 


ALT     (4-49)  U/L


 


LD Isoenzymes  382 H    (120-250)  U/L


 


LD 1  15 L    (19-38)  %


 


LD 5  17 H    (3-14)  %


 


C-Reactive Protein     (<10.0)  mg/L


 


Total Protein     (6.3-8.2)  g/dL


 


Albumin     (3.5-5.0)  g/dL














  04/08/21 04/08/21 Range/Units





  04:32 04:32 


 


WBC    (3.8-10.6)  k/uL


 


Neutrophils #    (1.3-7.7)  k/uL


 


Lymphocytes #    (1.0-4.8)  k/uL


 


D-Dimer  3.36 H   (<0.60)  mg/L FEU


 


Chloride   109 H  ()  mmol/L


 


BUN   33 H  (9-20)  mg/dL


 


Glucose   112 H  (74-99)  mg/dL


 


Ferritin    (22.0-322.0)  ng/mL


 


ALT   83 H  (4-49)  U/L


 


LD Isoenzymes    (120-250)  U/L


 


LD 1    (19-38)  %


 


LD 5    (3-14)  %


 


C-Reactive Protein   14.4 H  (<10.0)  mg/L


 


Total Protein   6.1 L  (6.3-8.2)  g/dL


 


Albumin   3.1 L  (3.5-5.0)  g/dL














Assessment and Plan


Assessment: 


Severe sepsis secondary to Covid pneumonia, beyond window for Remdesevir





Acute hypoxic respiratory failure secondary to the above





Gastroesophageal reflux disease





Chronic low back pain





Former nicotine dependence




















Plan: Continue on current medication regime ,monitoring and symptomatic 

treatment.ICU management as per pulmonary/intensivist.Covid regimen.  Prognosis 

guarded.

















The impression and plan of care has been dictated as directed.





:


I performed a history and examination of this patient,  discussed the same with 

the dictator.  I agree with the dictator's note ,documented as a scribe.  Any 

additional findings or plans will be noted.

## 2021-04-09 LAB
ANION GAP SERPL CALC-SCNC: 7 MMOL/L
BASOPHILS # BLD AUTO: 0 K/UL (ref 0–0.2)
BASOPHILS NFR BLD AUTO: 0 %
BUN SERPL-SCNC: 29 MG/DL (ref 9–20)
CALCIUM SPEC-MCNC: 8.4 MG/DL (ref 8.4–10.2)
CHLORIDE SERPL-SCNC: 109 MMOL/L (ref 98–107)
CO2 BLDA-SCNC: 24 MMOL/L (ref 19–24)
CO2 SERPL-SCNC: 22 MMOL/L (ref 22–30)
EOSINOPHIL # BLD AUTO: 0 K/UL (ref 0–0.7)
EOSINOPHIL NFR BLD AUTO: 0 %
ERYTHROCYTE [DISTWIDTH] IN BLOOD BY AUTOMATED COUNT: 5.26 M/UL (ref 4.3–5.9)
ERYTHROCYTE [DISTWIDTH] IN BLOOD: 12.3 % (ref 11.5–15.5)
GLUCOSE SERPL-MCNC: 119 MG/DL (ref 74–99)
HCO3 BLDA-SCNC: 23 MMOL/L (ref 21–25)
HCT VFR BLD AUTO: 43.7 % (ref 39–53)
HGB BLD-MCNC: 15.2 GM/DL (ref 13–17.5)
LDH SPEC-CCNC: 1196 U/L (ref 313–618)
LYMPHOCYTES # SPEC AUTO: 0.3 K/UL (ref 1–4.8)
LYMPHOCYTES NFR SPEC AUTO: 3 %
MCH RBC QN AUTO: 28.9 PG (ref 25–35)
MCHC RBC AUTO-ENTMCNC: 34.7 G/DL (ref 31–37)
MCV RBC AUTO: 83.2 FL (ref 80–100)
MONOCYTES # BLD AUTO: 0.5 K/UL (ref 0–1)
MONOCYTES NFR BLD AUTO: 4 %
NEUTROPHILS # BLD AUTO: 11.3 K/UL (ref 1.3–7.7)
NEUTROPHILS NFR BLD AUTO: 92 %
PCO2 BLDA: 30 MMHG (ref 35–45)
PH BLDA: 7.49 [PH] (ref 7.35–7.45)
PLATELET # BLD AUTO: 273 K/UL (ref 150–450)
PO2 BLDA: 49 MMHG (ref 83–108)
POTASSIUM SERPL-SCNC: 4.3 MMOL/L (ref 3.5–5.1)
SODIUM SERPL-SCNC: 138 MMOL/L (ref 137–145)
WBC # BLD AUTO: 12.2 K/UL (ref 3.8–10.6)

## 2021-04-09 RX ADMIN — ALBUTEROL SULFATE PRN PUFF: 90 AEROSOL, METERED RESPIRATORY (INHALATION) at 08:01

## 2021-04-09 RX ADMIN — CEFAZOLIN SCH MLS/HR: 330 INJECTION, POWDER, FOR SOLUTION INTRAMUSCULAR; INTRAVENOUS at 17:09

## 2021-04-09 RX ADMIN — OXYCODONE HYDROCHLORIDE AND ACETAMINOPHEN SCH MG: 500 TABLET ORAL at 09:56

## 2021-04-09 RX ADMIN — METHYLPREDNISOLONE SODIUM SUCCINATE SCH MG: 125 INJECTION, POWDER, FOR SOLUTION INTRAMUSCULAR; INTRAVENOUS at 06:10

## 2021-04-09 RX ADMIN — PANTOPRAZOLE SODIUM SCH MG: 40 INJECTION, POWDER, FOR SOLUTION INTRAVENOUS at 09:56

## 2021-04-09 RX ADMIN — Medication SCH MG: at 09:56

## 2021-04-09 RX ADMIN — TAMSULOSIN HYDROCHLORIDE SCH MG: 0.4 CAPSULE ORAL at 09:56

## 2021-04-09 RX ADMIN — ENOXAPARIN SODIUM SCH MG: 40 INJECTION SUBCUTANEOUS at 09:56

## 2021-04-09 RX ADMIN — METHYLPREDNISOLONE SODIUM SUCCINATE SCH MG: 125 INJECTION, POWDER, FOR SOLUTION INTRAMUSCULAR; INTRAVENOUS at 17:09

## 2021-04-09 RX ADMIN — CEFAZOLIN SCH MLS/HR: 330 INJECTION, POWDER, FOR SOLUTION INTRAMUSCULAR; INTRAVENOUS at 06:10

## 2021-04-09 RX ADMIN — Medication SCH MCG: at 09:56

## 2021-04-09 RX ADMIN — METHYLPREDNISOLONE SODIUM SUCCINATE SCH MG: 125 INJECTION, POWDER, FOR SOLUTION INTRAMUSCULAR; INTRAVENOUS at 12:31

## 2021-04-09 RX ADMIN — ALBUTEROL SULFATE PRN PUFF: 90 AEROSOL, METERED RESPIRATORY (INHALATION) at 17:31

## 2021-04-09 NOTE — XR
EXAMINATION TYPE: XR chest 1V portable

 

DATE OF EXAM: 4/9/2021

 

COMPARISON: 4/8/2021

 

HISTORY: Cough

 

TECHNIQUE: Single frontal view of the chest is obtained.

 

FINDINGS:  Interstitial and patchy basilar infiltrate stable. No pleural effusion or pneumothorax. He
art size normal. Arthropathy of the shoulders.

 

IMPRESSION:  Bilateral areas of infiltrate stable.

## 2021-04-09 NOTE — P.PN
Subjective


Progress Note Date: 04/09/21


Malcom Griffith is a 49 yo M with PMH of allergies who presented to the ED via EMS

with worsening malaise and shortness of breath. He states he developed a cough 

and sore throat about 2 weeks ago which has worsened since then. He complains of

fever chills and shortness of breath. On arrival he was febrile tachypenic and 

hypoxic SpO2 94% on 4 L O2. WBC 5.8, lymphopenia, , , COVID 

positive, CXR with coarse bilateral infiltrates.





[id number] maintained on 90% airflow, O2 sats in the low 90s.  Positive 

nonproductive cough .  Outside window for Remdesevir, continue on steroids, 

vitamins,lovenox. Chest pain, palpitations, complains of chronic back pain.  T-

max 100.2.  Preliminary blood cultures reporting no growth at 24 hours





04/05/2021 status post convalescent plasma, TOCI, maintaining O2 sats in the 90s

on 100% BiPAP.  Chest x-ray reporting stable diffuse bilateral infiltrates.  

Afebrile, T-max 99.2, WBC 14.7.  Hemoglobin 15.7, platelets 321.  BUN 41, 

creatinine 0.94. D-dimer 1.45, ferritin 1511.4, LDH 1025, CRP 54.1.





04/06/2021 alternating between BiPAP and 15 L high flow nasal cannula combined 

with nonrebreather, maintaining O2 sats in the high 80s.  Chest x-ray reporting 

diffuse bilateral infiltrates stable. T-max 99,WBC 14.  Hemoglobin 14.7, 

platelets 328 D-dimer 1.76, ferritin and CRP decreased.  BUN 39, creatinine 

0.91.








04/07/2021 maintained on 15 L-nasal cannula with  nonrebreather mask, IV 

steroids, bronchodilators,maintaining O2 sats in the high 80s.  Did not Require 

BiPAP during the night.  Feels better.  Nonproductive cough.  Complains of mild 

chest tightness with coughing. Chest x-ray reporting stable diffuse bilateral 

infiltrate.  Afebrile, WBC 13.6.  Hemoglobin 14.8, platelets 300. BUN 33, 

creatinine 0.86.  Blood sugars controlled. LDH elevated, 1127, CRP down to 17.6.










04/08/2021  continue on IV steroids, vitamins, maintaining O2 sats in the mid to

high 80s on 15 L nasal cannula plus nonrebreather.  Nonproductive cough.  

Reports she feels better.  Chest x-ray reporting no change in bilateral 

infiltrates.  Afebrile.  Creatinine 0.72.





04/09/2021 Continues on airflow high flow oxygen at 60 L/m and 90% FiO2 along 

with a nonrebreather mask, maintaining O2 sats in the high 80s.   Minimal cough.

 Complains of dry mouth.  He reports, continues to feel better.  Afebrile, WBC 

down to 12.2.  D-dimer, CRP decreased.  LDH increased ,1196.





Objective





- Vital Signs


Vital signs: 


                                   Vital Signs











Temp  98.3 F   04/09/21 12:00


 


Pulse  78   04/09/21 13:00


 


Resp  37 H  04/09/21 13:00


 


BP  146/86   04/09/21 13:00


 


Pulse Ox  84 L  04/09/21 13:00








                                 Intake & Output











 04/08/21 04/09/21 04/09/21





 18:59 06:59 18:59


 


Intake Total 1450 1100 525


 


Output Total 450 1450 30


 


Balance 1000 -350 495


 


Weight  112.3 kg 112.3 kg


 


Intake:   


 


   900 525


 


    Sodium Chloride 0.9% 1, 900 900 525





    000 ml @ 75 mls/hr IV .   





    [id number] [facility] Rx#:[id number]   


 


  Oral 550 200 


 


Output:   


 


  Urine 450 1450 30


 


Other:   


 


  Voiding Method Urinal Urinal Urinal


 


  # Voids 1 1 














- Exam





General: Sitting up in bed, wearing high flow nasal cannula/NRB mask


Eyes: PERRL, EOMI, conjunctiva normal


HENT: normocephalic,


Lungs: Bilateral bases diminished, bilateral rales


CV: Regular rate and rhythm, no murmur. Peripheral pulses 2+


Abdomen: soft, nondistended, no organomegy, positive bowel sounds


Skin: warm and dry.


Neuro: A&Ox3, normal mood and affect








- Labs


CBC & Chem 7: 


                                 04/09/21 03:38





                                 04/09/21 03:38


Labs: 


                  Abnormal Lab Results - Last 24 Hours (Table)











  04/05/21 04/09/21 04/09/21 Range/Units





  04:12 02:23 03:38 


 


WBC    12.2 H  (3.8-10.6)  k/uL


 


Neutrophils #    11.3 H  (1.3-7.7)  k/uL


 


Lymphocytes #    0.3 L  (1.0-4.8)  k/uL


 


D-Dimer     (<0.60)  mg/L FEU


 


ABG pH   7.49 H   (7.35-7.45)  


 


ABG pCO2   30 L   (35-45)  mmHg


 


ABG pO2   49 L*   ()  mmHg


 


ABG O2 Saturation   85.7 L   (94-97)  %


 


Chloride     ()  mmol/L


 


BUN     (9-20)  mg/dL


 


Glucose     (74-99)  mg/dL


 


Lactate Dehydrogenase     (313-618)  U/L


 


LD Isoenzymes  382 H    (120-250)  U/L


 


LD 1  15 L    (19-38)  %


 


LD 5  17 H    (3-14)  %


 


C-Reactive Protein     (<10.0)  mg/L














  04/09/21 04/09/21 Range/Units





  03:38 03:38 


 


WBC    (3.8-10.6)  k/uL


 


Neutrophils #    (1.3-7.7)  k/uL


 


Lymphocytes #    (1.0-4.8)  k/uL


 


D-Dimer  2.84 H   (<0.60)  mg/L FEU


 


ABG pH    (7.35-7.45)  


 


ABG pCO2    (35-45)  mmHg


 


ABG pO2    ()  mmHg


 


ABG O2 Saturation    (94-97)  %


 


Chloride   109 H  ()  mmol/L


 


BUN   29 H  (9-20)  mg/dL


 


Glucose   119 H  (74-99)  mg/dL


 


Lactate Dehydrogenase   1196 H  (313-618)  U/L


 


LD Isoenzymes    (120-250)  U/L


 


LD 1    (19-38)  %


 


LD 5    (3-14)  %


 


C-Reactive Protein   10.4 H  (<10.0)  mg/L














Assessment and Plan


Assessment: 


Severe sepsis secondary to Covid pneumonia, beyond window for Remdesevir





Acute hypoxic respiratory failure secondary to the above





Gastroesophageal reflux disease





Chronic low back pain





Former nicotine dependence




















Plan: Continue on current medication regime ,monitoring and symptomatic 

treatment.ICU management as per pulmonary/intensivist.Covid regimen.  Prognosis 

guarded.

















The impression and plan of care has been dictated as directed.





:


I performed a history and examination of this patient,  discussed the same with 

the dictator.  I agree with the dictator's note ,documented as a scribe.  Any 

additional findings or plans will be noted.

## 2021-04-09 NOTE — P.PN
Subjective


Progress Note Date: 04/09/21


Principal diagnosis: 





CoVID 19 pneumonia





50-year-old male, with history of shortness of breath, who presents to the 

emergency department on March 31, a little after 9:00 PM.  He apparently was 

brought in by EMS.  I saw the patient in the emergency room, in room 33.  He was

on O2 several liters by nasal cannula and not receiving any IV fluids.  He has 

been sick for about 11 or 12 days.  He apparently just tested positive today.  

His symptoms included shortness of breath, weakness, fatigue, low saturations, 

and fever.  As an outpatient, he was on Tylenol, a Z-Alex, Claritin, Zofran, 

Flomax, and a Medrol Dosepak.  He does have ALLERGIES to penicillin antibiotics,

and sulfa antibiotics.  The patient was quite fatigued when I saw him in the 

emergency room.  He could barely open his eyes.  He was not demonstrating any 

signs or symptoms of respiratory compromise, and did not have any conversational

dyspnea or accessory muscle use.  His white count was 4.2, hemoglobin 16, he

matocrit 46.2, and platelet count 201,000.  PT/INR PTT were normal.  Sodium 136,

potassium 4.4, chlorides 104, CO2 24, anion gap 8, BUN 17, and creatinine 0.94. 

AST was 96, FiO2 104, LDH was 723, and C-reactive protein was 133.9.  There was 

no d-dimer ordered.  A chest x-ray did reveal bilateral infiltrates.





The patient is seen today 04/02/2021 in follow-up on the regular medical floor. 

He is currently awake, alert, in no acute distress.  He is still requiring AirVo

high flow nasal cannula at 60 L and 90% FiO2 to maintain O2 saturations in the 

low 90s.  He is currently afebrile.  Hemodynamically stable.  Blood cultures 

reveal no growth.  He remains on Lovenox, Decadron, vitamin supplements. 





The patient is seen today 04/03/2021 in follow-up on the regular medical floor. 

He is currently resting on his right side in bed.  Still quite short of breath 

with minimal exertion.  Still requiring AirVo high flow oxygen at 60 L and 90% 

FiO2 to maintain O2 saturations in the 90s.  Chest x-ray continues to show 

persistent interstitial opacities improving and the left but now worsening on 

the right mid and lower lungs.  D-dimer 0.91.  .  C-reactive protein 2O2.

 He remains on dexamethasone, Lovenox, vitamin supplements.





The patient is seen today 04/04/2021 in follow-up in the intensive care unit.  

He was transferred here last evening after developing increasing shortness of 

breath and increasing oxygen requirements.  He is currently resting fairly 

comfortably in bed.  He remains on BiPAP 14/7 and 100% FiO2 to maintain O2 

saturations in the low 90s.  Chest x-ray continues to show persistent right 

greater than left diffuse interstitial opacities with interval decrease in the 

right lung base and mild increase on the left.  He did receive Tocilizumab and 

convalescent plasma yesterday.  He remains on Lovenox, IV Solu-Medrol, vitamin 

supplements.  White count 10.1.  Hemoglobin 15.5.  Lymphocytes 0.4.  D-dimer 

1.27.  Sodium 140.  Potassium 4.6.  Creatinine 0.82.  LDH 10,025.  C-reactive 

protein 171.





Progress note dated 04/05/2021.





The patient was admitted to the hospital on March 31.  He came to the ICU on 

April 3.  He is currently unfortunately on BiPAP at 14/7, and 100%.  He is 

getting saline at 75 mL an hour.  He is quite short of breath.  The patient did 

receive both convalescent plasma, and TOCI on April 3.  The patient doesn't feel

like he is getting better.  He doesn't feel any worse but just doesn't feel like

he is improving.  He does realize, that in the end, he may end up on the 

mechanical ventilator.  White count 14.7, hemoglobin 15.7, hematocrit 46.0, 

platelet count 321,000.  D-dimer is 1.45, and fibrinogen is 673.  Sodium 143, 

potassium 4.3, chlorides 109, CO2 26, anion gap 8, BUN 41, and creatinine 0.94. 

Most recent LDH is 1025.  The most recent C-reactive protein is 64.1.  Chest x-

ray continues to show bilateral diffuse interstitial infiltrates.





Progress note dated 04/06/2021.





50-year-old male, admitted to the hospital on March 31.  He came in to the ICU 

on April 3.  Currently, the patient's on BiPAP with IPAP of 14, EPAP of 7.  FiO2

is 100%.  When he is not on BiPAP, the patient's on 15 L high flow nasal O2.  In

addition, when he is on the high flow nasal oxygen, he also has a nonrebreather 

mask on as well.  The patient's getting saline at 75 mL an hour.  White count 

14, hemoglobin 14.7, hematocrit 43.7, and platelet count 328,000.  D-dimer 1.76.

 Sodium 142, potassium 4.8, chlorides 112, CO2 26, anion gap 4, BUN and creati

nine were 39 and 0.91.  C-reactive protein is down to 29.6.  Chest x-ray shows 

diffuse bilateral infiltrates.





The patient is seen today 04/07/2021 in follow-up in the intensive care unit.  

He is currently sitting up in bed.  Awake and alert.  Currently on 15 L high 

flow nasal cannula along with 100% nonrebreather mask.  He did not utilize the 

BiPAP last evening.  He has 0.9 normal saline at 75 ML's per hour.  Still 

dyspneic with minimal exertion.  Chest x-ray continues to show diffuse mixed int

erstitial and alveolar infiltrates.  Stable compared to previous.  Blood culture

reveals no growth.  White count 13.6.  Hemoglobin 14.8.  Lymphocytes 0.4.  

Sodium 142.  Potassium 4.2.  Creatinine 0.86.  LDH 1127, C-reactive protein 

17.6.  He remains on bronchodilators, IV Solu-Medrol, Lovenox, vitamin 

supplements.





The patient is seen today 04/08/2021 in follow-up in the intensive care unit.  

He is currently resting fairly comfortably in bed.  No significant events 

overnight.  He is maintaining O2 saturations in the upper 80s and low 90s on 15 

L high flow nasal cannula and addition to a nonrebreather mask.  0.9 normal 

saline at 75 mL per hour.  Chest x-ray shows no changes and bilateral infi

ltrates.  White count 13.3.  Hemoglobin 15.3.  Lymphocytes 0.4.  D-dimer 3.36.  

Sodium 138.  Potassium 4.3.  Creatinine 0.72.  C-reactive protein 14.4.  He 

remains on Lovenox, IV Solu-Medrol, vitamin supplements.





The patient is seen today 04/09/2021 follow-up in the intensive care unit.  He 

is currently sitting up in bed.  Awake and alert.  In mild respiratory distress.

 Still quite dyspneic with minimal exertion.  Still with oxygen desaturations 

with minimal exertion.  He is currently on airflow high flow oxygen at 60 L/m 

and 90% FiO2 along with a nonrebreather mask to maintain O2 saturations 85-86%. 

Morning blood gases revealed a PaO2 of 49, pCO2 30 and a pH of 7.48.  0.9 normal

sinus 75 ML's per hour.  He has received a unit of convalescent plasma.  Blood 

cultures reveal no growth.  White count 12.2.  Hemoglobin 15.2.  Lymphocytes 

0.3.  D-dimer 2.84.  Sodium 138.  Potassium 4.3.  Creatinine 0.69.  LDH 1196.  

C-reactive protein 10.4.  Remains on IV Solu-Medrol, rhonchi dilators, Lovenox, 

vitamin supplements.





Objective





- Vital Signs


Vital signs: 


                                   Vital Signs











Temp  98 F   04/09/21 08:00


 


Pulse  71   04/09/21 10:00


 


Resp  26 H  04/09/21 10:00


 


BP  147/82   04/09/21 10:00


 


Pulse Ox  88 L  04/09/21 10:00








                                 Intake & Output











 04/08/21 04/09/21 04/09/21





 18:59 06:59 18:59


 


Intake Total 1450 1100 150


 


Output Total 450 1450 30


 


Balance 1000 -350 120


 


Weight  112.3 kg 


 


Intake:   


 


   900 150


 


    Sodium Chloride 0.9% 1, 900 900 150





    000 ml @ 75 mls/hr IV .   





    [id number] [facility] Rx#:[id number]   


 


  Oral 550 200 


 


Output:   


 


  Urine 450 1450 30


 


Other:   


 


  Voiding Method Urinal Urinal Urinal


 


  # Voids 1 1 














- Exam





GENERAL EXAM: Alert, pleasant 50-year-old gentleman, on AirVo at 60 L/m, 90% 

FiO2, plus a nonrebreather mask, comfortable in mild respiratory distress.


HEAD: Normocephalic.


EYES: Normal reaction of pupils, equal size.


NOSE: Clear with pink turbinates.


THROAT: No erythema or exudates.


NECK: No masses, no JVD.


CHEST: No chest wall deformity.


LUNGS: Equal air entry with basilar crackles.


CVS: S1 and S2 normal with no audible murmur, regular rhythm.


ABDOMEN: No hepatosplenomegaly, normal bowel sounds, no guarding or rigidity.


SPINE: No scoliosis or deformity


SKIN: No rashes


CENTRAL NERVOUS SYSTEM: No focal deficits, tone is normal in all 4 extremities.


EXTREMITIES: There is no peripheral edema.  No clubbing, no cyanosis.  Periphe

ral pulses are intact.





- Labs


CBC & Chem 7: 


                                 04/09/21 03:38





                                 04/09/21 03:38


Labs: 


                  Abnormal Lab Results - Last 24 Hours (Table)











  04/05/21 04/09/21 04/09/21 Range/Units





  04:12 02:23 03:38 


 


WBC    12.2 H  (3.8-10.6)  k/uL


 


Neutrophils #    11.3 H  (1.3-7.7)  k/uL


 


Lymphocytes #    0.3 L  (1.0-4.8)  k/uL


 


D-Dimer     (<0.60)  mg/L FEU


 


ABG pH   7.49 H   (7.35-7.45)  


 


ABG pCO2   30 L   (35-45)  mmHg


 


ABG pO2   49 L*   ()  mmHg


 


ABG O2 Saturation   85.7 L   (94-97)  %


 


Chloride     ()  mmol/L


 


BUN     (9-20)  mg/dL


 


Glucose     (74-99)  mg/dL


 


Lactate Dehydrogenase     (313-618)  U/L


 


LD Isoenzymes  382 H    (120-250)  U/L


 


LD 1  15 L    (19-38)  %


 


LD 5  17 H    (3-14)  %


 


C-Reactive Protein     (<10.0)  mg/L














  04/09/21 04/09/21 Range/Units





  03:38 03:38 


 


WBC    (3.8-10.6)  k/uL


 


Neutrophils #    (1.3-7.7)  k/uL


 


Lymphocytes #    (1.0-4.8)  k/uL


 


D-Dimer  2.84 H   (<0.60)  mg/L FEU


 


ABG pH    (7.35-7.45)  


 


ABG pCO2    (35-45)  mmHg


 


ABG pO2    ()  mmHg


 


ABG O2 Saturation    (94-97)  %


 


Chloride   109 H  ()  mmol/L


 


BUN   29 H  (9-20)  mg/dL


 


Glucose   119 H  (74-99)  mg/dL


 


Lactate Dehydrogenase   1196 H  (313-618)  U/L


 


LD Isoenzymes    (120-250)  U/L


 


LD 1    (19-38)  %


 


LD 5    (3-14)  %


 


C-Reactive Protein   10.4 H  (<10.0)  mg/L














Assessment and Plan


Assessment: 





1 Acute hypoxic respiratory failure secondary to CoVID 19 pneumonia.  Outside 

the window for Remdesivir.  Did receive tocilizumab and 1 unit of convalescent 

plasma





2 History of chronic tobacco dependence





Plan: 





The patient was seen and evaluated by Dr. Perry


Chest x-ray and labs reviewed


Continue Lovenox, IV Solu-Medrol, vitamin supplements


Again encouraged frequent position changes


Currently on AirVo 60L/min at 90% plus a nonrebreather mask


Follow-up chest x-ray in the a.m.


We will continue to follow





Critical care time 38 minutes.





I, the cosigning physician, performed a history & physical examination of the 

patient. Lungs sounds with crackles in the bilateral posterior bases.  

Maintaining good O2 saturations in the 90s on AirVo at 60 L a minute with FiO2 

at 90%, plus a nonrebreather mask. I discussed the assessment and plan of care 

with my nurse practitioner, Africa Boudreaux. I attest to the above note as dictated 

by her.

## 2021-04-10 LAB
ALBUMIN SERPL-MCNC: 2.8 G/DL (ref 3.5–5)
ALP SERPL-CCNC: 64 U/L (ref 38–126)
ALT SERPL-CCNC: 111 U/L (ref 4–49)
ANION GAP SERPL CALC-SCNC: 5 MMOL/L
AST SERPL-CCNC: 47 U/L (ref 17–59)
BASOPHILS # BLD AUTO: 0 K/UL (ref 0–0.2)
BASOPHILS NFR BLD AUTO: 0 %
BUN SERPL-SCNC: 27 MG/DL (ref 9–20)
CALCIUM SPEC-MCNC: 8.4 MG/DL (ref 8.4–10.2)
CHLORIDE SERPL-SCNC: 107 MMOL/L (ref 98–107)
CO2 SERPL-SCNC: 23 MMOL/L (ref 22–30)
EOSINOPHIL # BLD AUTO: 0 K/UL (ref 0–0.7)
EOSINOPHIL NFR BLD AUTO: 0 %
ERYTHROCYTE [DISTWIDTH] IN BLOOD BY AUTOMATED COUNT: 5.21 M/UL (ref 4.3–5.9)
ERYTHROCYTE [DISTWIDTH] IN BLOOD: 12.3 % (ref 11.5–15.5)
GLUCOSE SERPL-MCNC: 109 MG/DL (ref 74–99)
HCT VFR BLD AUTO: 43.5 % (ref 39–53)
HGB BLD-MCNC: 15.1 GM/DL (ref 13–17.5)
LDH SPEC-CCNC: 1213 U/L (ref 313–618)
LYMPHOCYTES # SPEC AUTO: 0.4 K/UL (ref 1–4.8)
LYMPHOCYTES NFR SPEC AUTO: 3 %
MCH RBC QN AUTO: 29 PG (ref 25–35)
MCHC RBC AUTO-ENTMCNC: 34.7 G/DL (ref 31–37)
MCV RBC AUTO: 83.5 FL (ref 80–100)
MONOCYTES # BLD AUTO: 0.6 K/UL (ref 0–1)
MONOCYTES NFR BLD AUTO: 5 %
NEUTROPHILS # BLD AUTO: 11.5 K/UL (ref 1.3–7.7)
NEUTROPHILS NFR BLD AUTO: 91 %
PLATELET # BLD AUTO: 272 K/UL (ref 150–450)
POTASSIUM SERPL-SCNC: 4.3 MMOL/L (ref 3.5–5.1)
PROT SERPL-MCNC: 5.6 G/DL (ref 6.3–8.2)
SODIUM SERPL-SCNC: 135 MMOL/L (ref 137–145)
WBC # BLD AUTO: 12.7 K/UL (ref 3.8–10.6)

## 2021-04-10 RX ADMIN — METHYLPREDNISOLONE SODIUM SUCCINATE SCH MG: 125 INJECTION, POWDER, FOR SOLUTION INTRAMUSCULAR; INTRAVENOUS at 23:42

## 2021-04-10 RX ADMIN — TAMSULOSIN HYDROCHLORIDE SCH MG: 0.4 CAPSULE ORAL at 09:40

## 2021-04-10 RX ADMIN — METHYLPREDNISOLONE SODIUM SUCCINATE SCH MG: 125 INJECTION, POWDER, FOR SOLUTION INTRAMUSCULAR; INTRAVENOUS at 18:53

## 2021-04-10 RX ADMIN — ALBUTEROL SULFATE PRN PUFF: 90 AEROSOL, METERED RESPIRATORY (INHALATION) at 09:22

## 2021-04-10 RX ADMIN — METHYLPREDNISOLONE SODIUM SUCCINATE SCH MG: 125 INJECTION, POWDER, FOR SOLUTION INTRAMUSCULAR; INTRAVENOUS at 13:09

## 2021-04-10 RX ADMIN — Medication SCH MCG: at 09:40

## 2021-04-10 RX ADMIN — Medication SCH MG: at 09:40

## 2021-04-10 RX ADMIN — CEFAZOLIN SCH MLS/HR: 330 INJECTION, POWDER, FOR SOLUTION INTRAMUSCULAR; INTRAVENOUS at 05:28

## 2021-04-10 RX ADMIN — METHYLPREDNISOLONE SODIUM SUCCINATE SCH MG: 125 INJECTION, POWDER, FOR SOLUTION INTRAMUSCULAR; INTRAVENOUS at 00:02

## 2021-04-10 RX ADMIN — ALBUTEROL SULFATE PRN PUFF: 90 AEROSOL, METERED RESPIRATORY (INHALATION) at 20:14

## 2021-04-10 RX ADMIN — OXYCODONE HYDROCHLORIDE AND ACETAMINOPHEN SCH MG: 500 TABLET ORAL at 09:40

## 2021-04-10 RX ADMIN — ENOXAPARIN SODIUM SCH MG: 40 INJECTION SUBCUTANEOUS at 09:40

## 2021-04-10 RX ADMIN — METHYLPREDNISOLONE SODIUM SUCCINATE SCH MG: 125 INJECTION, POWDER, FOR SOLUTION INTRAMUSCULAR; INTRAVENOUS at 05:28

## 2021-04-10 RX ADMIN — ALBUTEROL SULFATE PRN PUFF: 90 AEROSOL, METERED RESPIRATORY (INHALATION) at 11:43

## 2021-04-10 RX ADMIN — CEFAZOLIN SCH MLS/HR: 330 INJECTION, POWDER, FOR SOLUTION INTRAMUSCULAR; INTRAVENOUS at 18:53

## 2021-04-10 RX ADMIN — PANTOPRAZOLE SODIUM SCH MG: 40 INJECTION, POWDER, FOR SOLUTION INTRAVENOUS at 09:40

## 2021-04-10 NOTE — P.PN
Subjective


Progress Note Date: 04/10/21


Principal diagnosis: 





CoVID 19 pneumonia





50-year-old male, with history of shortness of breath, who presents to the 

emergency department on March 31, a little after 9:00 PM.  He apparently was 

brought in by EMS.  I saw the patient in the emergency room, in room 33.  He was

on O2 several liters by nasal cannula and not receiving any IV fluids.  He has 

been sick for about 11 or 12 days.  He apparently just tested positive today.  

His symptoms included shortness of breath, weakness, fatigue, low saturations, 

and fever.  As an outpatient, he was on Tylenol, a Z-Alex, Claritin, Zofran, 

Flomax, and a Medrol Dosepak.  He does have ALLERGIES to penicillin antibiotics,

and sulfa antibiotics.  The patient was quite fatigued when I saw him in the 

emergency room.  He could barely open his eyes.  He was not demonstrating any 

signs or symptoms of respiratory compromise, and did not have any conversational

dyspnea or accessory muscle use.  His white count was 4.2, hemoglobin 16, he

matocrit 46.2, and platelet count 201,000.  PT/INR PTT were normal.  Sodium 136,

potassium 4.4, chlorides 104, CO2 24, anion gap 8, BUN 17, and creatinine 0.94. 

AST was 96, FiO2 104, LDH was 723, and C-reactive protein was 133.9.  There was 

no d-dimer ordered.  A chest x-ray did reveal bilateral infiltrates.





The patient is seen today 04/02/2021 in follow-up on the regular medical floor. 

He is currently awake, alert, in no acute distress.  He is still requiring AirVo

high flow nasal cannula at 60 L and 90% FiO2 to maintain O2 saturations in the 

low 90s.  He is currently afebrile.  Hemodynamically stable.  Blood cultures 

reveal no growth.  He remains on Lovenox, Decadron, vitamin supplements. 





The patient is seen today 04/03/2021 in follow-up on the regular medical floor. 

He is currently resting on his right side in bed.  Still quite short of breath 

with minimal exertion.  Still requiring AirVo high flow oxygen at 60 L and 90% 

FiO2 to maintain O2 saturations in the 90s.  Chest x-ray continues to show 

persistent interstitial opacities improving and the left but now worsening on 

the right mid and lower lungs.  D-dimer 0.91.  .  C-reactive protein 2O2.

 He remains on dexamethasone, Lovenox, vitamin supplements.





The patient is seen today 04/04/2021 in follow-up in the intensive care unit.  

He was transferred here last evening after developing increasing shortness of 

breath and increasing oxygen requirements.  He is currently resting fairly 

comfortably in bed.  He remains on BiPAP 14/7 and 100% FiO2 to maintain O2 

saturations in the low 90s.  Chest x-ray continues to show persistent right 

greater than left diffuse interstitial opacities with interval decrease in the 

right lung base and mild increase on the left.  He did receive Tocilizumab and 

convalescent plasma yesterday.  He remains on Lovenox, IV Solu-Medrol, vitamin 

supplements.  White count 10.1.  Hemoglobin 15.5.  Lymphocytes 0.4.  D-dimer 

1.27.  Sodium 140.  Potassium 4.6.  Creatinine 0.82.  LDH 10,025.  C-reactive 

protein 171.





Progress note dated 04/05/2021.





The patient was admitted to the hospital on March 31.  He came to the ICU on 

April 3.  He is currently unfortunately on BiPAP at 14/7, and 100%.  He is 

getting saline at 75 mL an hour.  He is quite short of breath.  The patient did 

receive both convalescent plasma, and TOCI on April 3.  The patient doesn't feel

like he is getting better.  He doesn't feel any worse but just doesn't feel like

he is improving.  He does realize, that in the end, he may end up on the 

mechanical ventilator.  White count 14.7, hemoglobin 15.7, hematocrit 46.0, 

platelet count 321,000.  D-dimer is 1.45, and fibrinogen is 673.  Sodium 143, 

potassium 4.3, chlorides 109, CO2 26, anion gap 8, BUN 41, and creatinine 0.94. 

Most recent LDH is 1025.  The most recent C-reactive protein is 64.1.  Chest x-

ray continues to show bilateral diffuse interstitial infiltrates.





Progress note dated 04/06/2021.





50-year-old male, admitted to the hospital on March 31.  He came in to the ICU 

on April 3.  Currently, the patient's on BiPAP with IPAP of 14, EPAP of 7.  FiO2

is 100%.  When he is not on BiPAP, the patient's on 15 L high flow nasal O2.  In

addition, when he is on the high flow nasal oxygen, he also has a nonrebreather 

mask on as well.  The patient's getting saline at 75 mL an hour.  White count 

14, hemoglobin 14.7, hematocrit 43.7, and platelet count 328,000.  D-dimer 1.76.

 Sodium 142, potassium 4.8, chlorides 112, CO2 26, anion gap 4, BUN and creati

nine were 39 and 0.91.  C-reactive protein is down to 29.6.  Chest x-ray shows 

diffuse bilateral infiltrates.





The patient is seen today 04/07/2021 in follow-up in the intensive care unit.  

He is currently sitting up in bed.  Awake and alert.  Currently on 15 L high 

flow nasal cannula along with 100% nonrebreather mask.  He did not utilize the 

BiPAP last evening.  He has 0.9 normal saline at 75 ML's per hour.  Still 

dyspneic with minimal exertion.  Chest x-ray continues to show diffuse mixed int

erstitial and alveolar infiltrates.  Stable compared to previous.  Blood culture

reveals no growth.  White count 13.6.  Hemoglobin 14.8.  Lymphocytes 0.4.  

Sodium 142.  Potassium 4.2.  Creatinine 0.86.  LDH 1127, C-reactive protein 

17.6.  He remains on bronchodilators, IV Solu-Medrol, Lovenox, vitamin 

supplements.





The patient is seen today 04/08/2021 in follow-up in the intensive care unit.  

He is currently resting fairly comfortably in bed.  No significant events 

overnight.  He is maintaining O2 saturations in the upper 80s and low 90s on 15 

L high flow nasal cannula and addition to a nonrebreather mask.  0.9 normal 

saline at 75 mL per hour.  Chest x-ray shows no changes and bilateral infi

ltrates.  White count 13.3.  Hemoglobin 15.3.  Lymphocytes 0.4.  D-dimer 3.36.  

Sodium 138.  Potassium 4.3.  Creatinine 0.72.  C-reactive protein 14.4.  He 

remains on Lovenox, IV Solu-Medrol, vitamin supplements.





The patient is seen today 04/09/2021 follow-up in the intensive care unit.  He 

is currently sitting up in bed.  Awake and alert.  In mild respiratory distress.

 Still quite dyspneic with minimal exertion.  Still with oxygen desaturations 

with minimal exertion.  He is currently on airflow high flow oxygen at 60 L/m 

and 90% FiO2 along with a nonrebreather mask to maintain O2 saturations 85-86%. 

Morning blood gases revealed a PaO2 of 49, pCO2 30 and a pH of 7.48.  0.9 normal

sinus 75 ML's per hour.  He has received a unit of convalescent plasma.  Blood 

cultures reveal no growth.  White count 12.2.  Hemoglobin 15.2.  Lymphocytes 

0.3.  D-dimer 2.84.  Sodium 138.  Potassium 4.3.  Creatinine 0.69.  LDH 1196.  

C-reactive protein 10.4.  Remains on IV Solu-Medrol, rhonchi dilators, Lovenox, 

vitamin supplements.





The patient is seen today 04/10/2021 follow-up in the intensive care unit.  He 

is currently sitting up in bed.  Awake and alert in no acute distress.  Still 

quite dyspneic with minimal exertion in conversation.  He remains on AirVo high 

flow oxygen at 60 L and 95% FiO2 along with a nonrebreather mask with O2 

saturations in the mid 80s.  He did receive convalescent plasma and tocilizumab 

back on 04/03/2021.  He has a 0.9 normal saline at 75 mL per hour.  Yesterday's 

blood gases revealed a pO2 of 49, pCO2 of 30 and a pH of 7.5.  Chest x-ray 

revealing slight improvement of bibasilar infiltrates.  Blood cultures reveal no

growth.  White count 12.7.  Hemoglobin 15.1.  D-dimer 2.47.  Sodium 135.  

Potassium 4.3.  Creatinine 0.74.  LDH 1213.  C-reactive protein 8.6.  Continued 

on Lovenox, IV Solu-Medrol, vitamin supplements.





Objective





- Vital Signs


Vital signs: 


                                   Vital Signs











Temp  98.7 F   04/10/21 07:00


 


Pulse  72   04/10/21 07:00


 


Resp  23   04/10/21 07:00


 


BP  130/83   04/10/21 07:00


 


Pulse Ox  87 L  04/10/21 07:10








                                 Intake & Output











 04/09/21 04/10/21 04/10/21





 18:59 06:59 18:59


 


Intake Total 2225 825 75


 


Output Total 780 675 


 


Balance 1445 150 75


 


Weight 112.3 kg 103 kg 


 


Intake:   


 


   825 75


 


    Sodium Chloride 0.9% 1, 825 825 75





    000 ml @ 75 mls/hr IV .   





    [id number] [facility] Rx#:[id number]   


 


  Oral 1400  


 


Output:   


 


  Urine 780 675 


 


Other:   


 


  Voiding Method Urinal Urinal 














- Exam





GENERAL EXAM: Alert, pleasant 50-year-old gentleman, on AirVo at 60 L/m, 90% 

FiO2, plus a nonrebreather mask, comfortable in mild respiratory distress.


HEAD: Normocephalic.


EYES: Normal reaction of pupils, equal size.


NOSE: Clear with pink turbinates.


THROAT: No erythema or exudates.


NECK: No masses, no JVD.


CHEST: No chest wall deformity.


LUNGS: Equal air entry with basilar crackles.


CVS: S1 and S2 normal with no audible murmur, regular rhythm.


ABDOMEN: No hepatosplenomegaly, normal bowel sounds, no guarding or rigidity.


SPINE: No scoliosis or deformity


SKIN: No rashes


CENTRAL NERVOUS SYSTEM: No focal deficits, tone is normal in all 4 extremities.


EXTREMITIES: There is no peripheral edema.  No clubbing, no cyanosis.  Periphe

ral pulses are intact.





- Labs


CBC & Chem 7: 


                                 04/10/21 04:04





                                 04/10/21 04:04


Labs: 


                  Abnormal Lab Results - Last 24 Hours (Table)











  04/10/21 04/10/21 04/10/21 Range/Units





  04:04 04:04 04:04 


 


WBC  12.7 H    (3.8-10.6)  k/uL


 


Neutrophils #  11.5 H    (1.3-7.7)  k/uL


 


Lymphocytes #  0.4 L    (1.0-4.8)  k/uL


 


D-Dimer   2.47 H   (<0.60)  mg/L FEU


 


Sodium    135 L  (137-145)  mmol/L


 


BUN    27 H  (9-20)  mg/dL


 


Glucose    109 H  (74-99)  mg/dL


 


Total Bilirubin    1.4 H  (0.2-1.3)  mg/dL


 


ALT    111 H  (4-49)  U/L


 


Lactate Dehydrogenase    1213 H  (313-618)  U/L


 


Total Protein    5.6 L  (6.3-8.2)  g/dL


 


Albumin    2.8 L  (3.5-5.0)  g/dL














Assessment and Plan


Assessment: 





1 Acute hypoxic respiratory failure secondary to CoVID 19 pneumonia.  Outside 

the window for Remdesivir.  Did receive tocilizumab and 1 unit of convalescent 

plasma





2 History of chronic tobacco dependence





Plan: 





The patient was seen and evaluated by Dr. Perry


Chest x-ray and labs reviewed


Encouraged again frequent position changes


Continue Lovenox, IV Solu-Medrol, vitamin supplements


Currently on AirVo 60L/min at 90% plus a nonrebreather mask


We will continue to follow





Critical care time 36 minutes.





I, the cosigning physician, performed a history & physical examination of the 

patient. Lungs sounds with crackles in the bilateral posterior bases.  

Maintaining good O2 saturations in the 90s on AirVo at 60 L a minute with FiO2 

at 90%, plus a nonrebreather mask. I discussed the assessment and plan of care 

with my nurse practitioner, Africa Boudreaux. I attest to the above note as dictated 

by her.

## 2021-04-10 NOTE — XR
EXAMINATION TYPE: XR chest 1V portable

 

DATE OF EXAM: 4/10/2021

 

COMPARISON: 4/9/2021

 

INDICATION: Covid

 

TECHNIQUE: Single frontal view of the chest is obtained.

 

FINDINGS:  

The heart size is normal.  

The pulmonary vasculature is slightly prominent.  

Diffuse increased lung markings are at the bilateral lung bases slightly improved from comparison. Fi
ndings are nonspecific and can be compatible with atypical pneumonia.  

 

 

IMPRESSION:  

1. Slight improvement of bibasilar infiltrates can be compatible with atypical pneumonia.

## 2021-04-11 LAB
ALBUMIN SERPL-MCNC: 2.8 G/DL (ref 3.5–5)
ALP SERPL-CCNC: 66 U/L (ref 38–126)
ALT SERPL-CCNC: 96 U/L (ref 4–49)
ANION GAP SERPL CALC-SCNC: 6 MMOL/L
AST SERPL-CCNC: 35 U/L (ref 17–59)
BASOPHILS # BLD AUTO: 0 K/UL (ref 0–0.2)
BASOPHILS NFR BLD AUTO: 0 %
BUN SERPL-SCNC: 24 MG/DL (ref 9–20)
CALCIUM SPEC-MCNC: 8.3 MG/DL (ref 8.4–10.2)
CHLORIDE SERPL-SCNC: 107 MMOL/L (ref 98–107)
CO2 SERPL-SCNC: 21 MMOL/L (ref 22–30)
EOSINOPHIL # BLD AUTO: 0 K/UL (ref 0–0.7)
EOSINOPHIL NFR BLD AUTO: 0 %
ERYTHROCYTE [DISTWIDTH] IN BLOOD BY AUTOMATED COUNT: 5.29 M/UL (ref 4.3–5.9)
ERYTHROCYTE [DISTWIDTH] IN BLOOD: 12.4 % (ref 11.5–15.5)
FERRITIN SERPL-MCNC: 1239.3 NG/ML (ref 22–322)
GLUCOSE SERPL-MCNC: 119 MG/DL (ref 74–99)
HCT VFR BLD AUTO: 43.9 % (ref 39–53)
HGB BLD-MCNC: 15.3 GM/DL (ref 13–17.5)
LDH SPEC-CCNC: 1172 U/L (ref 313–618)
LYMPHOCYTES # SPEC AUTO: 0.4 K/UL (ref 1–4.8)
LYMPHOCYTES NFR SPEC AUTO: 3 %
MCH RBC QN AUTO: 28.9 PG (ref 25–35)
MCHC RBC AUTO-ENTMCNC: 34.8 G/DL (ref 31–37)
MCV RBC AUTO: 82.9 FL (ref 80–100)
MONOCYTES # BLD AUTO: 0.4 K/UL (ref 0–1)
MONOCYTES NFR BLD AUTO: 4 %
NEUTROPHILS # BLD AUTO: 10.3 K/UL (ref 1.3–7.7)
NEUTROPHILS NFR BLD AUTO: 92 %
PLATELET # BLD AUTO: 260 K/UL (ref 150–450)
POTASSIUM SERPL-SCNC: 4.4 MMOL/L (ref 3.5–5.1)
PROT SERPL-MCNC: 5.6 G/DL (ref 6.3–8.2)
SODIUM SERPL-SCNC: 134 MMOL/L (ref 137–145)
WBC # BLD AUTO: 11.1 K/UL (ref 3.8–10.6)

## 2021-04-11 RX ADMIN — METHYLPREDNISOLONE SODIUM SUCCINATE SCH MG: 125 INJECTION, POWDER, FOR SOLUTION INTRAMUSCULAR; INTRAVENOUS at 12:27

## 2021-04-11 RX ADMIN — TAMSULOSIN HYDROCHLORIDE SCH MG: 0.4 CAPSULE ORAL at 10:09

## 2021-04-11 RX ADMIN — CEFAZOLIN SCH MLS/HR: 330 INJECTION, POWDER, FOR SOLUTION INTRAMUSCULAR; INTRAVENOUS at 06:11

## 2021-04-11 RX ADMIN — ENOXAPARIN SODIUM SCH MG: 40 INJECTION SUBCUTANEOUS at 10:09

## 2021-04-11 RX ADMIN — METHYLPREDNISOLONE SODIUM SUCCINATE SCH MG: 125 INJECTION, POWDER, FOR SOLUTION INTRAMUSCULAR; INTRAVENOUS at 16:58

## 2021-04-11 RX ADMIN — Medication SCH MG: at 10:10

## 2021-04-11 RX ADMIN — CEFAZOLIN SCH MLS/HR: 330 INJECTION, POWDER, FOR SOLUTION INTRAMUSCULAR; INTRAVENOUS at 21:07

## 2021-04-11 RX ADMIN — OXYCODONE HYDROCHLORIDE AND ACETAMINOPHEN SCH MG: 500 TABLET ORAL at 10:10

## 2021-04-11 RX ADMIN — ALBUTEROL SULFATE PRN PUFF: 90 AEROSOL, METERED RESPIRATORY (INHALATION) at 09:34

## 2021-04-11 RX ADMIN — METHYLPREDNISOLONE SODIUM SUCCINATE SCH MG: 125 INJECTION, POWDER, FOR SOLUTION INTRAMUSCULAR; INTRAVENOUS at 06:10

## 2021-04-11 RX ADMIN — Medication SCH: at 12:11

## 2021-04-11 RX ADMIN — PANTOPRAZOLE SODIUM SCH MG: 40 INJECTION, POWDER, FOR SOLUTION INTRAVENOUS at 10:09

## 2021-04-11 NOTE — P.PN
Subjective


Progress Note Date: 04/11/21


Principal diagnosis: 





CoVID 19 pneumonia





50-year-old male, with history of shortness of breath, who presents to the 

emergency department on March 31, a little after 9:00 PM.  He apparently was 

brought in by EMS.  I saw the patient in the emergency room, in room 33.  He was

on O2 several liters by nasal cannula and not receiving any IV fluids.  He has 

been sick for about 11 or 12 days.  He apparently just tested positive today.  

His symptoms included shortness of breath, weakness, fatigue, low saturations, 

and fever.  As an outpatient, he was on Tylenol, a Z-Alex, Claritin, Zofran, 

Flomax, and a Medrol Dosepak.  He does have ALLERGIES to penicillin antibiotics,

and sulfa antibiotics.  The patient was quite fatigued when I saw him in the 

emergency room.  He could barely open his eyes.  He was not demonstrating any 

signs or symptoms of respiratory compromise, and did not have any conversational

dyspnea or accessory muscle use.  His white count was 4.2, hemoglobin 16, he

matocrit 46.2, and platelet count 201,000.  PT/INR PTT were normal.  Sodium 136,

potassium 4.4, chlorides 104, CO2 24, anion gap 8, BUN 17, and creatinine 0.94. 

AST was 96, FiO2 104, LDH was 723, and C-reactive protein was 133.9.  There was 

no d-dimer ordered.  A chest x-ray did reveal bilateral infiltrates.





The patient is seen today 04/02/2021 in follow-up on the regular medical floor. 

He is currently awake, alert, in no acute distress.  He is still requiring AirVo

high flow nasal cannula at 60 L and 90% FiO2 to maintain O2 saturations in the 

low 90s.  He is currently afebrile.  Hemodynamically stable.  Blood cultures 

reveal no growth.  He remains on Lovenox, Decadron, vitamin supplements. 





The patient is seen today 04/03/2021 in follow-up on the regular medical floor. 

He is currently resting on his right side in bed.  Still quite short of breath 

with minimal exertion.  Still requiring AirVo high flow oxygen at 60 L and 90% 

FiO2 to maintain O2 saturations in the 90s.  Chest x-ray continues to show 

persistent interstitial opacities improving and the left but now worsening on 

the right mid and lower lungs.  D-dimer 0.91.  .  C-reactive protein 2O2.

 He remains on dexamethasone, Lovenox, vitamin supplements.





The patient is seen today 04/04/2021 in follow-up in the intensive care unit.  

He was transferred here last evening after developing increasing shortness of 

breath and increasing oxygen requirements.  He is currently resting fairly 

comfortably in bed.  He remains on BiPAP 14/7 and 100% FiO2 to maintain O2 

saturations in the low 90s.  Chest x-ray continues to show persistent right 

greater than left diffuse interstitial opacities with interval decrease in the 

right lung base and mild increase on the left.  He did receive Tocilizumab and 

convalescent plasma yesterday.  He remains on Lovenox, IV Solu-Medrol, vitamin 

supplements.  White count 10.1.  Hemoglobin 15.5.  Lymphocytes 0.4.  D-dimer 

1.27.  Sodium 140.  Potassium 4.6.  Creatinine 0.82.  LDH 10,025.  C-reactive 

protein 171.





Progress note dated 04/05/2021.





The patient was admitted to the hospital on March 31.  He came to the ICU on 

April 3.  He is currently unfortunately on BiPAP at 14/7, and 100%.  He is 

getting saline at 75 mL an hour.  He is quite short of breath.  The patient did 

receive both convalescent plasma, and TOCI on April 3.  The patient doesn't feel

like he is getting better.  He doesn't feel any worse but just doesn't feel like

he is improving.  He does realize, that in the end, he may end up on the 

mechanical ventilator.  White count 14.7, hemoglobin 15.7, hematocrit 46.0, 

platelet count 321,000.  D-dimer is 1.45, and fibrinogen is 673.  Sodium 143, 

potassium 4.3, chlorides 109, CO2 26, anion gap 8, BUN 41, and creatinine 0.94. 

Most recent LDH is 1025.  The most recent C-reactive protein is 64.1.  Chest x-

ray continues to show bilateral diffuse interstitial infiltrates.





Progress note dated 04/06/2021.





50-year-old male, admitted to the hospital on March 31.  He came in to the ICU 

on April 3.  Currently, the patient's on BiPAP with IPAP of 14, EPAP of 7.  FiO2

is 100%.  When he is not on BiPAP, the patient's on 15 L high flow nasal O2.  In

addition, when he is on the high flow nasal oxygen, he also has a nonrebreather 

mask on as well.  The patient's getting saline at 75 mL an hour.  White count 

14, hemoglobin 14.7, hematocrit 43.7, and platelet count 328,000.  D-dimer 1.76.

 Sodium 142, potassium 4.8, chlorides 112, CO2 26, anion gap 4, BUN and creati

nine were 39 and 0.91.  C-reactive protein is down to 29.6.  Chest x-ray shows 

diffuse bilateral infiltrates.





The patient is seen today 04/07/2021 in follow-up in the intensive care unit.  

He is currently sitting up in bed.  Awake and alert.  Currently on 15 L high 

flow nasal cannula along with 100% nonrebreather mask.  He did not utilize the 

BiPAP last evening.  He has 0.9 normal saline at 75 ML's per hour.  Still 

dyspneic with minimal exertion.  Chest x-ray continues to show diffuse mixed int

erstitial and alveolar infiltrates.  Stable compared to previous.  Blood culture

reveals no growth.  White count 13.6.  Hemoglobin 14.8.  Lymphocytes 0.4.  

Sodium 142.  Potassium 4.2.  Creatinine 0.86.  LDH 1127, C-reactive protein 

17.6.  He remains on bronchodilators, IV Solu-Medrol, Lovenox, vitamin 

supplements.





The patient is seen today 04/08/2021 in follow-up in the intensive care unit.  

He is currently resting fairly comfortably in bed.  No significant events 

overnight.  He is maintaining O2 saturations in the upper 80s and low 90s on 15 

L high flow nasal cannula and addition to a nonrebreather mask.  0.9 normal 

saline at 75 mL per hour.  Chest x-ray shows no changes and bilateral infi

ltrates.  White count 13.3.  Hemoglobin 15.3.  Lymphocytes 0.4.  D-dimer 3.36.  

Sodium 138.  Potassium 4.3.  Creatinine 0.72.  C-reactive protein 14.4.  He 

remains on Lovenox, IV Solu-Medrol, vitamin supplements.





The patient is seen today 04/09/2021 follow-up in the intensive care unit.  He 

is currently sitting up in bed.  Awake and alert.  In mild respiratory distress.

 Still quite dyspneic with minimal exertion.  Still with oxygen desaturations 

with minimal exertion.  He is currently on airflow high flow oxygen at 60 L/m 

and 90% FiO2 along with a nonrebreather mask to maintain O2 saturations 85-86%. 

Morning blood gases revealed a PaO2 of 49, pCO2 30 and a pH of 7.48.  0.9 normal

sinus 75 ML's per hour.  He has received a unit of convalescent plasma.  Blood 

cultures reveal no growth.  White count 12.2.  Hemoglobin 15.2.  Lymphocytes 

0.3.  D-dimer 2.84.  Sodium 138.  Potassium 4.3.  Creatinine 0.69.  LDH 1196.  

C-reactive protein 10.4.  Remains on IV Solu-Medrol, rhonchi dilators, Lovenox, 

vitamin supplements.





The patient is seen today 04/10/2021 follow-up in the intensive care unit.  He 

is currently sitting up in bed.  Awake and alert in no acute distress.  Still 

quite dyspneic with minimal exertion in conversation.  He remains on AirVo high 

flow oxygen at 60 L and 95% FiO2 along with a nonrebreather mask with O2 

saturations in the mid 80s.  He did receive convalescent plasma and tocilizumab 

back on 04/03/2021.  He has a 0.9 normal saline at 75 mL per hour.  Yesterday's 

blood gases revealed a pO2 of 49, pCO2 of 30 and a pH of 7.5.  Chest x-ray 

revealing slight improvement of bibasilar infiltrates.  Blood cultures reveal no

growth.  White count 12.7.  Hemoglobin 15.1.  D-dimer 2.47.  Sodium 135.  

Potassium 4.3.  Creatinine 0.74.  LDH 1213.  C-reactive protein 8.6.  Continued 

on Lovenox, IV Solu-Medrol, vitamin supplements.





The patient is seen today 04/11/2021 in follow-up in the intensive care unit.  

He remains sitting up in bed.  Awake and alert.  He is continued on AirVo high 

flow oxygen at 60 L and 95% FiO2 along with a nonrebreather mask.  O2 

saturations in the 80s.  He is 0.9 normal saline at 75 ML's per hour.  He 

received convalescent plasma 1.  Blood cultures reveal no growth.  White count 

11.1.  Hemoglobin 15.3.  Lymphocytes 0.4.  Sodium 134.  Potassium 4.4.  

Creatinine 0.71.  LDH 1172.  C-reactive protein 7.5.  He remains on Lovenox, 

Solu-Medrol, vitamin supplements. 





Objective





- Vital Signs


Vital signs: 


                                   Vital Signs











Temp  98.3 F   04/11/21 09:00


 


Pulse  97   04/11/21 12:00


 


Resp  29 H  04/11/21 12:00


 


BP  126/87   04/11/21 11:00


 


Pulse Ox  82 L  04/11/21 11:00








                                 Intake & Output











 04/10/21 04/11/21 04/11/21





 18:59 06:59 18:59


 


Intake Total 900 900 650


 


Output Total 7344 543 4548


 


Balance -775 200 -700


 


Intake:   


 


   900 450


 


    Sodium Chloride 0.9% 1, 900 900 450





    000 ml @ 75 mls/hr IV .   





    [id number] [facility] Rx#:[id number]   


 


  Oral   200


 


Output:   


 


  Urine 8401 247 2859


 


Other:   


 


  Voiding Method  Urinal Urinal


 


  # Voids  1 


 


  # Bowel Movements   1














- Exam





GENERAL EXAM: Alert, pleasant 50-year-old gentleman, on AirVo at 60 L/m, 90% 

FiO2, plus a nonrebreather mask, comfortable in mild respiratory distress.


HEAD: Normocephalic.


EYES: Normal reaction of pupils, equal size.


NOSE: Clear with pink turbinates.


THROAT: No erythema or exudates.


NECK: No masses, no JVD.


CHEST: No chest wall deformity.


LUNGS: Equal air entry with basilar crackles.


CVS: S1 and S2 normal with no audible murmur, regular rhythm.


ABDOMEN: No hepatosplenomegaly, normal bowel sounds, no guarding or rigidity.


SPINE: No scoliosis or deformity


SKIN: No rashes


CENTRAL NERVOUS SYSTEM: No focal deficits, tone is normal in all 4 extremities.


EXTREMITIES: There is no peripheral edema.  No clubbing, no cyanosis.  

Peripheral pulses are intact.





- Labs


CBC & Chem 7: 


                                 04/11/21 03:58





                                 04/11/21 03:57


Labs: 


                  Abnormal Lab Results - Last 24 Hours (Table)











  04/11/21 04/11/21 04/11/21 Range/Units





  03:57 03:58 03:58 


 


WBC   11.1 H   (3.8-10.6)  k/uL


 


Neutrophils #   10.3 H   (1.3-7.7)  k/uL


 


Lymphocytes #   0.4 L   (1.0-4.8)  k/uL


 


Sodium  134 L    (137-145)  mmol/L


 


Carbon Dioxide  21 L    (22-30)  mmol/L


 


BUN  24 H    (9-20)  mg/dL


 


Glucose  119 H    (74-99)  mg/dL


 


Calcium  8.3 L    (8.4-10.2)  mg/dL


 


Total Bilirubin  1.4 H    (0.2-1.3)  mg/dL


 


ALT  96 H    (4-49)  U/L


 


Lactate Dehydrogenase  1172 H    (313-618)  U/L


 


CK-MB (CK-2)    2.6 H  (0.0-2.4)  ng/mL


 


Total Protein  5.6 L    (6.3-8.2)  g/dL


 


Albumin  2.8 L    (3.5-5.0)  g/dL














Assessment and Plan


Assessment: 





1 Acute hypoxic respiratory failure secondary to CoVID 19 pneumonia.  Outside 

the window for Remdesivir.  Did receive tocilizumab and 1 unit of convalescent 

plasma.  Remains on AirVo 60 L and 95% FiO2 along with a nonrebreather mask.  O2

saturations in the 80s.





2 History of chronic tobacco dependence





Plan: 





The patient was seen and evaluated by Dr. Perry


Chest x-ray and labs reviewed


Again spoke with the patient regarding possible intubation and  mechanical 

ventilatory support


He states he is currently doing okay and trying to hold off intubation


Encouraged again frequent position changes


Continue Lovenox, IV Solu-Medrol, vitamin supplements


We will continue to monitor him closely here in the ICU


We will continue to follow and make further recommendations based on his 

clinical status





Critical care time 35 minutes.





I, the cosigning physician, performed a history & physical examination of the 

patient. Lungs sounds with crackles in the bilateral posterior bases.  

Maintaining good O2 saturations in the 90s on AirVo at 60 L a minute with FiO2 

at 95%, plus a nonrebreather mask. I discussed the assessment and plan of care 

with my nurse practitioner, Africa Boudreaux. I attest to the above note as dictated 

by her.

## 2021-04-11 NOTE — P.PN
Subjective


Covering Dr. Rubi over the weekend


This is a pleasant 50 years old male with past medical history of GERD.  

Presents with respiratory symptoms and body ache and found to have acute 

bilateral covid pneumonia.  Currently patient is monitored closely in the ICU 

while he is using airvo with 60 L/m.  Still tachypneic and in respiratory 

distress.  Pulmonary/critical care team are monitoring him closely.


Chest x-ray showing bilateral infiltrate, improving


He has mild leukocytosis of 12 K.  D-dimer is elevated 2.4, on the edges-1213 

but C-reactive protein is normal at 8.6 today


He remains on Solu-Medrol 60 mg, normal saline at 75 mL per day.  With vitamin 

C, vitamin D, zinc and Lovenox.


He is a status post tocilizumab and convalescent plasma








Review of systems


CONSTITUTIONAL: No fever, no malaise, no fatigue. 


HEENT: No recent visual problems or hearing problems. Denied any sore throat. 


CARDIOVASCULAR: No  orthopnea, PND, no palpitations, no syncope. 


PULMONARY: No chest wall tenderness, no hemoptysis. 


GASTROINTESTINAL: No diarrhea, no nausea, no vomiting, no abdominal pain. 

Normoactive bowel sounds. 


NEUROLOGICAL: No headaches, no weakness, no numbness. 


                               Active Medications











Generic Name Dose Route Start Last Admin





  Trade Name Freq  PRN Reason Stop Dose Admin


 


Acetaminophen  650 mg  03/31/21 22:59  04/05/21 10:14





  Acetaminophen Tab 325 Mg Tab  PO   650 mg





  Q6HR PRN   Administration





  Mild Pain or Fever > 100.5  


 


Albuterol Sulfate  2 puff  03/31/21 22:19  04/10/21 20:14





  Albuterol Hfa Inhaler  INHALATION   2 puff





  RT-Q6H PRN   Administration





  Shortness Of Breath Or Wheezing  


 


Ascorbic Acid  500 mg  04/01/21 09:00  04/10/21 09:40





  Ascorbic Acid 500 Mg Tab  PO   500 mg





  DAILY SHAZIA   Administration


 


Cholecalciferol  50 mcg  04/01/21 09:00  04/10/21 09:40





  Cholecalciferol 25 Mcg (1000 Iu) Tablet  PO   50 mcg





  DAILY SHAZIA   Administration


 


Enoxaparin Sodium  40 mg  04/01/21 09:00  04/10/21 09:40





  Enoxaparin 40 Mg/0.4 Ml Syringe  SQ   40 mg





  DAILY SHAZIA   Administration


 


Sodium Chloride  1,000 mls @ 75 mls/hr  04/04/21 08:30  04/10/21 18:53





  Saline 0.9%  IV   75 mls/hr





  .[id number] SHAZIA   Administration


 


Methylprednisolone Sodium Succinate  60 mg  04/03/21 18:00  04/10/21 23:42





  Methylprednisolone Sod Succi 125 Mg/2 Ml Vial  IV   60 mg





  Q6HR SHAZIA   Administration


 


Naloxone HCl  0.2 mg  03/31/21 22:59 





  Naloxone 0.4 Mg/Ml 1 Ml Vial  IV  





  Q2M PRN  





  Opioid Reversal  


 


Ondansetron HCl  4 mg  03/31/21 22:59  04/06/21 11:38





  Ondansetron 4 Mg/2 Ml Vial  IVP   4 mg





  Q8HR PRN   Administration





  Nausea And Vomiting  


 


Pantoprazole Sodium  40 mg  04/02/21 11:00  04/10/21 09:40





  Pantoprazole 40 Mg/10 Ml Vial  IVP   40 mg





  DAILY SHAZIA   Administration


 


Quetiapine Fumarate  25 mg  04/10/21 14:17  04/10/21 23:42





  Quetiapine 25 Mg Tab  PO   25 mg





  BID PRN   Administration





  Anxiety  


 


Tamsulosin HCl  0.4 mg  04/01/21 09:00  04/10/21 09:40





  Tamsulosin 0.4 Mg Cap.Er.24h  PO   0.4 mg





  DAILY SHAZIA   Administration


 


Tramadol HCl  50 mg  04/02/21 10:44  04/10/21 17:11





  Tramadol 50 Mg Tab  PO   50 mg





  Q6H PRN   Administration





  Pain  


 


Zinc Sulfate  220 mg  04/01/21 09:00  04/10/21 09:40





  Zinc Sulfate 220 Mg Cap  PO   220 mg





  DAILY SHAZIA   Administration














Objective





- Vital Signs


Vital signs: 


                                   Vital Signs











Temp  98.7 F   04/10/21 07:00


 


Pulse  72   04/10/21 07:00


 


Resp  23   04/10/21 07:00


 


BP  130/83   04/10/21 07:00


 


Pulse Ox  87 L  04/10/21 07:10








                                 Intake & Output











 04/09/21 04/10/21 04/10/21





 18:59 06:59 18:59


 


Intake Total 2225 825 75


 


Output Total 780 675 


 


Balance 1445 150 75


 


Weight 112.3 kg 103 kg 


 


Intake:   


 


   825 75


 


    Sodium Chloride 0.9% 1, 825 825 75





    000 ml @ 75 mls/hr IV .   





    [id number] SHAZIA Rx#:[id number]   


 


  Oral 1400  


 


Output:   


 


  Urine 780 675 


 


Other:   


 


  Voiding Method Urinal Urinal 














- Exam





-GENERAL: The patient is alert and oriented x3, in mild respiratory distress, he

is on airvo


HEENT: Pupils are round and equally reacting to light. EOMI. No scleral icterus.

No conjunctival pallor. Normocephalic, atraumatic. No pharyngeal erythema. No 

thyromegaly. 


CARDIOVASCULAR: S1 and S2 present. No murmurs, rubs, or gallops. 


-PULMONARY: Chest is clear to auscultation, no wheezing or crackles.  Bilateral 

crepitation 


ABDOMEN: Soft, nontender, nondistended, normoactive bowel sounds. No palpable 

organomegaly. 


MUSCULOSKELETAL: No joint swelling or deformity. 


EXTREMITIES: No cyanosis, clubbing, or pedal edema. 


NEUROLOGICAL: Gross neurological examination did not reveal any focal deficits. 


SKIN: No rashes. no petechiae.





- Labs


CBC & Chem 7: 


                                 04/10/21 04:04





                                 04/10/21 04:04


Labs: 


                  Abnormal Lab Results - Last 24 Hours (Table)











  04/10/21 04/10/21 04/10/21 Range/Units





  04:04 04:04 04:04 


 


WBC  12.7 H    (3.8-10.6)  k/uL


 


Neutrophils #  11.5 H    (1.3-7.7)  k/uL


 


Lymphocytes #  0.4 L    (1.0-4.8)  k/uL


 


D-Dimer   2.47 H   (<0.60)  mg/L FEU


 


Sodium    135 L  (137-145)  mmol/L


 


BUN    27 H  (9-20)  mg/dL


 


Glucose    109 H  (74-99)  mg/dL


 


Total Bilirubin    1.4 H  (0.2-1.3)  mg/dL


 


ALT    111 H  (4-49)  U/L


 


Lactate Dehydrogenase    1213 H  (313-618)  U/L


 


Total Protein    5.6 L  (6.3-8.2)  g/dL


 


Albumin    2.8 L  (3.5-5.0)  g/dL














Assessment and Plan


Assessment: 





Bilateral Covid pneumonia


Acute hypoxic respiratory failure secondary to above


Increased inflammatory markers


History of GERD


Plan: 





This is a pleasant 50 years old male who presents with Covid pneumonia.  He 

remains in the ICU with close monitoring.  With pulmonary/critical care team.  

Currently he remains on high-dose of oxygen through airvo, continue with 

steroids, continue gentle hydration, vitamin C and D and zinc.  Continue with 

Lovenox.  Monitor inflammatory markers and chest x-ray


Labs and medication were reviewed..  Continue same treatment.  Continue with 

symptomatic treatment.  Resume home medication.  Monitor lytes and vitals.  DVT 

and GI prophylaxis.  Further recommendations as per clinical course of the [facility]


DVT prophylaxis: Subcutaneous Lovenox


GI Prophylaxis: ppi


Prognosis is guarded

## 2021-04-11 NOTE — XR
EXAMINATION TYPE: XR chest 1V portable

 

DATE OF EXAM: 4/11/2021

 

COMPARISON: 4/10/2021

 

INDICATION: Covid

 

TECHNIQUE: Single frontal view of the chest is obtained.

 

FINDINGS:  

The heart size is normal.  

The pulmonary vasculature is trauma.  

Mild diffuse increased lung markings are present greater in the lung bases. Findings are worsening ov
er the interval. Air bronchograms are developing at the left base.  

 

 

IMPRESSION:  

1. Worsening bibasilar infiltrates.

## 2021-04-12 LAB
ALBUMIN SERPL-MCNC: 2.7 G/DL (ref 3.5–5)
ALP SERPL-CCNC: 68 U/L (ref 38–126)
ALT SERPL-CCNC: 86 U/L (ref 4–49)
ANION GAP SERPL CALC-SCNC: 8 MMOL/L
AST SERPL-CCNC: 33 U/L (ref 17–59)
BUN SERPL-SCNC: 25 MG/DL (ref 9–20)
CALCIUM SPEC-MCNC: 8.3 MG/DL (ref 8.4–10.2)
CHLORIDE SERPL-SCNC: 106 MMOL/L (ref 98–107)
CO2 SERPL-SCNC: 21 MMOL/L (ref 22–30)
ERYTHROCYTE [DISTWIDTH] IN BLOOD BY AUTOMATED COUNT: 5.08 M/UL (ref 4.3–5.9)
ERYTHROCYTE [DISTWIDTH] IN BLOOD: 12.4 % (ref 11.5–15.5)
FERRITIN SERPL-MCNC: 952.1 NG/ML (ref 22–322)
GLUCOSE SERPL-MCNC: 115 MG/DL (ref 74–99)
HCT VFR BLD AUTO: 42.5 % (ref 39–53)
HGB BLD-MCNC: 15.4 GM/DL (ref 13–17.5)
LDH SPEC-CCNC: 1157 U/L (ref 313–618)
MCH RBC QN AUTO: 30.3 PG (ref 25–35)
MCHC RBC AUTO-ENTMCNC: 36.3 G/DL (ref 31–37)
MCV RBC AUTO: 83.6 FL (ref 80–100)
PLATELET # BLD AUTO: 260 K/UL (ref 150–450)
POTASSIUM SERPL-SCNC: 4.6 MMOL/L (ref 3.5–5.1)
PROT SERPL-MCNC: 5.3 G/DL (ref 6.3–8.2)
SODIUM SERPL-SCNC: 135 MMOL/L (ref 137–145)
WBC # BLD AUTO: 19 K/UL (ref 3.8–10.6)

## 2021-04-12 PROCEDURE — 05HF33Z INSERTION OF INFUSION DEVICE INTO LEFT CEPHALIC VEIN, PERCUTANEOUS APPROACH: ICD-10-PCS

## 2021-04-12 RX ADMIN — ALBUTEROL SULFATE PRN PUFF: 90 AEROSOL, METERED RESPIRATORY (INHALATION) at 11:37

## 2021-04-12 RX ADMIN — PANTOPRAZOLE SODIUM SCH MG: 40 INJECTION, POWDER, FOR SOLUTION INTRAVENOUS at 08:02

## 2021-04-12 RX ADMIN — DEXMEDETOMIDINE HYDROCHLORIDE SCH MLS/HR: 4 INJECTION, SOLUTION INTRAVENOUS at 20:15

## 2021-04-12 RX ADMIN — CEFAZOLIN SCH: 330 INJECTION, POWDER, FOR SOLUTION INTRAMUSCULAR; INTRAVENOUS at 16:45

## 2021-04-12 RX ADMIN — ENOXAPARIN SODIUM SCH MG: 40 INJECTION SUBCUTANEOUS at 08:02

## 2021-04-12 RX ADMIN — OXYCODONE HYDROCHLORIDE AND ACETAMINOPHEN SCH MG: 500 TABLET ORAL at 08:02

## 2021-04-12 RX ADMIN — TAMSULOSIN HYDROCHLORIDE SCH MG: 0.4 CAPSULE ORAL at 08:02

## 2021-04-12 RX ADMIN — METHYLPREDNISOLONE SODIUM SUCCINATE SCH MG: 125 INJECTION, POWDER, FOR SOLUTION INTRAMUSCULAR; INTRAVENOUS at 00:01

## 2021-04-12 RX ADMIN — Medication SCH: at 08:02

## 2021-04-12 RX ADMIN — METHYLPREDNISOLONE SODIUM SUCCINATE SCH MG: 125 INJECTION, POWDER, FOR SOLUTION INTRAMUSCULAR; INTRAVENOUS at 18:22

## 2021-04-12 RX ADMIN — METHYLPREDNISOLONE SODIUM SUCCINATE SCH MG: 125 INJECTION, POWDER, FOR SOLUTION INTRAMUSCULAR; INTRAVENOUS at 05:19

## 2021-04-12 RX ADMIN — Medication SCH MG: at 08:02

## 2021-04-12 RX ADMIN — METHYLPREDNISOLONE SODIUM SUCCINATE SCH MG: 125 INJECTION, POWDER, FOR SOLUTION INTRAMUSCULAR; INTRAVENOUS at 12:25

## 2021-04-12 RX ADMIN — METHYLPREDNISOLONE SODIUM SUCCINATE SCH MG: 125 INJECTION, POWDER, FOR SOLUTION INTRAMUSCULAR; INTRAVENOUS at 23:01

## 2021-04-12 NOTE — P.PN
Subjective


Progress Note Date: 04/12/21








CoVID 19 pneumonia





50-year-old male, with history of shortness of breath, who presents to the 

emergency department on March 31, a little after 9:00 PM.  He apparently was 

brought in by EMS.  I saw the patient in the emergency room, in room 33.  He was

on O2 several liters by nasal cannula and not receiving any IV fluids.  He has 

been sick for about 11 or 12 days.  He apparently just tested positive today.  

His symptoms included shortness of breath, weakness, fatigue, low saturations, 

and fever.  As an outpatient, he was on Tylenol, a Z-Alex, Claritin, Zofran, 

Flomax, and a Medrol Dosepak.  He does have ALLERGIES to penicillin antibiotics,

and sulfa antibiotics.  The patient was quite fatigued when I saw him in the 

emergency room.  He could barely open his eyes.  He was not demonstrating any 

signs or symptoms of respiratory compromise, and did not have any conversational

dyspnea or accessory muscle use.  His white count was 4.2, hemoglobin 16, 

hematocrit 46.2, and platelet count 201,000.  PT/INR PTT were normal.  Sodium 

136, potassium 4.4, chlorides 104, CO2 24, anion gap 8, BUN 17, and creatinine 

0.94.  AST was 96, FiO2 104, LDH was 723, and C-reactive protein was 133.9.  

There was no d-dimer ordered.  A chest x-ray did reveal bilateral infiltrates.





The patient is seen today 04/02/2021 in follow-up on the regular medical floor. 

He is currently awake, alert, in no acute distress.  He is still requiring AirVo

high flow nasal cannula at 60 L and 90% FiO2 to maintain O2 saturations in the 

low 90s.  He is currently afebrile.  Hemodynamically stable.  Blood cultures 

reveal no growth.  He remains on Lovenox, Decadron, vitamin supplements. 





The patient is seen today 04/03/2021 in follow-up on the regular medical floor. 

He is currently resting on his right side in bed.  Still quite short of breath 

with minimal exertion.  Still requiring AirVo high flow oxygen at 60 L and 90% 

FiO2 to maintain O2 saturations in the 90s.  Chest x-ray continues to show 

persistent interstitial opacities improving and the left but now worsening on 

the right mid and lower lungs.  D-dimer 0.91.  .  C-reactive protein 2O2.

 He remains on dexamethasone, Lovenox, vitamin supplements.





The patient is seen today 04/04/2021 in follow-up in the intensive care unit.  

He was transferred here last evening after developing increasing shortness of 

breath and increasing oxygen requirements.  He is currently resting fairly 

comfortably in bed.  He remains on BiPAP 14/7 and 100% FiO2 to maintain O2 

saturations in the low 90s.  Chest x-ray continues to show persistent right 

greater than left diffuse interstitial opacities with interval decrease in the 

right lung base and mild increase on the left.  He did receive Tocilizumab and 

convalescent plasma yesterday.  He remains on Lovenox, IV Solu-Medrol, vitamin 

supplements.  White count 10.1.  Hemoglobin 15.5.  Lymphocytes 0.4.  D-dimer 

1.27.  Sodium 140.  Potassium 4.6.  Creatinine 0.82.  LDH 10,025.  C-reactive 

protein 171.





Progress note dated 04/05/2021.





The patient was admitted to the hospital on March 31.  He came to the ICU on 

April 3.  He is currently unfortunately on BiPAP at 14/7, and 100%.  He is 

getting saline at 75 mL an hour.  He is quite short of breath.  The patient did 

receive both convalescent plasma, and TOCI on April 3.  The patient doesn't feel

like he is getting better.  He doesn't feel any worse but just doesn't feel like

he is improving.  He does realize, that in the end, he may end up on the 

mechanical ventilator.  White count 14.7, hemoglobin 15.7, hematocrit 46.0, 

platelet count 321,000.  D-dimer is 1.45, and fibrinogen is 673.  Sodium 143, 

potassium 4.3, chlorides 109, CO2 26, anion gap 8, BUN 41, and creatinine 0.94. 

Most recent LDH is 1025.  The most recent C-reactive protein is 64.1.  Chest x-

ray continues to show bilateral diffuse interstitial infiltrates.





Progress note dated 04/06/2021.





50-year-old male, admitted to the hospital on March 31.  He came in to the ICU 

on April 3.  Currently, the patient's on BiPAP with IPAP of 14, EPAP of 7.  FiO2

is 100%.  When he is not on BiPAP, the patient's on 15 L high flow nasal O2.  In

addition, when he is on the high flow nasal oxygen, he also has a nonrebreather 

mask on as well.  The patient's getting saline at 75 mL an hour.  White count 

14, hemoglobin 14.7, hematocrit 43.7, and platelet count 328,000.  D-dimer 1.76.

 Sodium 142, potassium 4.8, chlorides 112, CO2 26, anion gap 4, BUN and 

creatinine were 39 and 0.91.  C-reactive protein is down to 29.6.  Chest x-ray 

shows diffuse bilateral infiltrates.





The patient is seen today 04/07/2021 in follow-up in the intensive care unit.  

He is currently sitting up in bed.  Awake and alert.  Currently on 15 L high 

flow nasal cannula along with 100% nonrebreather mask.  He did not utilize the 

BiPAP last evening.  He has 0.9 normal saline at 75 ML's per hour.  Still 

dyspneic with minimal exertion.  Chest x-ray continues to show diffuse mixed 

interstitial and alveolar infiltrates.  Stable compared to previous.  Blood 

culture reveals no growth.  White count 13.6.  Hemoglobin 14.8.  Lymphocytes 

0.4.  Sodium 142.  Potassium 4.2.  Creatinine 0.86.  LDH 1127, C-reactive 

protein 17.6.  He remains on bronchodilators, IV Solu-Medrol, Lovenox, vitamin 

supplements.





The patient is seen today 04/08/2021 in follow-up in the intensive care unit.  

He is currently resting fairly comfortably in bed.  No significant events 

overnight.  He is maintaining O2 saturations in the upper 80s and low 90s on 15 

L high flow nasal cannula and addition to a nonrebreather mask.  0.9 normal 

saline at 75 mL per hour.  Chest x-ray shows no changes and bilateral 

infiltrates.  White count 13.3.  Hemoglobin 15.3.  Lymphocytes 0.4.  D-dimer 

3.36.  Sodium 138.  Potassium 4.3.  Creatinine 0.72.  C-reactive protein 14.4.  

He remains on Lovenox, IV Solu-Medrol, vitamin supplements.





The patient is seen today 04/09/2021 follow-up in the intensive care unit.  He 

is currently sitting up in bed.  Awake and alert.  In mild respiratory distress.

 Still quite dyspneic with minimal exertion.  Still with oxygen desaturations 

with minimal exertion.  He is currently on airflow high flow oxygen at 60 L/m 

and 90% FiO2 along with a nonrebreather mask to maintain O2 saturations 85-86%. 

Morning blood gases revealed a PaO2 of 49, pCO2 30 and a pH of 7.48.  0.9 normal

sinus 75 ML's per hour.  He has received a unit of convalescent plasma.  Blood 

cultures reveal no growth.  White count 12.2.  Hemoglobin 15.2.  Lymphocytes 

0.3.  D-dimer 2.84.  Sodium 138.  Potassium 4.3.  Creatinine 0.69.  LDH 1196.  

C-reactive protein 10.4.  Remains on IV Solu-Medrol, rhonchi dilators, Lovenox, 

vitamin supplements.





The patient is seen today 04/10/2021 follow-up in the intensive care unit.  He 

is currently sitting up in bed.  Awake and alert in no acute distress.  Still 

quite dyspneic with minimal exertion in conversation.  He remains on AirVo high 

flow oxygen at 60 L and 95% FiO2 along with a nonrebreather mask with O2 

saturations in the mid 80s.  He did receive convalescent plasma and tocilizumab 

back on 04/03/2021.  He has a 0.9 normal saline at 75 mL per hour.  Yesterday's 

blood gases revealed a pO2 of 49, pCO2 of 30 and a pH of 7.5.  Chest x-ray 

revealing slight improvement of bibasilar infiltrates.  Blood cultures reveal no

growth.  White count 12.7.  Hemoglobin 15.1.  D-dimer 2.47.  Sodium 135.  

Potassium 4.3.  Creatinine 0.74.  LDH 1213.  C-reactive protein 8.6.  Continued 

on Lovenox, IV Solu-Medrol, vitamin supplements.





The patient is seen today 04/11/2021 in follow-up in the intensive care unit.  

He remains sitting up in bed.  Awake and alert.  He is continued on AirVo high 

flow oxygen at 60 L and 95% FiO2 along with a nonrebreather mask.  O2 

saturations in the 80s.  He is 0.9 normal saline at 75 ML's per hour.  He 

received convalescent plasma 1.  Blood cultures reveal no growth.  White count 

11.1.  Hemoglobin 15.3.  Lymphocytes 0.4.  Sodium 134.  Potassium 4.4.  

Creatinine 0.71.  LDH 1172.  C-reactive protein 7.5.  He remains on Lovenox, 

Solu-Medrol, vitamin supplements. 





On 04/12/2021, I'm seeing this patient in follow-up.  The patient was Hospital 

as forCOVID 19  pneumonia and the patient progressive hypoxic respiratory 

failure.  The patient was initially admitted on 04/04/2021 and the patient is 

currently in the intensive care unit requiring high flow oxygen.  He is 

currently on high flow oxygen at 60 L with an FiO2 of 95% in addition to a 

nonrebreather facemask.  He is awake and alert.  He is short of breath with 

limited amount of activity and he easily desaturates.  No significant chest 

pain.  Cough with deep breathing.  The patient has already received convalescent

plasma.  The patient is currently on IV Solu-Medrol 60 mg every 6 hours of this 

in addition to multivitamins. He was also treated with Tocilizumab.  LDH level 

from yesterday was 1157 and the CRP was 8.1.  Chest x-ray still unchanged with 

diffuse bilateral pulmonary infiltrates.  This patient otherwise has been in 

good state of health.  No chronic illnesses otherwise.  He is being monitored 

very closely here in the intensive care unit due to concerns of progressive 

respiratory failure requiring intubation mechanical ventilation.  Blood cultures

of been negative thus far.





Objective





- Vital Signs


Vital signs: 


                                   Vital Signs











Temp  98.9 F   04/12/21 04:00


 


Pulse  89   04/12/21 07:00


 


Resp  38 H  04/12/21 07:00


 


BP  146/78   04/12/21 07:00


 


Pulse Ox  80 L  04/12/21 07:00








                                 Intake & Output











 04/11/21 04/12/21 04/12/21





 18:59 06:59 18:59


 


Intake Total 1025 825 75


 


Output Total 1350 500 600


 


Balance -325 325 -525


 


Weight  103.9 kg 


 


Intake:   


 


   825 75


 


    Sodium Chloride 0.9% 1, 825 825 75





    000 ml @ 75 mls/hr IV .   





    [id number] [facility] Rx#:[id number]   


 


  Oral 200  


 


Output:   


 


  Urine 1350 500 600


 


Other:   


 


  Voiding Method Urinal Urinal 


 


  # Voids  1 1


 


  # Bowel Movements 1  














- Exam








GENERAL EXAM: Alert, pleasant 50-year-old gentleman, on AirVo at 60 L/m, 90% 

FiO2, plus a nonrebreather mask, comfortable in mild respiratory distress.


HEAD: Normocephalic.


EYES: Normal reaction of pupils, equal size.


NOSE: Clear with pink turbinates.


THROAT: No erythema or exudates.


NECK: No masses, no JVD.


CHEST: No chest wall deformity.


LUNGS: Equal air entry with basilar crackles.


CVS: S1 and S2 normal with no audible murmur, regular rhythm.


ABDOMEN: No hepatosplenomegaly, normal bowel sounds, no guarding or rigidity.


SPINE: No scoliosis or deformity


SKIN: No rashes


CENTRAL NERVOUS SYSTEM: No focal deficits, tone is normal in all 4 extremities.


EXTREMITIES: There is no peripheral edema.  No clubbing, no cyanosis.  

Peripheral pulses are intact.





- Labs


CBC & Chem 7: 


                                 04/12/21 03:41





                                 04/12/21 03:41


Labs: 


                  Abnormal Lab Results - Last 24 Hours (Table)











  04/11/21 04/12/21 04/12/21 Range/Units





  03:57 03:41 03:41 


 


WBC     (3.8-10.6)  k/uL


 


D-Dimer   2.09 H   (<0.60)  mg/L FEU


 


Sodium    135 L  (137-145)  mmol/L


 


Carbon Dioxide    21 L  (22-30)  mmol/L


 


BUN    25 H  (9-20)  mg/dL


 


Glucose    115 H  (74-99)  mg/dL


 


Calcium    8.3 L  (8.4-10.2)  mg/dL


 


Ferritin  1239.3 H    (22.0-322.0)  ng/mL


 


ALT    86 H  (4-49)  U/L


 


Lactate Dehydrogenase    1157 H  (313-618)  U/L


 


CK-MB (CK-2)     (0.0-2.4)  ng/mL


 


Total Protein    5.3 L  (6.3-8.2)  g/dL


 


Albumin    2.7 L  (3.5-5.0)  g/dL














  04/12/21 04/12/21 Range/Units





  03:41 03:41 


 


WBC  19.0 H   (3.8-10.6)  k/uL


 


D-Dimer    (<0.60)  mg/L FEU


 


Sodium    (137-145)  mmol/L


 


Carbon Dioxide    (22-30)  mmol/L


 


BUN    (9-20)  mg/dL


 


Glucose    (74-99)  mg/dL


 


Calcium    (8.4-10.2)  mg/dL


 


Ferritin    (22.0-322.0)  ng/mL


 


ALT    (4-49)  U/L


 


Lactate Dehydrogenase    (313-618)  U/L


 


CK-MB (CK-2)   3.3 H  (0.0-2.4)  ng/mL


 


Total Protein    (6.3-8.2)  g/dL


 


Albumin    (3.5-5.0)  g/dL














Assessment and Plan


Plan: 








1 Acute hypoxic respiratory failure secondary to CoVID 19 pneumonia.  Outside 

the window for Remdesivir.  Did receive tocilizumab and 1 unit of convalescent 

plasma.  Remains on AirVo 60 L and 95% FiO2 along with a nonrebreather mask.  O2

saturations in the 80s.  The chest x-ray showing small lung volumes.  

Infiltrates are bilaterally more so in the lung bases with a peripheral 

distribution.  The patient is a high risk of progression and requiring 

intubation mechanical ventilation for that reason the patient is being monitored

in the intensive care unit.  The patient desaturates with limited amount of 

activity.  He has done very slow progress over this past 1 week and the patient 

has been hospitalized since March 31.





2 History of chronic tobacco dependence





3 elevated inflammatory markers secondary to Covid 19 infection





4 leukocytosis





Plan: 


 





Encouraged again frequent position changes, including prone positioning


Midline catheter insertion today


Continue IV Solu-Medrol


Continue Lovenox prophylactic dose of 40 mg subcu every 24 hours a d-dimer is at

2.09


Moderate inflammatory markers


Adequate monitoring of his oxygenation on high flow which is currently at 60 L 

with an FiO2 of less than 95%


Check pro calcitonin level





Critical care time >30 minutes.


Time with Patient: Greater than 30

## 2021-04-12 NOTE — P.PN
Subjective


Covering Dr. Rubi over the weekend


This is a pleasant 50 years old male with past medical history of GERD.  

Presents with respiratory symptoms and body ache and found to have acute 

bilateral covid pneumonia.  Currently patient is monitored closely in the ICU 

while he is using airvo with 60 L/m.  Still tachypneic and in respiratory 

distress.  Pulmonary/critical care team are monitoring him closely.


Chest x-ray showing bilateral infiltrate, improving


He has mild leukocytosis of 12 K.  D-dimer is elevated 2.4, on the edges-1213 

but C-reactive protein is normal at 8.6 today


He remains on Solu-Medrol 60 mg, normal saline at 75 mL per day.  With vitamin 

C, vitamin D, zinc and Lovenox.


He is a status post tocilizumab and convalescent plasma





04/11/2021


Patient remains in the ICU on high flow nasal cannula at 60 L/m, looks his 

stable.  No other complaints and no diarrhea or chest pain.


His WBC is 11.1 K, LDH is same as 1127 and C-reactive protein is slightly 

improved to 7.5 which is within the reference range.  However patient to Make 

still tachypneic, febrile.


Patient to be kept in the ICU with close monitoring as he is at-risk of [facility]

bation


Patient remains on Solu-Medrol 60 mg, normal saline 75 mg, multiple vitamins





Review of systems


CONSTITUTIONAL: No fever, no malaise, no fatigue. 


HEENT: No recent visual problems or hearing problems. Denied any sore throat. 


CARDIOVASCULAR: No  orthopnea, PND, no palpitations, no syncope. 


PULMONARY: No chest wall tenderness, no hemoptysis. 


GASTROINTESTINAL: No diarrhea, no nausea, no vomiting, no abdominal pain. 

Normoactive bowel sounds. 


NEUROLOGICAL: No headaches, no weakness, no numbness. 


                               Active Medications











Generic Name Dose Route Start Last Admin





  Trade Name Freq  PRN Reason Stop Dose Admin


 


Acetaminophen  650 mg  03/31/21 22:59  04/05/21 10:14





  Acetaminophen Tab 325 Mg Tab  PO   650 mg





  Q6HR PRN   Administration





  Mild Pain or Fever > 100.5  


 


Albuterol Sulfate  2 puff  03/31/21 22:19  04/11/21 09:34





  Albuterol Hfa Inhaler  INHALATION   2 puff





  RT-Q6H PRN   Administration





  Shortness Of Breath Or Wheezing  


 


Ascorbic Acid  500 mg  04/01/21 09:00  04/11/21 10:10





  Ascorbic Acid 500 Mg Tab  PO   500 mg





  DAILY SHAZIA   Administration


 


Cholecalciferol  50 mcg  04/01/21 09:00  04/11/21 12:11





  Cholecalciferol 25 Mcg (1000 Iu) Tablet  PO   Not Given





  DAILY SHAZIA  


 


Enoxaparin Sodium  40 mg  04/01/21 09:00  04/11/21 10:09





  Enoxaparin 40 Mg/0.4 Ml Syringe  SQ   40 mg





  DAILY SHAZIA   Administration


 


Sodium Chloride  1,000 mls @ 75 mls/hr  04/04/21 08:30  04/11/21 21:07





  Saline 0.9%  IV   75 mls/hr





  .[id number] SHAZIA   Administration


 


Methylprednisolone Sodium Succinate  60 mg  04/03/21 18:00  04/12/21 00:01





  Methylprednisolone Sod Succi 125 Mg/2 Ml Vial  IV   60 mg





  Q6HR SHAZIA   Administration


 


Naloxone HCl  0.2 mg  03/31/21 22:59 





  Naloxone 0.4 Mg/Ml 1 Ml Vial  IV  





  Q2M PRN  





  Opioid Reversal  


 


Ondansetron HCl  4 mg  03/31/21 22:59  04/06/21 11:38





  Ondansetron 4 Mg/2 Ml Vial  IVP   4 mg





  Q8HR PRN   Administration





  Nausea And Vomiting  


 


Pantoprazole Sodium  40 mg  04/02/21 11:00  04/11/21 10:09





  Pantoprazole 40 Mg/10 Ml Vial  IVP   40 mg





  DAILY SHAZIA   Administration


 


Quetiapine Fumarate  25 mg  04/10/21 14:17  04/11/21 21:06





  Quetiapine 25 Mg Tab  PO   25 mg





  BID PRN   Administration





  Anxiety  


 


Tamsulosin HCl  0.4 mg  04/01/21 09:00  04/11/21 10:09





  Tamsulosin 0.4 Mg Cap.Er.24h  PO   0.4 mg





  DAILY SHAZIA   Administration


 


Tramadol HCl  50 mg  04/02/21 10:44  04/11/21 16:01





  Tramadol 50 Mg Tab  PO   50 mg





  Q6H PRN   Administration





  Pain  


 


Zinc Sulfate  220 mg  04/01/21 09:00  04/11/21 10:10





  Zinc Sulfate 220 Mg Cap  PO   220 mg





  DAILY SHAZIA   Administration














Objective





- Vital Signs


Vital signs: 


                                   Vital Signs











Temp  98.3 F   04/11/21 09:00


 


Pulse  97   04/11/21 12:00


 


Resp  29 H  04/11/21 12:00


 


BP  126/87   04/11/21 11:00


 


Pulse Ox  82 L  04/11/21 11:00








                                 Intake & Output











 04/10/21 04/11/21 04/11/21





 18:59 06:59 18:59


 


Intake Total 900 900 650


 


Output Total 6455 081 5754


 


Balance -775 200 -700


 


Intake:   


 


   900 450


 


    Sodium Chloride 0.9% 1, 900 900 450





    000 ml @ 75 mls/hr IV .   





    [id number] SHAZIA Rx#:[id number]   


 


  Oral   200


 


Output:   


 


  Urine 3186 230 1464


 


Other:   


 


  Voiding Method  Urinal Urinal


 


  # Voids  1 


 


  # Bowel Movements   1














- Exam





-GENERAL: The patient is alert and oriented x3, in mild respiratory distress, he

is on airvo


HEENT: Pupils are round and equally reacting to light. EOMI. No scleral icterus.

No conjunctival pallor. Normocephalic, atraumatic. No pharyngeal erythema. No 

thyromegaly. 


CARDIOVASCULAR: S1 and S2 present. No murmurs, rubs, or gallops. 


-PULMONARY: Chest is clear to auscultation, no wheezing or crackles.  Bilateral 

crepitation 


ABDOMEN: Soft, nontender, nondistended, normoactive bowel sounds. No palpable 

organomegaly. 


MUSCULOSKELETAL: No joint swelling or deformity. 


EXTREMITIES: No cyanosis, clubbing, or pedal edema. 


NEUROLOGICAL: Gross neurological examination did not reveal any focal deficits. 


SKIN: No rashes. no petechiae.





- Labs


CBC & Chem 7: 


                                 04/11/21 03:58





                                 04/11/21 03:57


Labs: 


                  Abnormal Lab Results - Last 24 Hours (Table)











  04/11/21 04/11/21 04/11/21 Range/Units





  03:57 03:58 03:58 


 


WBC   11.1 H   (3.8-10.6)  k/uL


 


Neutrophils #   10.3 H   (1.3-7.7)  k/uL


 


Lymphocytes #   0.4 L   (1.0-4.8)  k/uL


 


Sodium  134 L    (137-145)  mmol/L


 


Carbon Dioxide  21 L    (22-30)  mmol/L


 


BUN  24 H    (9-20)  mg/dL


 


Glucose  119 H    (74-99)  mg/dL


 


Calcium  8.3 L    (8.4-10.2)  mg/dL


 


Total Bilirubin  1.4 H    (0.2-1.3)  mg/dL


 


ALT  96 H    (4-49)  U/L


 


Lactate Dehydrogenase  1172 H    (313-618)  U/L


 


CK-MB (CK-2)    2.6 H  (0.0-2.4)  ng/mL


 


Total Protein  5.6 L    (6.3-8.2)  g/dL


 


Albumin  2.8 L    (3.5-5.0)  g/dL














Assessment and Plan


Assessment: 





Bilateral Covid pneumonia


Acute hypoxic respiratory failure secondary to above


Increased inflammatory markers


History of GERD


Plan: 





This is a pleasant 50 years old male who presents with Covid pneumonia.  He 

remains in the ICU with close monitoring.  With pulmonary/critical care team.  

Currently he remains on high-dose of oxygen through airvo, continue with 

steroids, continue gentle hydration, vitamin C and D and zinc.  Continue with 

Lovenox.  Monitor inflammatory markers and chest x-ray


Labs and medication were reviewed..  Continue same treatment.  Continue with 

symptomatic treatment.  Resume home medication.  Monitor lytes and vitals.  DVT 

and GI prophylaxis.  Further recommendations as per clinical course of the 

patient


DVT prophylaxis: Subcutaneous Lovenox


GI Prophylaxis: ppi


Prognosis is guarded

## 2021-04-12 NOTE — XR
EXAMINATION TYPE: XR chest 1V portable

 

DATE OF EXAM: 4/12/2021

 

COMPARISON: 4/11/2021

 

HISTORY: Cough

 

TECHNIQUE: Single frontal view of the chest is obtained.

 

FINDINGS:  Bilateral lower lobe infiltrate stable small effusion. No pneumothorax. Heart is prominent
. Hypertrophic change of the spine. Mild interstitial prominence.

 

IMPRESSION:  Bilateral infiltrate stable.

## 2021-04-12 NOTE — P.PN
Subjective


Progress Note Date: 04/12/21


Malcom Griffith is a 51 yo M with PMH of allergies who presented to the ED via EMS

with worsening malaise and shortness of breath. He states he developed a cough 

and sore throat about 2 weeks ago which has worsened since then. He complains of

fever chills and shortness of breath. On arrival he was febrile tachypenic and 

hypoxic SpO2 94% on 4 L O2. WBC 5.8, lymphopenia, , , COVID 

positive, CXR with coarse bilateral infiltrates.





[id number] maintained on 90% airflow, O2 sats in the low 90s.  Positive 

nonproductive cough .  Outside window for Remdesevir, continue on steroids, 

vitamins,lovenox. Chest pain, palpitations, complains of chronic back pain.  T-

max 100.2.  Preliminary blood cultures reporting no growth at 24 hours





04/05/2021 status post convalescent plasma, TOCI, maintaining O2 sats in the 90s

on 100% BiPAP.  Chest x-ray reporting stable diffuse bilateral infiltrates.  

Afebrile, T-max 99.2, WBC 14.7.  Hemoglobin 15.7, platelets 321.  BUN 41, 

creatinine 0.94. D-dimer 1.45, ferritin 1511.4, LDH 1025, CRP 54.1.





04/06/2021 alternating between BiPAP and 15 L high flow nasal cannula combined 

with nonrebreather, maintaining O2 sats in the high 80s.  Chest x-ray reporting 

diffuse bilateral infiltrates stable. T-max 99,WBC 14.  Hemoglobin 14.7, 

platelets 328 D-dimer 1.76, ferritin and CRP decreased.  BUN 39, creatinine 

0.91.








04/07/2021 maintained on 15 L-nasal cannula with  nonrebreather mask, IV 

steroids, bronchodilators,maintaining O2 sats in the high 80s.  Did not Require 

BiPAP during the night.  Feels better.  Nonproductive cough.  Complains of mild 

chest tightness with coughing. Chest x-ray reporting stable diffuse bilateral 

infiltrate.  Afebrile, WBC 13.6.  Hemoglobin 14.8, platelets 300. BUN 33, 

creatinine 0.86.  Blood sugars controlled. LDH elevated, 1127, CRP down to 17.6.










04/08/2021  continue on IV steroids, vitamins, maintaining O2 sats in the mid to

high 80s on 15 L nasal cannula plus nonrebreather.  Nonproductive cough.  

Reports she feels better.  Chest x-ray reporting no change in bilateral 

infiltrates.  Afebrile.  Creatinine 0.72.





04/09/2021 Continues on airflow high flow oxygen at 60 L/m and 90% FiO2 along 

with a nonrebreather mask, maintaining O2 sats in the high 80s.   Minimal cough.

 Complains of dry mouth.  He reports, continues to feel better.  Afebrile, WBC 

down to 12.2.  D-dimer, CRP decreased.  LDH increased ,1196.





04/12/2021 maintained on high flow nasal cannula 60 L in addition to 

nonrebreather maintaining better O2 sats  in the high 90s to 100%.  Chest x-ray 

reporting stable bilateral infiltrates .complains of exertional shortness of 

breath. Afebrile, T-max 99.5, WBC 19.  D-dimer, ferritin, LDH  decreased, CRP 

8.1.  BUN 25, creatinine 0.75.  Scheduled to have midline placed today





Objective





- Vital Signs


Vital signs: 


                                   Vital Signs











Temp  98.4 F   04/12/21 12:00


 


Pulse  91   04/12/21 13:00


 


Resp  29 H  04/12/21 13:00


 


BP  134/85   04/12/21 13:00


 


Pulse Ox  83 L  04/12/21 13:00








                                 Intake & Output











 04/11/21 04/12/21 04/12/21





 18:59 06:59 18:59


 


Intake Total 1025 825 250


 


Output Total 1350 500 600


 


Balance -325 325 -350


 


Weight  103.9 kg 


 


Intake:   


 


   825 250


 


    Sodium Chloride 0.9% 1, 825 825 250





    000 ml @ 20 mls/hr IV .   





    Q24H [facility] Rx#:[id number]   


 


  Oral 200  


 


Output:   


 


  Urine 1350 500 600


 


Other:   


 


  Voiding Method Urinal Urinal 


 


  # Voids  1 1


 


  # Bowel Movements 1  














- Exam





General: Sitting up in bed, wearing high flow nasal cannula/NRB mask


Eyes: PERRL, EOMI, conjunctiva normal


HENT: normocephalic,


Lungs: Bilateral bases diminished, bilateral rales


CV: Regular rate and rhythm, no murmur. Peripheral pulses 2+


Abdomen: soft, nondistended, no organomegy, positive bowel sounds


Skin: warm and dry.


Neuro: A&Ox3, normal mood and affect








- Labs


CBC & Chem 7: 


                                 04/12/21 03:41





                                 04/12/21 03:41


Labs: 


                  Abnormal Lab Results - Last 24 Hours (Table)











  04/11/21 04/12/21 04/12/21 Range/Units





  03:57 03:41 03:41 


 


WBC     (3.8-10.6)  k/uL


 


D-Dimer   2.09 H   (<0.60)  mg/L FEU


 


Sodium    135 L  (137-145)  mmol/L


 


Carbon Dioxide    21 L  (22-30)  mmol/L


 


BUN    25 H  (9-20)  mg/dL


 


Glucose    115 H  (74-99)  mg/dL


 


Calcium    8.3 L  (8.4-10.2)  mg/dL


 


Ferritin  1239.3 H   952.1 H  (22.0-322.0)  ng/mL


 


ALT    86 H  (4-49)  U/L


 


Lactate Dehydrogenase    1157 H  (313-618)  U/L


 


CK-MB (CK-2)     (0.0-2.4)  ng/mL


 


Total Protein    5.3 L  (6.3-8.2)  g/dL


 


Albumin    2.7 L  (3.5-5.0)  g/dL














  04/12/21 04/12/21 Range/Units





  03:41 03:41 


 


WBC  19.0 H   (3.8-10.6)  k/uL


 


D-Dimer    (<0.60)  mg/L FEU


 


Sodium    (137-145)  mmol/L


 


Carbon Dioxide    (22-30)  mmol/L


 


BUN    (9-20)  mg/dL


 


Glucose    (74-99)  mg/dL


 


Calcium    (8.4-10.2)  mg/dL


 


Ferritin    (22.0-322.0)  ng/mL


 


ALT    (4-49)  U/L


 


Lactate Dehydrogenase    (313-618)  U/L


 


CK-MB (CK-2)   3.3 H  (0.0-2.4)  ng/mL


 


Total Protein    (6.3-8.2)  g/dL


 


Albumin    (3.5-5.0)  g/dL














Assessment and Plan


Assessment: 


Severe sepsis secondary to Covid pneumonia, beyond window for Remdesevir





Acute hypoxic respiratory failure secondary to the above





Gastroesophageal reflux disease





Chronic low back pain





Former nicotine dependence




















Plan: Continue on current medication regime ,monitoring and symptomatic 

treatment.ICU management as per pulmonary/intensivist.Covid regimen. Midline 

placement scheduled for today. Prognosis guarded.

















The impression and plan of care has been dictated as directed.





:


I performed a history and examination of this patient,  discussed the same with 

the dictator.  I agree with the dictator's note ,documented as a scribe.  Any 

additional findings or plans will be noted.

## 2021-04-13 LAB
ALBUMIN SERPL-MCNC: 2.8 G/DL (ref 3.5–5)
ALP SERPL-CCNC: 67 U/L (ref 38–126)
ALT SERPL-CCNC: 82 U/L (ref 4–49)
ANION GAP SERPL CALC-SCNC: 5 MMOL/L
AST SERPL-CCNC: 31 U/L (ref 17–59)
BASOPHILS # BLD AUTO: 0 K/UL (ref 0–0.2)
BASOPHILS NFR BLD AUTO: 0 %
BUN SERPL-SCNC: 29 MG/DL (ref 9–20)
CALCIUM SPEC-MCNC: 8.4 MG/DL (ref 8.4–10.2)
CHLORIDE SERPL-SCNC: 107 MMOL/L (ref 98–107)
CK SERPL-CCNC: 65 U/L (ref 55–170)
CO2 SERPL-SCNC: 23 MMOL/L (ref 22–30)
EOSINOPHIL # BLD AUTO: 0.1 K/UL (ref 0–0.7)
EOSINOPHIL NFR BLD AUTO: 1 %
ERYTHROCYTE [DISTWIDTH] IN BLOOD BY AUTOMATED COUNT: 5.37 M/UL (ref 4.3–5.9)
ERYTHROCYTE [DISTWIDTH] IN BLOOD: 12.2 % (ref 11.5–15.5)
FERRITIN SERPL-MCNC: 1047.8 NG/ML (ref 22–322)
GLUCOSE SERPL-MCNC: 128 MG/DL (ref 74–99)
HCT VFR BLD AUTO: 44.5 % (ref 39–53)
HGB BLD-MCNC: 15.3 GM/DL (ref 13–17.5)
LDH SPEC-CCNC: 1215 U/L (ref 313–618)
LYMPHOCYTES # SPEC AUTO: 0.4 K/UL (ref 1–4.8)
LYMPHOCYTES NFR SPEC AUTO: 3 %
MCH RBC QN AUTO: 28.5 PG (ref 25–35)
MCHC RBC AUTO-ENTMCNC: 34.3 G/DL (ref 31–37)
MCV RBC AUTO: 82.9 FL (ref 80–100)
MONOCYTES # BLD AUTO: 0.5 K/UL (ref 0–1)
MONOCYTES NFR BLD AUTO: 3 %
NEUTROPHILS # BLD AUTO: 14.8 K/UL (ref 1.3–7.7)
NEUTROPHILS NFR BLD AUTO: 93 %
PLATELET # BLD AUTO: 239 K/UL (ref 150–450)
POTASSIUM SERPL-SCNC: 4.8 MMOL/L (ref 3.5–5.1)
PROT SERPL-MCNC: 5.6 G/DL (ref 6.3–8.2)
SODIUM SERPL-SCNC: 135 MMOL/L (ref 137–145)
WBC # BLD AUTO: 15.8 K/UL (ref 3.8–10.6)

## 2021-04-13 RX ADMIN — DEXMEDETOMIDINE HYDROCHLORIDE SCH MLS/HR: 4 INJECTION, SOLUTION INTRAVENOUS at 15:24

## 2021-04-13 RX ADMIN — FLUCONAZOLE IN SODIUM CHLORIDE SCH MLS/HR: 2 INJECTION, SOLUTION INTRAVENOUS at 09:33

## 2021-04-13 RX ADMIN — DEXMEDETOMIDINE HYDROCHLORIDE SCH MLS/HR: 4 INJECTION, SOLUTION INTRAVENOUS at 23:34

## 2021-04-13 RX ADMIN — METHYLPREDNISOLONE SODIUM SUCCINATE SCH MG: 125 INJECTION, POWDER, FOR SOLUTION INTRAMUSCULAR; INTRAVENOUS at 23:28

## 2021-04-13 RX ADMIN — DEXMEDETOMIDINE HYDROCHLORIDE SCH MLS/HR: 4 INJECTION, SOLUTION INTRAVENOUS at 03:30

## 2021-04-13 RX ADMIN — Medication SCH MG: at 15:24

## 2021-04-13 RX ADMIN — METHYLPREDNISOLONE SODIUM SUCCINATE SCH MG: 125 INJECTION, POWDER, FOR SOLUTION INTRAMUSCULAR; INTRAVENOUS at 17:38

## 2021-04-13 RX ADMIN — ENOXAPARIN SODIUM SCH MG: 40 INJECTION SUBCUTANEOUS at 11:08

## 2021-04-13 RX ADMIN — TAMSULOSIN HYDROCHLORIDE SCH MG: 0.4 CAPSULE ORAL at 15:24

## 2021-04-13 RX ADMIN — METHYLPREDNISOLONE SODIUM SUCCINATE SCH MG: 125 INJECTION, POWDER, FOR SOLUTION INTRAMUSCULAR; INTRAVENOUS at 05:43

## 2021-04-13 RX ADMIN — PANTOPRAZOLE SODIUM SCH MG: 40 INJECTION, POWDER, FOR SOLUTION INTRAVENOUS at 08:44

## 2021-04-13 RX ADMIN — OXYCODONE HYDROCHLORIDE AND ACETAMINOPHEN SCH MG: 500 TABLET ORAL at 15:24

## 2021-04-13 RX ADMIN — Medication SCH MCG: at 15:24

## 2021-04-13 RX ADMIN — METHYLPREDNISOLONE SODIUM SUCCINATE SCH MG: 125 INJECTION, POWDER, FOR SOLUTION INTRAMUSCULAR; INTRAVENOUS at 11:09

## 2021-04-13 NOTE — P.PN
Subjective


Progress Note Date: 04/13/21








CoVID 19 pneumonia





50-year-old male, with history of shortness of breath, who presents to the 

emergency department on March 31, a little after 9:00 PM.  He apparently was 

brought in by EMS.  I saw the patient in the emergency room, in room 33.  He was

on O2 several liters by nasal cannula and not receiving any IV fluids.  He has 

been sick for about 11 or 12 days.  He apparently just tested positive today.  

His symptoms included shortness of breath, weakness, fatigue, low saturations, 

and fever.  As an outpatient, he was on Tylenol, a Z-Alex, Claritin, Zofran, 

Flomax, and a Medrol Dosepak.  He does have ALLERGIES to penicillin antibiotics,

and sulfa antibiotics.  The patient was quite fatigued when I saw him in the 

emergency room.  He could barely open his eyes.  He was not demonstrating any 

signs or symptoms of respiratory compromise, and did not have any conversational

dyspnea or accessory muscle use.  His white count was 4.2, hemoglobin 16, 

hematocrit 46.2, and platelet count 201,000.  PT/INR PTT were normal.  Sodium 

136, potassium 4.4, chlorides 104, CO2 24, anion gap 8, BUN 17, and creatinine 

0.94.  AST was 96, FiO2 104, LDH was 723, and C-reactive protein was 133.9.  

There was no d-dimer ordered.  A chest x-ray did reveal bilateral infiltrates.





The patient is seen today 04/02/2021 in follow-up on the regular medical floor. 

He is currently awake, alert, in no acute distress.  He is still requiring AirVo

high flow nasal cannula at 60 L and 90% FiO2 to maintain O2 saturations in the 

low 90s.  He is currently afebrile.  Hemodynamically stable.  Blood cultures 

reveal no growth.  He remains on Lovenox, Decadron, vitamin supplements. 





The patient is seen today 04/03/2021 in follow-up on the regular medical floor. 

He is currently resting on his right side in bed.  Still quite short of breath 

with minimal exertion.  Still requiring AirVo high flow oxygen at 60 L and 90% 

FiO2 to maintain O2 saturations in the 90s.  Chest x-ray continues to show 

persistent interstitial opacities improving and the left but now worsening on 

the right mid and lower lungs.  D-dimer 0.91.  .  C-reactive protein 2O2.

 He remains on dexamethasone, Lovenox, vitamin supplements.





The patient is seen today 04/04/2021 in follow-up in the intensive care unit.  

He was transferred here last evening after developing increasing shortness of 

breath and increasing oxygen requirements.  He is currently resting fairly 

comfortably in bed.  He remains on BiPAP 14/7 and 100% FiO2 to maintain O2 

saturations in the low 90s.  Chest x-ray continues to show persistent right 

greater than left diffuse interstitial opacities with interval decrease in the 

right lung base and mild increase on the left.  He did receive Tocilizumab and 

convalescent plasma yesterday.  He remains on Lovenox, IV Solu-Medrol, vitamin 

supplements.  White count 10.1.  Hemoglobin 15.5.  Lymphocytes 0.4.  D-dimer 

1.27.  Sodium 140.  Potassium 4.6.  Creatinine 0.82.  LDH 10,025.  C-reactive 

protein 171.





Progress note dated 04/05/2021.





The patient was admitted to the hospital on March 31.  He came to the ICU on 

April 3.  He is currently unfortunately on BiPAP at 14/7, and 100%.  He is 

getting saline at 75 mL an hour.  He is quite short of breath.  The patient did 

receive both convalescent plasma, and TOCI on April 3.  The patient doesn't feel

like he is getting better.  He doesn't feel any worse but just doesn't feel like

he is improving.  He does realize, that in the end, he may end up on the 

mechanical ventilator.  White count 14.7, hemoglobin 15.7, hematocrit 46.0, 

platelet count 321,000.  D-dimer is 1.45, and fibrinogen is 673.  Sodium 143, 

potassium 4.3, chlorides 109, CO2 26, anion gap 8, BUN 41, and creatinine 0.94. 

Most recent LDH is 1025.  The most recent C-reactive protein is 64.1.  Chest x-

ray continues to show bilateral diffuse interstitial infiltrates.





Progress note dated 04/06/2021.





50-year-old male, admitted to the hospital on March 31.  He came in to the ICU 

on April 3.  Currently, the patient's on BiPAP with IPAP of 14, EPAP of 7.  FiO2

is 100%.  When he is not on BiPAP, the patient's on 15 L high flow nasal O2.  In

addition, when he is on the high flow nasal oxygen, he also has a nonrebreather 

mask on as well.  The patient's getting saline at 75 mL an hour.  White count 

14, hemoglobin 14.7, hematocrit 43.7, and platelet count 328,000.  D-dimer 1.76.

 Sodium 142, potassium 4.8, chlorides 112, CO2 26, anion gap 4, BUN and 

creatinine were 39 and 0.91.  C-reactive protein is down to 29.6.  Chest x-ray 

shows diffuse bilateral infiltrates.





The patient is seen today 04/07/2021 in follow-up in the intensive care unit.  

He is currently sitting up in bed.  Awake and alert.  Currently on 15 L high 

flow nasal cannula along with 100% nonrebreather mask.  He did not utilize the 

BiPAP last evening.  He has 0.9 normal saline at 75 ML's per hour.  Still 

dyspneic with minimal exertion.  Chest x-ray continues to show diffuse mixed 

interstitial and alveolar infiltrates.  Stable compared to previous.  Blood 

culture reveals no growth.  White count 13.6.  Hemoglobin 14.8.  Lymphocytes 

0.4.  Sodium 142.  Potassium 4.2.  Creatinine 0.86.  LDH 1127, C-reactive 

protein 17.6.  He remains on bronchodilators, IV Solu-Medrol, Lovenox, vitamin 

supplements.





The patient is seen today 04/08/2021 in follow-up in the intensive care unit.  

He is currently resting fairly comfortably in bed.  No significant events 

overnight.  He is maintaining O2 saturations in the upper 80s and low 90s on 15 

L high flow nasal cannula and addition to a nonrebreather mask.  0.9 normal 

saline at 75 mL per hour.  Chest x-ray shows no changes and bilateral 

infiltrates.  White count 13.3.  Hemoglobin 15.3.  Lymphocytes 0.4.  D-dimer 

3.36.  Sodium 138.  Potassium 4.3.  Creatinine 0.72.  C-reactive protein 14.4.  

He remains on Lovenox, IV Solu-Medrol, vitamin supplements.





The patient is seen today 04/09/2021 follow-up in the intensive care unit.  He 

is currently sitting up in bed.  Awake and alert.  In mild respiratory distress.

 Still quite dyspneic with minimal exertion.  Still with oxygen desaturations 

with minimal exertion.  He is currently on airflow high flow oxygen at 60 L/m 

and 90% FiO2 along with a nonrebreather mask to maintain O2 saturations 85-86%. 

Morning blood gases revealed a PaO2 of 49, pCO2 30 and a pH of 7.48.  0.9 normal

sinus 75 ML's per hour.  He has received a unit of convalescent plasma.  Blood 

cultures reveal no growth.  White count 12.2.  Hemoglobin 15.2.  Lymphocytes 

0.3.  D-dimer 2.84.  Sodium 138.  Potassium 4.3.  Creatinine 0.69.  LDH 1196.  

C-reactive protein 10.4.  Remains on IV Solu-Medrol, rhonchi dilators, Lovenox, 

vitamin supplements.





The patient is seen today 04/10/2021 follow-up in the intensive care unit.  He 

is currently sitting up in bed.  Awake and alert in no acute distress.  Still 

quite dyspneic with minimal exertion in conversation.  He remains on AirVo high 

flow oxygen at 60 L and 95% FiO2 along with a nonrebreather mask with O2 

saturations in the mid 80s.  He did receive convalescent plasma and tocilizumab 

back on 04/03/2021.  He has a 0.9 normal saline at 75 mL per hour.  Yesterday's 

blood gases revealed a pO2 of 49, pCO2 of 30 and a pH of 7.5.  Chest x-ray 

revealing slight improvement of bibasilar infiltrates.  Blood cultures reveal no

growth.  White count 12.7.  Hemoglobin 15.1.  D-dimer 2.47.  Sodium 135.  

Potassium 4.3.  Creatinine 0.74.  LDH 1213.  C-reactive protein 8.6.  Continued 

on Lovenox, IV Solu-Medrol, vitamin supplements.





The patient is seen today 04/11/2021 in follow-up in the intensive care unit.  

He remains sitting up in bed.  Awake and alert.  He is continued on AirVo high 

flow oxygen at 60 L and 95% FiO2 along with a nonrebreather mask.  O2 

saturations in the 80s.  He is 0.9 normal saline at 75 ML's per hour.  He 

received convalescent plasma 1.  Blood cultures reveal no growth.  White count 

11.1.  Hemoglobin 15.3.  Lymphocytes 0.4.  Sodium 134.  Potassium 4.4.  

Creatinine 0.71.  LDH 1172.  C-reactive protein 7.5.  He remains on Lovenox, 

Solu-Medrol, vitamin supplements. 





On 04/12/2021, I'm seeing this patient in follow-up.  The patient was Hospital 

as forCOVID 19  pneumonia and the patient progressive hypoxic respiratory 

failure.  The patient was initially admitted on 04/04/2021 and the patient is 

currently in the intensive care unit requiring high flow oxygen.  He is 

currently on high flow oxygen at 60 L with an FiO2 of 95% in addition to a 

nonrebreather facemask.  He is awake and alert.  He is short of breath with 

limited amount of activity and he easily desaturates.  No significant chest 

pain.  Cough with deep breathing.  The patient has already received convalescent

plasma.  The patient is currently on IV Solu-Medrol 60 mg every 6 hours of this 

in addition to multivitamins. He was also treated with Tocilizumab.  LDH level 

from yesterday was 1157 and the CRP was 8.1.  Chest x-ray still unchanged with 

diffuse bilateral pulmonary infiltrates.  This patient otherwise has been in 

good state of health.  No chronic illnesses otherwise.  He is being monitored 

very closely here in the intensive care unit due to concerns of progressive 

respiratory failure requiring intubation mechanical ventilation.  Blood cultures

of been negative thus far.





On 04/13/2021, the patient is being seen for a follow-up.  This is a case of 

Coumadin related pneumonia with hypoxic respiratory failure.  The patient was on

high flow oxygen yesterday and he was on 60 L with an FiO2 of 95%.  He was also 

using the nonrebreather facemask in addition to the high flow oxygen.  Was doing

some activity and movement yesterday, the patient desaturated down to the 60s 

and he was hard to recovered.  At this time, the patient became quite restless 

and agitated.  He was placed on a BiPAP briefly which made him more panicky 

uncomfortable.  Overnight, he was also started on Precedex which is currently ru

nning at 0.2 mcg/kg per minute.  Ultimately, he was taken off the BiPAP and the 

patient is currently back on high flow oxygen at 60 L with a 93% in addition to 

100% nonrebreather facemask.  His current pulse ox is order of 76-82 percent.  

The chest x-ray from today is showing bilateral lower lobe pulmonary infiltrates

and compared to yesterday's chest x-ray, there is no major interval change.  Fin

dings and essentially stable.  Meanwhile, his blood work from today is showing a

white cell count of 15.8, he does have lymphopenia, renal function is stable, 

LDH level is 1-15 and the CRP is 7.4.  I also check a pro-calcitonin level on 

him yesterday and the level came back low at 0.08.  Meanwhile, the patient is 

currently on Solu-Medrol 60 mg every 6 hours he is also on Lovenox 40 mg subcu 

every 24 hours.  The patient remains on Precedex for now.  His calm and 

comfortable and responsive.





Objective





- Vital Signs


Vital signs: 


                                   Vital Signs











Temp  97.4 F L  04/13/21 04:00


 


Pulse  58 L  04/13/21 06:00


 


Resp  29 H  04/13/21 06:00


 


BP  110/86   04/13/21 06:00


 


Pulse Ox  82 L  04/13/21 06:00








                                 Intake & Output











 04/12/21 04/13/21 04/13/21





 18:59 06:59 18:59


 


Intake Total 370 293.034 


 


Output Total 1300 350 


 


Balance -930 -56.966 


 


Weight 103.9 kg 103.2 kg 


 


Intake:   


 


   220 


 


    Sodium Chloride 0.9% 1, 370 220 





    000 ml @ 20 mls/hr IV .   





    Q24H SHAZIA Rx#:[id number]   


 


  Intake, IV Titration  73.034 





  Amount   


 


    Dexmedetomidine/0.9% NaCl  73.034 





    (Pmx) 400 mcg In Empty   





    Bag 1 bag @ Titrate IV .   





    Q0M SHAZIA Rx#:[id number]   


 


Output:   


 


  Urine 1300 350 


 


Other:   


 


  Voiding Method Urinal Urinal 


 


  # Voids 1  














- Exam








GENERAL EXAM: Alert, pleasant 50-year-old gentleman, on AirVo at 60 L/m, 90% 

FiO2, plus a nonrebreather mask, comfortable in mild respiratory distress.


HEAD: Normocephalic.


EYES: Normal reaction of pupils, equal size.


NOSE: Clear with pink turbinates.


THROAT: No erythema or exudates.


NECK: No masses, no JVD.


CHEST: No chest wall deformity.


LUNGS: Equal air entry with basilar crackles.


CVS: S1 and S2 normal with no audible murmur, regular rhythm.


ABDOMEN: No hepatosplenomegaly, normal bowel sounds, no guarding or rigidity.


SPINE: No scoliosis or deformity


SKIN: No rashes


CENTRAL NERVOUS SYSTEM: No focal deficits, tone is normal in all 4 extremities.


EXTREMITIES: There is no peripheral edema.  No clubbing, no cyanosis.  

Peripheral pulses are intact.





- Labs


CBC & Chem 7: 


                                 04/13/21 03:04





                                 04/13/21 03:04


Labs: 


                  Abnormal Lab Results - Last 24 Hours (Table)











  04/12/21 04/13/21 04/13/21 Range/Units





  03:41 03:04 03:04 


 


WBC   15.8 H   (3.8-10.6)  k/uL


 


Neutrophils #   14.8 H   (1.3-7.7)  k/uL


 


Lymphocytes #   0.4 L   (1.0-4.8)  k/uL


 


Sodium    135 L  (137-145)  mmol/L


 


BUN    29 H  (9-20)  mg/dL


 


Glucose    128 H  (74-99)  mg/dL


 


Ferritin  952.1 H    (22.0-322.0)  ng/mL


 


ALT    82 H  (4-49)  U/L


 


Lactate Dehydrogenase    1215 H  (313-618)  U/L


 


Total Protein    5.6 L  (6.3-8.2)  g/dL


 


Albumin    2.8 L  (3.5-5.0)  g/dL














Assessment and Plan


Plan: 








1 Acute hypoxic respiratory failure secondary to CoVID 19 pneumonia.  Outside 

the window for Remdesivir.  Did receive tocilizumab and 1 unit of convalescent 

plasma.  Remains on AirVo 60 L and 95% FiO2 along with a nonrebreather mask.  O2

saturations in the 80s.  The chest x-ray showing small lung volumes.  

Infiltrates are bilaterally more so in the lung bases with a peripheral 

distribution.  The patient is a high risk of progression and requiring 

intubation mechanical ventilation for that reason the patient is being monitored

in the intensive care unit.  The patient desaturates with limited amount of 

activity.  He has done very slow progress over this past 1 week and the patient 

has been hospitalized since March 31.  On today's evaluation the patient remains

on high flow oxygen at 60 L with an FiO2 of 93%.  He is also using a 

nonrebreather facemask.  His oxygenation is poor.  He is not tolerating the 

BiPAP effectively he prefers to stay on the high flow oxygen and for that reason

he was also given some Precedex to control his acidosis and agitation.  Chest x-

ray findings are essentially unchanged and stable.  Inflammatory markers remain 

elevated.





2 History of chronic tobacco dependence





3 elevated inflammatory markers secondary to Covid 19 infection





4 leukocytosis





Plan: 


 





Encouraged again frequent position changes, including prone positioning


Midline catheter insertion today


Continue IV Solu-Medrol


Continue Lovenox prophylactic dose of 40 mg subcu every 24 , and the levels from

yesterday was low in terms of his d-dimer is


Moderate inflammatory markers was noted from today


Adequate monitoring of his oxygenation on high flow which is currently at 60 L 

with an FiO2 of less than 95%


Check pro calcitonin level are low


Based on his ongoing hypoxemia, severe angios the chest will be done to rule out

any other possibilities contributing to his chronic hypoxemic respiratory 

failure.  My feeling is that the patient has over that he related pneumonia 

secondary ARDS and persistent hypoxemia is mainly related to this.  The CAT scan

of the chest will be done as long as this can be done safely knowing that the 

patient's oxygenation is quite borderline.  We'll also check a Doppler of the 

lower extremities.


Start the patient on Lasix 40 mg IV every 24 hours


We'll try to maintain a negative fluid balance of possible





Critical care time >30 minutes.


Time with Patient: Greater than 30

## 2021-04-13 NOTE — XR
EXAMINATION TYPE: XR chest 1V portable

 

DATE OF EXAM: 4/13/2021

 

COMPARISON: 4/12/2021

 

HISTORY: Cough

 

TECHNIQUE: Single frontal view of the chest is obtained.

 

FINDINGS:  Patchy bilateral infiltrate stable. Heart size is prominent but unchanged. No pneumothorax
. Biapical pleural thickening. Hypertrophic change of the spine. Arthropathy of the shoulders.

 

IMPRESSION:  Bilateral patchy infiltrates are stable.

## 2021-04-13 NOTE — US
EXAMINATION TYPE: US venous doppler duplex LE BI

 

DATE OF EXAM: 4/13/2021 8:27 AM

 

COMPARISON: NONE

 

CLINICAL HISTORY: elevated d-dimer.

Exam done portable in ICU.

 

SIDE PERFORMED: Bilateral  

 

TECHNIQUE:  The lower extremity deep venous system is examined utilizing real time linear array sonog
mathew with graded compression, doppler sonography and color-flow sonography.

 

VESSELS IMAGED:

Common Femoral Vein

Deep Femoral Vein

Greater Saphenous Vein *

Femoral Vein

Popliteal Vein

Small Saphenous Vein *

Proximal Calf Veins

(* superficial vessels)

 

 

 

Right Leg:  Appears negative for DVT

 

Left Leg:  Appears negative for DVT

 

 

 

IMPRESSION:

1. No diagnostic evidence of DVT as visualized

## 2021-04-13 NOTE — P.PN
Subjective


Progress Note Date: 04/13/21


Malcom Griffith is a 49 yo M with PMH of allergies who presented to the ED via EMS

with worsening malaise and shortness of breath. He states he developed a cough 

and sore throat about 2 weeks ago which has worsened since then. He complains of

fever chills and shortness of breath. On arrival he was febrile tachypenic and 

hypoxic SpO2 94% on 4 L O2. WBC 5.8, lymphopenia, , , COVID 

positive, CXR with coarse bilateral infiltrates.





[id number] maintained on 90% airflow, O2 sats in the low 90s.  Positive 

nonproductive cough .  Outside window for Remdesevir, continue on steroids, 

vitamins,lovenox. Chest pain, palpitations, complains of chronic back pain.  T-

max 100.2.  Preliminary blood cultures reporting no growth at 24 hours





04/05/2021 status post convalescent plasma, TOCI, maintaining O2 sats in the 90s

on 100% BiPAP.  Chest x-ray reporting stable diffuse bilateral infiltrates.  

Afebrile, T-max 99.2, WBC 14.7.  Hemoglobin 15.7, platelets 321.  BUN 41, 

creatinine 0.94. D-dimer 1.45, ferritin 1511.4, LDH 1025, CRP 54.1.





04/06/2021 alternating between BiPAP and 15 L high flow nasal cannula combined 

with nonrebreather, maintaining O2 sats in the high 80s.  Chest x-ray reporting 

diffuse bilateral infiltrates stable. T-max 99,WBC 14.  Hemoglobin 14.7, 

platelets 328 D-dimer 1.76, ferritin and CRP decreased.  BUN 39, creatinine 

0.91.








04/07/2021 maintained on 15 L-nasal cannula with  nonrebreather mask, IV 

steroids, bronchodilators,maintaining O2 sats in the high 80s.  Did not Require 

BiPAP during the night.  Feels better.  Nonproductive cough.  Complains of mild 

chest tightness with coughing. Chest x-ray reporting stable diffuse bilateral 

infiltrate.  Afebrile, WBC 13.6.  Hemoglobin 14.8, platelets 300. BUN 33, 

creatinine 0.86.  Blood sugars controlled. LDH elevated, 1127, CRP down to 17.6.










04/08/2021  continue on IV steroids, vitamins, maintaining O2 sats in the mid to

high 80s on 15 L nasal cannula plus nonrebreather.  Nonproductive cough.  

Reports she feels better.  Chest x-ray reporting no change in bilateral 

infiltrates.  Afebrile.  Creatinine 0.72.





04/09/2021 Continues on airflow high flow oxygen at 60 L/m and 90% FiO2 along 

with a nonrebreather mask, maintaining O2 sats in the high 80s.   Minimal cough.

 Complains of dry mouth.  He reports, continues to feel better.  Afebrile, WBC 

down to 12.2.  D-dimer, CRP decreased.  LDH increased ,1196.





04/12/2021 maintained on high flow nasal cannula 60 L in addition to 

nonrebreather maintaining better O2 sats  in the high 90s to 100%.  Chest x-ray 

reporting stable bilateral infiltrates .complains of exertional shortness of 

breath. Afebrile, T-max 99.5, WBC 19.  D-dimer, ferritin, LDH  decreased, CRP 

8.1.  BUN 25, creatinine 0.75.  Scheduled to have midline placed today








04/13/2021 rough night, Precedex initiated and patient placed on BiPAP.  This 

morning ,desating down into the 60s, on BiPAP.FiO2 adjusted, with reported O2 

sats improved into the 80s.Chest x-ray reporting stable  patchy bilateral Inf

iltrates.  Doppler reporting negative for DVT of bilateral lower extremities.  

Inflammatory markers elevated.





Objective





- Vital Signs


Vital signs: 


                                   Vital Signs











Temp  97.4 F L  04/13/21 08:00


 


Pulse  75   04/13/21 09:00


 


Resp  31 H  04/13/21 09:00


 


BP  147/92   04/13/21 09:00


 


Pulse Ox  73 L  04/13/21 09:00








                                 Intake & Output











 04/12/21 04/13/21 04/13/21





 18:59 06:59 18:59


 


Intake Total 370 293.034 274.428


 


Output Total 6610 722 1329


 


Balance -930 -56.966 -1975.572


 


Weight 103.9 kg 103.2 kg 


 


Intake:   


 


   220 100


 


    Sodium Chloride 0.9% 1, 370 220 100





    000 ml @ 20 mls/hr IV .   





    Q24H [facility] Rx#:[id number]   


 


  Intake, IV Titration  73.034 144.428





  Amount   


 


    Dexmedetomidine/0.9% NaCl  73.034 94.428





    (Pmx) 400 mcg In Empty   





    Bag 1 bag @ Titrate IV .   





    Q0M [facility] Rx#:[id number]   


 


    Fluconazole in NaCl,Iso-   50





    Osm 100 mg In Saline 1   





    50ml.bag @ 50 mls/hr IVPB   





    DAILY SHAZIA Rx#:[id number]   


 


  Oral   30


 


Output:   


 


  Urine 6543 054 2984


 


Other:   


 


  Voiding Method Urinal Urinal Urinal


 


  # Voids 1  














- Exam





General: Sitting up in bed, wearing BiPAP


Lungs: Bilateral bases diminished, bilateral rales


CV: Regular rate and rhythm, no murmur. Peripheral pulses 2+


Abdomen: soft, nondistended, no organomegy, positive bowel sounds


Skin: warm and dry.


Neuro: A&Ox3, normal mood and affect








- Labs


CBC & Chem 7: 


                                 04/13/21 03:04





                                 04/13/21 03:04


Labs: 


                  Abnormal Lab Results - Last 24 Hours (Table)











  04/13/21 04/13/21 04/13/21 Range/Units





  03:04 03:04 08:08 


 


WBC  15.8 H    (3.8-10.6)  k/uL


 


Neutrophils #  14.8 H    (1.3-7.7)  k/uL


 


Lymphocytes #  0.4 L    (1.0-4.8)  k/uL


 


D-Dimer    4.19 H  (<0.60)  mg/L FEU


 


Sodium   135 L   (137-145)  mmol/L


 


BUN   29 H   (9-20)  mg/dL


 


Glucose   128 H   (74-99)  mg/dL


 


ALT   82 H   (4-49)  U/L


 


Lactate Dehydrogenase   1215 H   (313-618)  U/L


 


Total Protein   5.6 L   (6.3-8.2)  g/dL


 


Albumin   2.8 L   (3.5-5.0)  g/dL














Assessment and Plan


Assessment: 


Severe sepsis secondary to Covid pneumonia, beyond window for Remdesevir





Acute hypoxic respiratory failure secondary to the above





Gastroesophageal reflux disease





Chronic low back pain





Former nicotine dependence




















Plan: Continue on current medication regime ,monitoring and symptomatic nicolás

atment.ICU management as per pulmonary/intensivist.Covid regimen.  Difficulty 

oxygenating on BiPAP .Prognosis guarded.

















The impression and plan of care has been dictated as directed.





:


I performed a history and examination of this patient,  discussed the same with 

the dictator.  I agree with the dictator's note ,documented as a scribe.  Any 

additional findings or plans will be noted.

## 2021-04-14 LAB
ALBUMIN SERPL-MCNC: 2.9 G/DL (ref 3.5–5)
ALP SERPL-CCNC: 79 U/L (ref 38–126)
ALT SERPL-CCNC: 79 U/L (ref 4–49)
ANION GAP SERPL CALC-SCNC: 5 MMOL/L
AST SERPL-CCNC: 36 U/L (ref 17–59)
BASOPHILS # BLD AUTO: 0 K/UL (ref 0–0.2)
BASOPHILS NFR BLD AUTO: 0 %
BUN SERPL-SCNC: 40 MG/DL (ref 9–20)
CALCIUM SPEC-MCNC: 8.6 MG/DL (ref 8.4–10.2)
CHLORIDE SERPL-SCNC: 106 MMOL/L (ref 98–107)
CO2 BLDA-SCNC: 24 MMOL/L (ref 19–24)
CO2 SERPL-SCNC: 25 MMOL/L (ref 22–30)
EOSINOPHIL # BLD AUTO: 0 K/UL (ref 0–0.7)
EOSINOPHIL NFR BLD AUTO: 0 %
ERYTHROCYTE [DISTWIDTH] IN BLOOD BY AUTOMATED COUNT: 5.91 M/UL (ref 4.3–5.9)
ERYTHROCYTE [DISTWIDTH] IN BLOOD: 12.8 % (ref 11.5–15.5)
FERRITIN SERPL-MCNC: 1138.2 NG/ML (ref 22–322)
GLUCOSE SERPL-MCNC: 124 MG/DL (ref 74–99)
HCO3 BLDA-SCNC: 23 MMOL/L (ref 21–25)
HCT VFR BLD AUTO: 50.3 % (ref 39–53)
HGB BLD-MCNC: 16.7 GM/DL (ref 13–17.5)
LDH SPEC-CCNC: 1409 U/L (ref 313–618)
LYMPHOCYTES # SPEC AUTO: 0.5 K/UL (ref 1–4.8)
LYMPHOCYTES NFR SPEC AUTO: 2 %
MCH RBC QN AUTO: 28.3 PG (ref 25–35)
MCHC RBC AUTO-ENTMCNC: 33.3 G/DL (ref 31–37)
MCV RBC AUTO: 85 FL (ref 80–100)
MONOCYTES # BLD AUTO: 0.6 K/UL (ref 0–1)
MONOCYTES NFR BLD AUTO: 3 %
NEUTROPHILS # BLD AUTO: 18.9 K/UL (ref 1.3–7.7)
NEUTROPHILS NFR BLD AUTO: 94 %
PCO2 BLDA: 35 MMHG (ref 35–45)
PH BLDA: 7.42 [PH] (ref 7.35–7.45)
PLATELET # BLD AUTO: 320 K/UL (ref 150–450)
PO2 BLDA: 45 MMHG (ref 83–108)
POTASSIUM SERPL-SCNC: 4.7 MMOL/L (ref 3.5–5.1)
PROT SERPL-MCNC: 5.7 G/DL (ref 6.3–8.2)
SODIUM SERPL-SCNC: 136 MMOL/L (ref 137–145)
WBC # BLD AUTO: 20.1 K/UL (ref 3.8–10.6)

## 2021-04-14 RX ADMIN — Medication SCH: at 11:33

## 2021-04-14 RX ADMIN — DEXMEDETOMIDINE HYDROCHLORIDE SCH MLS/HR: 4 INJECTION, SOLUTION INTRAVENOUS at 21:58

## 2021-04-14 RX ADMIN — PANTOPRAZOLE SODIUM SCH MG: 40 INJECTION, POWDER, FOR SOLUTION INTRAVENOUS at 09:10

## 2021-04-14 RX ADMIN — DEXMEDETOMIDINE HYDROCHLORIDE SCH MLS/HR: 4 INJECTION, SOLUTION INTRAVENOUS at 00:36

## 2021-04-14 RX ADMIN — ENOXAPARIN SODIUM SCH MG: 100 INJECTION SUBCUTANEOUS at 21:16

## 2021-04-14 RX ADMIN — TAMSULOSIN HYDROCHLORIDE SCH: 0.4 CAPSULE ORAL at 11:33

## 2021-04-14 RX ADMIN — MORPHINE SULFATE PRN MG: 4 INJECTION, SOLUTION INTRAMUSCULAR; INTRAVENOUS at 15:21

## 2021-04-14 RX ADMIN — ENOXAPARIN SODIUM SCH MG: 100 INJECTION SUBCUTANEOUS at 09:11

## 2021-04-14 RX ADMIN — MORPHINE SULFATE PRN MG: 4 INJECTION, SOLUTION INTRAMUSCULAR; INTRAVENOUS at 08:27

## 2021-04-14 RX ADMIN — METHYLPREDNISOLONE SODIUM SUCCINATE SCH MG: 125 INJECTION, POWDER, FOR SOLUTION INTRAMUSCULAR; INTRAVENOUS at 05:20

## 2021-04-14 RX ADMIN — OXYCODONE HYDROCHLORIDE AND ACETAMINOPHEN SCH: 500 TABLET ORAL at 11:32

## 2021-04-14 RX ADMIN — METHYLPREDNISOLONE SODIUM SUCCINATE SCH MG: 125 INJECTION, POWDER, FOR SOLUTION INTRAMUSCULAR; INTRAVENOUS at 12:09

## 2021-04-14 RX ADMIN — FLUCONAZOLE IN SODIUM CHLORIDE SCH MLS/HR: 2 INJECTION, SOLUTION INTRAVENOUS at 09:11

## 2021-04-14 RX ADMIN — CEFAZOLIN SCH: 330 INJECTION, POWDER, FOR SOLUTION INTRAMUSCULAR; INTRAVENOUS at 05:20

## 2021-04-14 RX ADMIN — DEXMEDETOMIDINE HYDROCHLORIDE SCH MLS/HR: 4 INJECTION, SOLUTION INTRAVENOUS at 08:29

## 2021-04-14 RX ADMIN — DEXMEDETOMIDINE HYDROCHLORIDE SCH MLS/HR: 4 INJECTION, SOLUTION INTRAVENOUS at 16:47

## 2021-04-14 RX ADMIN — CEFAZOLIN SCH MLS/HR: 330 INJECTION, POWDER, FOR SOLUTION INTRAMUSCULAR; INTRAVENOUS at 19:04

## 2021-04-14 RX ADMIN — METHYLPREDNISOLONE SODIUM SUCCINATE SCH MG: 125 INJECTION, POWDER, FOR SOLUTION INTRAMUSCULAR; INTRAVENOUS at 19:03

## 2021-04-14 NOTE — P.PN
Subjective


Progress Note Date: 04/14/21








CoVID 19 pneumonia





50-year-old male, with history of shortness of breath, who presents to the 

emergency department on March 31, a little after 9:00 PM.  He apparently was 

brought in by EMS.  I saw the patient in the emergency room, in room 33.  He was

on O2 several liters by nasal cannula and not receiving any IV fluids.  He has 

been sick for about 11 or 12 days.  He apparently just tested positive today.  

His symptoms included shortness of breath, weakness, fatigue, low saturations, 

and fever.  As an outpatient, he was on Tylenol, a Z-Alex, Claritin, Zofran, 

Flomax, and a Medrol Dosepak.  He does have ALLERGIES to penicillin antibiotics,

and sulfa antibiotics.  The patient was quite fatigued when I saw him in the 

emergency room.  He could barely open his eyes.  He was not demonstrating any 

signs or symptoms of respiratory compromise, and did not have any conversational

dyspnea or accessory muscle use.  His white count was 4.2, hemoglobin 16, 

hematocrit 46.2, and platelet count 201,000.  PT/INR PTT were normal.  Sodium 

136, potassium 4.4, chlorides 104, CO2 24, anion gap 8, BUN 17, and creatinine 

0.94.  AST was 96, FiO2 104, LDH was 723, and C-reactive protein was 133.9.  

There was no d-dimer ordered.  A chest x-ray did reveal bilateral infiltrates.





The patient is seen today 04/02/2021 in follow-up on the regular medical floor. 

He is currently awake, alert, in no acute distress.  He is still requiring AirVo

high flow nasal cannula at 60 L and 90% FiO2 to maintain O2 saturations in the 

low 90s.  He is currently afebrile.  Hemodynamically stable.  Blood cultures 

reveal no growth.  He remains on Lovenox, Decadron, vitamin supplements. 





The patient is seen today 04/03/2021 in follow-up on the regular medical floor. 

He is currently resting on his right side in bed.  Still quite short of breath 

with minimal exertion.  Still requiring AirVo high flow oxygen at 60 L and 90% 

FiO2 to maintain O2 saturations in the 90s.  Chest x-ray continues to show 

persistent interstitial opacities improving and the left but now worsening on 

the right mid and lower lungs.  D-dimer 0.91.  .  C-reactive protein 2O2.

 He remains on dexamethasone, Lovenox, vitamin supplements.





The patient is seen today 04/04/2021 in follow-up in the intensive care unit.  

He was transferred here last evening after developing increasing shortness of 

breath and increasing oxygen requirements.  He is currently resting fairly 

comfortably in bed.  He remains on BiPAP 14/7 and 100% FiO2 to maintain O2 

saturations in the low 90s.  Chest x-ray continues to show persistent right 

greater than left diffuse interstitial opacities with interval decrease in the 

right lung base and mild increase on the left.  He did receive Tocilizumab and 

convalescent plasma yesterday.  He remains on Lovenox, IV Solu-Medrol, vitamin 

supplements.  White count 10.1.  Hemoglobin 15.5.  Lymphocytes 0.4.  D-dimer 

1.27.  Sodium 140.  Potassium 4.6.  Creatinine 0.82.  LDH 10,025.  C-reactive 

protein 171.





Progress note dated 04/05/2021.





The patient was admitted to the hospital on March 31.  He came to the ICU on 

April 3.  He is currently unfortunately on BiPAP at 14/7, and 100%.  He is 

getting saline at 75 mL an hour.  He is quite short of breath.  The patient did 

receive both convalescent plasma, and TOCI on April 3.  The patient doesn't feel

like he is getting better.  He doesn't feel any worse but just doesn't feel like

he is improving.  He does realize, that in the end, he may end up on the 

mechanical ventilator.  White count 14.7, hemoglobin 15.7, hematocrit 46.0, 

platelet count 321,000.  D-dimer is 1.45, and fibrinogen is 673.  Sodium 143, 

potassium 4.3, chlorides 109, CO2 26, anion gap 8, BUN 41, and creatinine 0.94. 

Most recent LDH is 1025.  The most recent C-reactive protein is 64.1.  Chest x-

ray continues to show bilateral diffuse interstitial infiltrates.





Progress note dated 04/06/2021.





50-year-old male, admitted to the hospital on March 31.  He came in to the ICU 

on April 3.  Currently, the patient's on BiPAP with IPAP of 14, EPAP of 7.  FiO2

is 100%.  When he is not on BiPAP, the patient's on 15 L high flow nasal O2.  In

addition, when he is on the high flow nasal oxygen, he also has a nonrebreather 

mask on as well.  The patient's getting saline at 75 mL an hour.  White count 

14, hemoglobin 14.7, hematocrit 43.7, and platelet count 328,000.  D-dimer 1.76.

 Sodium 142, potassium 4.8, chlorides 112, CO2 26, anion gap 4, BUN and 

creatinine were 39 and 0.91.  C-reactive protein is down to 29.6.  Chest x-ray 

shows diffuse bilateral infiltrates.





The patient is seen today 04/07/2021 in follow-up in the intensive care unit.  

He is currently sitting up in bed.  Awake and alert.  Currently on 15 L high 

flow nasal cannula along with 100% nonrebreather mask.  He did not utilize the 

BiPAP last evening.  He has 0.9 normal saline at 75 ML's per hour.  Still 

dyspneic with minimal exertion.  Chest x-ray continues to show diffuse mixed 

interstitial and alveolar infiltrates.  Stable compared to previous.  Blood 

culture reveals no growth.  White count 13.6.  Hemoglobin 14.8.  Lymphocytes 

0.4.  Sodium 142.  Potassium 4.2.  Creatinine 0.86.  LDH 1127, C-reactive 

protein 17.6.  He remains on bronchodilators, IV Solu-Medrol, Lovenox, vitamin 

supplements.





The patient is seen today 04/08/2021 in follow-up in the intensive care unit.  

He is currently resting fairly comfortably in bed.  No significant events 

overnight.  He is maintaining O2 saturations in the upper 80s and low 90s on 15 

L high flow nasal cannula and addition to a nonrebreather mask.  0.9 normal 

saline at 75 mL per hour.  Chest x-ray shows no changes and bilateral 

infiltrates.  White count 13.3.  Hemoglobin 15.3.  Lymphocytes 0.4.  D-dimer 

3.36.  Sodium 138.  Potassium 4.3.  Creatinine 0.72.  C-reactive protein 14.4.  

He remains on Lovenox, IV Solu-Medrol, vitamin supplements.





The patient is seen today 04/09/2021 follow-up in the intensive care unit.  He 

is currently sitting up in bed.  Awake and alert.  In mild respiratory distress.

 Still quite dyspneic with minimal exertion.  Still with oxygen desaturations 

with minimal exertion.  He is currently on airflow high flow oxygen at 60 L/m 

and 90% FiO2 along with a nonrebreather mask to maintain O2 saturations 85-86%. 

Morning blood gases revealed a PaO2 of 49, pCO2 30 and a pH of 7.48.  0.9 normal

sinus 75 ML's per hour.  He has received a unit of convalescent plasma.  Blood 

cultures reveal no growth.  White count 12.2.  Hemoglobin 15.2.  Lymphocytes 

0.3.  D-dimer 2.84.  Sodium 138.  Potassium 4.3.  Creatinine 0.69.  LDH 1196.  

C-reactive protein 10.4.  Remains on IV Solu-Medrol, rhonchi dilators, Lovenox, 

vitamin supplements.





The patient is seen today 04/10/2021 follow-up in the intensive care unit.  He 

is currently sitting up in bed.  Awake and alert in no acute distress.  Still 

quite dyspneic with minimal exertion in conversation.  He remains on AirVo high 

flow oxygen at 60 L and 95% FiO2 along with a nonrebreather mask with O2 

saturations in the mid 80s.  He did receive convalescent plasma and tocilizumab 

back on 04/03/2021.  He has a 0.9 normal saline at 75 mL per hour.  Yesterday's 

blood gases revealed a pO2 of 49, pCO2 of 30 and a pH of 7.5.  Chest x-ray 

revealing slight improvement of bibasilar infiltrates.  Blood cultures reveal no

growth.  White count 12.7.  Hemoglobin 15.1.  D-dimer 2.47.  Sodium 135.  

Potassium 4.3.  Creatinine 0.74.  LDH 1213.  C-reactive protein 8.6.  Continued 

on Lovenox, IV Solu-Medrol, vitamin supplements.





The patient is seen today 04/11/2021 in follow-up in the intensive care unit.  

He remains sitting up in bed.  Awake and alert.  He is continued on AirVo high 

flow oxygen at 60 L and 95% FiO2 along with a nonrebreather mask.  O2 

saturations in the 80s.  He is 0.9 normal saline at 75 ML's per hour.  He 

received convalescent plasma 1.  Blood cultures reveal no growth.  White count 

11.1.  Hemoglobin 15.3.  Lymphocytes 0.4.  Sodium 134.  Potassium 4.4.  

Creatinine 0.71.  LDH 1172.  C-reactive protein 7.5.  He remains on Lovenox, 

Solu-Medrol, vitamin supplements. 





On 04/12/2021, I'm seeing this patient in follow-up.  The patient was Hospital 

as forCOVID 19  pneumonia and the patient progressive hypoxic respiratory 

failure.  The patient was initially admitted on 04/04/2021 and the patient is 

currently in the intensive care unit requiring high flow oxygen.  He is 

currently on high flow oxygen at 60 L with an FiO2 of 95% in addition to a 

nonrebreather facemask.  He is awake and alert.  He is short of breath with 

limited amount of activity and he easily desaturates.  No significant chest 

pain.  Cough with deep breathing.  The patient has already received convalescent

plasma.  The patient is currently on IV Solu-Medrol 60 mg every 6 hours of this 

in addition to multivitamins. He was also treated with Tocilizumab.  LDH level 

from yesterday was 1157 and the CRP was 8.1.  Chest x-ray still unchanged with 

diffuse bilateral pulmonary infiltrates.  This patient otherwise has been in 

good state of health.  No chronic illnesses otherwise.  He is being monitored 

very closely here in the intensive care unit due to concerns of progressive 

respiratory failure requiring intubation mechanical ventilation.  Blood cultures

of been negative thus far.





On 04/13/2021, the patient is being seen for a follow-up.  This is a case of 

Coumadin related pneumonia with hypoxic respiratory failure.  The patient was on

high flow oxygen yesterday and he was on 60 L with an FiO2 of 95%.  He was also 

using the nonrebreather facemask in addition to the high flow oxygen.  Was doing

some activity and movement yesterday, the patient desaturated down to the 60s 

and he was hard to recovered.  At this time, the patient became quite restless 

and agitated.  He was placed on a BiPAP briefly which made him more panicky 

uncomfortable.  Overnight, he was also started on Precedex which is currently ru

nning at 0.2 mcg/kg per minute.  Ultimately, he was taken off the BiPAP and the 

patient is currently back on high flow oxygen at 60 L with a 93% in addition to 

100% nonrebreather facemask.  His current pulse ox is order of 76-82 percent.  

The chest x-ray from today is showing bilateral lower lobe pulmonary infiltrates

and compared to yesterday's chest x-ray, there is no major interval change.  Fin

dings and essentially stable.  Meanwhile, his blood work from today is showing a

white cell count of 15.8, he does have lymphopenia, renal function is stable, 

LDH level is 1-15 and the CRP is 7.4.  I also check a pro-calcitonin level on 

him yesterday and the level came back low at 0.08.  Meanwhile, the patient is 

currently on Solu-Medrol 60 mg every 6 hours he is also on Lovenox 40 mg subcu 

every 24 hours.  The patient remains on Precedex for now.  His calm and 

comfortable and responsive.





04/14/2021, the patient is having difficulty with hypoxemia and worsening 

shortness of breath.  Note that after being on high flow oxygen for quite some 

time, the patient started showing some signs of decompensated yesterday.  His 

pulse ox was dropping in the mid and low 80s and he was very slow in recovering.

 Based on that, he was switched to a BiPAP which is currently running at a BiPAP

pressure of 14/5 cm of water with an FiO2 of 40%.  Despite all this, he remains 

tachypneic.  The patient prior volume is above 1 L and his respiratory rate is 

in the mid 40s and is generating a very high risk ventilation even without the 

BiPAP.  He is quite tachypneic.  Pulse ox currently is in the low 70s.  He is on

Precedex.  He is very anxious.  His panicky.  He is restless. Precedex is 

running at micrograms per kilogram per minute.  As far as his treatment, the 

patient remains on IV Solu Medrol 60 mg every 6 hours.  He is also on Lovenox 40

mg subcu every 24 hours.  Chest x-ray from today is showing stable bilateral 

pulmonary infiltrates, essentially unchanged compared to yesterday.  Doppler of 

the lower extremities was done yesterday showed no evidence of DVT.  His 

inflammatory markers today is showing a d-dimer of 4.1 from yesterday.  LDH is 

01/04/2009, higher, CRP is 5.7, able.  Rest of the electrodes are stable, BUN is

40 with a creatinine of 0.8.  His white cell count is currently at 20 and 

hemoglobin is at 16.  His net fluid balance has been -2.6 L and the patient was 

given Lasix yesterday and the patient was Negative Fluid Balance.





Objective





- Vital Signs


Vital signs: 


                                   Vital Signs











Temp  98.7 F   04/14/21 04:00


 


Pulse  71   04/14/21 07:00


 


Resp  34 H  04/14/21 07:00


 


BP  124/88   04/14/21 07:00


 


Pulse Ox  78 L  04/14/21 07:00








                                 Intake & Output











 04/13/21 04/14/21 04/14/21





 18:59 06:59 18:59


 


Intake Total 474.428 247.998 93.96


 


Output Total 2800 600 0


 


Balance -2325.572 -352.002 93.96


 


Weight  102.3 kg 


 


Intake:   


 


   240 20


 


    Sodium Chloride 0.9% 1, 200 240 20





    000 ml @ 20 mls/hr IV .   





    Q24H SHAZIA Rx#:[id number]   


 


  Intake, IV Titration 144.428 7.998 73.96





  Amount   


 


    Dexmedetomidine/0.9% NaCl 94.428  





    (Pmx) 400 mcg In Empty   





    Bag 1 bag @ Titrate IV .   





    Q0M SHAZIA Rx#:[id number]   


 


    Dexmedetomidine/0.9% NaCl  7.998 73.96





    (Pmx) 400 mcg In Empty   





    Bag 1 bag @ Titrate IV .   





    Q0M SHAZIA Rx#:[id number]   


 


    Fluconazole in NaCl,Iso- 50  





    Osm 100 mg In Saline 1   





    50ml.bag @ 50 mls/hr IVPB   





    DAILY SHAZIA Rx#:[id number]   


 


  Oral 30  


 


  Tube Feeding 100  


 


Output:   


 


  Urine 2800 600 0


 


Other:   


 


  Voiding Method Urinal Urinal 


 


  # Voids  1 1














- Exam








GENERAL EXAM: Alert, pleasant 50-year-old gentleman, patient is in obvious 

respiratory failure on a BiPAP of 14/10, tachypneic, tachycardic, restless, 

using excessive muscle breathing and the patient's pulse is currently is in the 

mid 70s.


HEAD: Normocephalic.


EYES: Normal reaction of pupils, equal size.


NOSE: Clear with pink turbinates.


THROAT: No erythema or exudates.


NECK: No masses, no JVD.


CHEST: No chest wall deformity.


LUNGS: Equal air entry with basilar crackles.  Very much tachypneic and the 

patient is using excessive muscle breathing


CVS: S1 and S2 normal with no audible murmur, regular rhythm.


ABDOMEN: No hepatosplenomegaly, normal bowel sounds, no guarding or rigidity.


SPINE: No scoliosis or deformity


SKIN: No rashes


CENTRAL NERVOUS SYSTEM: No focal deficits, tone is normal in all 4 extremities.


EXTREMITIES: There is no peripheral edema.  No clubbing, no cyanosis.  

Peripheral pulses are intact.,  Restless on Precedex, able to follow some simple

commands.





- Labs


CBC & Chem 7: 


                                 04/14/21 03:17





                                 04/14/21 03:17


Labs: 


                  Abnormal Lab Results - Last 24 Hours (Table)











  04/13/21 04/13/21 04/14/21 Range/Units





  03:04 08:08 03:17 


 


WBC    20.1 H  (3.8-10.6)  k/uL


 


RBC    5.91 H  (4.30-5.90)  m/uL


 


Neutrophils #    18.9 H  (1.3-7.7)  k/uL


 


Lymphocytes #    0.5 L  (1.0-4.8)  k/uL


 


D-Dimer   4.19 H   (<0.60)  mg/L FEU


 


Sodium     (137-145)  mmol/L


 


BUN     (9-20)  mg/dL


 


Glucose     (74-99)  mg/dL


 


Ferritin  1047.8 H    (22.0-322.0)  ng/mL


 


ALT     (4-49)  U/L


 


Lactate Dehydrogenase     (313-618)  U/L


 


CK-MB (CK-2)     (0.0-2.4)  ng/mL


 


Total Protein     (6.3-8.2)  g/dL


 


Albumin     (3.5-5.0)  g/dL














  04/14/21 04/14/21 Range/Units





  03:17 03:17 


 


WBC    (3.8-10.6)  k/uL


 


RBC    (4.30-5.90)  m/uL


 


Neutrophils #    (1.3-7.7)  k/uL


 


Lymphocytes #    (1.0-4.8)  k/uL


 


D-Dimer    (<0.60)  mg/L FEU


 


Sodium  136 L   (137-145)  mmol/L


 


BUN  40 H   (9-20)  mg/dL


 


Glucose  124 H   (74-99)  mg/dL


 


Ferritin    (22.0-322.0)  ng/mL


 


ALT  79 H   (4-49)  U/L


 


Lactate Dehydrogenase  1409 H   (313-618)  U/L


 


CK-MB (CK-2)   3.3 H  (0.0-2.4)  ng/mL


 


Total Protein  5.7 L   (6.3-8.2)  g/dL


 


Albumin  2.9 L   (3.5-5.0)  g/dL














Assessment and Plan


Plan: 








1 Acute hypoxic respiratory failure secondary to CoVID 19 pneumonia.  Outside 

the window for Remdesivir.  Did receive tocilizumab and 1 unit of convalescent 

plasma.  Clinically, the patient is decompensating.  I think part of it is his 

increased anxiety and panic.  For that reason, I put him on Precedex.  Vlad villegas, I clearly think that the patient is decompensating despite his chest 

after being stable.  He is failing high flow oxygen at 60 L with an FiO2 of 95% 

and he was transitioned BiPAP is currently at a pressure of 14/10 cm of water.  

He is able to generate adequate tidal volumes.  Is quite hypoxic at this point 

in time.  A blood gas will be done.  The patient be kept on Precedex.  I'm also 

going to give him some morphine to take away some breathlessness and anxiety.  

I'm going to try to avoid intubation unless he clearly decompensates further.  

He is agreeable to that.  He is still awake.  Is following some commands as 

such, we decided to give the patient few more hours of monitoring prior to 

committing for intubation.  Chest x-ray was reviewed.  The blood gases will be 

ordered.  Inflammatory markers will be ordered.  The patient be also placed on 

therapeutic dose of Lovenox.  Doppler of the lower extremity was negative.  

Nevertheless, possibility of pulmonary embolism is still viable and I'm unable 

to do a CT angiogram because of his very borderline pulmonary status and his ramana

bility to get transferred to the radiology department.





2 History of chronic tobacco dependence





3 elevated inflammatory markers secondary to Covid 19 infection





4 leukocytosis





Plan: 


 


Continue BiPAP for respiratory support with very close monitoring and the 

potential for intubation within the next few hours if his condition doesn't 

improve.  He is currently on Precedex for sedation and morphine will be also 

added.


Start the patient on therapeutic dose of Lovenox


Encouraged again frequent position changes, including prone positioning if 

possible 


Midline catheter insertion today


Continue IV Solu-Medrol


Moderate inflammatory markers was noted 


Check pro calcitonin level are low


Start the patient on Lasix 40 mg IV  even yesterday and the patient is a 

negative fluid balance.  Will not give any additional dose of Lasix for today.


Obtain blood gas








Critical care time >30 minutes.


Time with Patient: Greater than 30

## 2021-04-14 NOTE — P.PN
Subjective


Progress Note Date: 04/14/21


Malcom Griffith is a 49 yo M with PMH of allergies who presented to the ED via EMS

with worsening malaise and shortness of breath. He states he developed a cough 

and sore throat about 2 weeks ago which has worsened since then. He complains of

fever chills and shortness of breath. On arrival he was febrile tachypenic and 

hypoxic SpO2 94% on 4 L O2. WBC 5.8, lymphopenia, , , COVID 

positive, CXR with coarse bilateral infiltrates.





[id number] maintained on 90% airflow, O2 sats in the low 90s.  Positive 

nonproductive cough .  Outside window for Remdesevir, continue on steroids, 

vitamins,lovenox. Chest pain, palpitations, complains of chronic back pain.  T-

max 100.2.  Preliminary blood cultures reporting no growth at 24 hours





04/05/2021 status post convalescent plasma, TOCI, maintaining O2 sats in the 90s

on 100% BiPAP.  Chest x-ray reporting stable diffuse bilateral infiltrates.  

Afebrile, T-max 99.2, WBC 14.7.  Hemoglobin 15.7, platelets 321.  BUN 41, 

creatinine 0.94. D-dimer 1.45, ferritin 1511.4, LDH 1025, CRP 54.1.





04/06/2021 alternating between BiPAP and 15 L high flow nasal cannula combined 

with nonrebreather, maintaining O2 sats in the high 80s.  Chest x-ray reporting 

diffuse bilateral infiltrates stable. T-max 99,WBC 14.  Hemoglobin 14.7, 

platelets 328 D-dimer 1.76, ferritin and CRP decreased.  BUN 39, creatinine 

0.91.








04/07/2021 maintained on 15 L-nasal cannula with  nonrebreather mask, IV 

steroids, bronchodilators,maintaining O2 sats in the high 80s.  Did not Require 

BiPAP during the night.  Feels better.  Nonproductive cough.  Complains of mild 

chest tightness with coughing. Chest x-ray reporting stable diffuse bilateral 

infiltrate.  Afebrile, WBC 13.6.  Hemoglobin 14.8, platelets 300. BUN 33, 

creatinine 0.86.  Blood sugars controlled. LDH elevated, 1127, CRP down to 17.6.










04/08/2021  continue on IV steroids, vitamins, maintaining O2 sats in the mid to

high 80s on 15 L nasal cannula plus nonrebreather.  Nonproductive cough.  

Reports she feels better.  Chest x-ray reporting no change in bilateral 

infiltrates.  Afebrile.  Creatinine 0.72.





04/09/2021 Continues on airflow high flow oxygen at 60 L/m and 90% FiO2 along 

with a nonrebreather mask, maintaining O2 sats in the high 80s.   Minimal cough.

 Complains of dry mouth.  He reports, continues to feel better.  Afebrile, WBC 

down to 12.2.  D-dimer, CRP decreased.  LDH increased ,1196.





04/12/2021 maintained on high flow nasal cannula 60 L in addition to 

nonrebreather maintaining better O2 sats  in the high 90s to 100%.  Chest x-ray 

reporting stable bilateral infiltrates .complains of exertional shortness of 

breath. Afebrile, T-max 99.5, WBC 19.  D-dimer, ferritin, LDH  decreased, CRP 

8.1.  BUN 25, creatinine 0.75.  Scheduled to have midline placed today








04/13/2021 rough night, Precedex initiated and patient placed on BiPAP.  This 

morning ,desating down into the 60s, on BiPAP.FiO2 adjusted, with reported O2 

sats improved into the 80s.Chest x-ray reporting stable  patchy bilateral Inf

iltrates.  Doppler reporting negative for DVT of bilateral lower extremities.  

Inflammatory markers elevated.








04/14/2021 difficulty oxygenating yesterday, converted over to BiPAP, currently 

on 100% FiO2 maintaining O2 sats of 63 to low 80s. Unstable to travel for CTA. 

Maintained on Lovenox . Continues on Precedex, covid cocktail, including IV 

Solu-Medrol.Chest x-ray reporting similar to prior exam.  Received Lasix 

yesterday, diuresed well with 24-hour I&O reflecting a negative fluid balance.  

BUN 40, creatinine 0.88.  Afebrile, WBC 20.  





Objective





- Vital Signs


Vital signs: 


                                   Vital Signs











Temp  98.7 F   04/14/21 04:00


 


Pulse  65   04/14/21 10:00


 


Resp  33 H  04/14/21 10:00


 


BP  116/83   04/14/21 10:00


 


Pulse Ox  84 L  04/14/21 10:00








                                 Intake & Output











 04/13/21 04/14/21 04/14/21





 18:59 06:59 18:59


 


Intake Total 474.428 247.998 166.844


 


Output Total 2800 600 0


 


Balance -2325.572 -352.002 166.844


 


Weight  102.3 kg 


 


Intake:   


 


   240 80


 


    Sodium Chloride 0.9% 1, 200 240 80





    000 ml @ 20 mls/hr IV .   





    Q24H SHAZIA Rx#:[id number]   


 


  Intake, IV Titration 144.428 7.998 86.844





  Amount   


 


    Dexmedetomidine/0.9% NaCl 94.428  





    (Pmx) 400 mcg In Empty   





    Bag 1 bag @ Titrate IV .   





    Q0M SHAZIA Rx#:[id number]   


 


    Dexmedetomidine/0.9% NaCl  7.998 84.744





    (Pmx) 400 mcg In Empty   





    Bag 1 bag @ Titrate IV .   





    Q0M SHAZIA Rx#:[id number]   


 


    Fluconazole in NaCl,Iso- 50  2.1





    Osm 100 mg In Saline 1   





    50ml.bag @ 50 mls/hr IVPB   





    DAILY SHAZIA Rx#:[id number]   


 


  Oral 30  


 


  Tube Feeding 100  


 


Output:   


 


  Urine 2800 600 0


 


Other:   


 


  Voiding Method Urinal Urinal Urinal


 


  # Voids  1 1














- Exam


Limited exam secondary to covid, to movement exposure, deferred to intensivist





General: Sitting up in bed, wearing BiPAP, respiratory effort increased


Lungs: Tachypneic 


CV: Regular rate and rhythm











- Labs


CBC & Chem 7: 


                                 04/14/21 03:17





                                 04/14/21 03:17


Labs: 


                  Abnormal Lab Results - Last 24 Hours (Table)











  04/13/21 04/14/21 04/14/21 Range/Units





  03:04 03:17 03:17 


 


WBC   20.1 H   (3.8-10.6)  k/uL


 


RBC   5.91 H   (4.30-5.90)  m/uL


 


Neutrophils #   18.9 H   (1.3-7.7)  k/uL


 


Lymphocytes #   0.5 L   (1.0-4.8)  k/uL


 


D-Dimer     (<0.60)  mg/L FEU


 


ABG pO2     ()  mmHg


 


ABG O2 Saturation     (94-97)  %


 


Sodium    136 L  (137-145)  mmol/L


 


BUN    40 H  (9-20)  mg/dL


 


Glucose    124 H  (74-99)  mg/dL


 


Ferritin  1047.8 H    (22.0-322.0)  ng/mL


 


ALT    79 H  (4-49)  U/L


 


Lactate Dehydrogenase    1409 H  (313-618)  U/L


 


CK-MB (CK-2)     (0.0-2.4)  ng/mL


 


Total Protein    5.7 L  (6.3-8.2)  g/dL


 


Albumin    2.9 L  (3.5-5.0)  g/dL














  04/14/21 04/14/21 04/14/21 Range/Units





  03:17 08:13 09:30 


 


WBC     (3.8-10.6)  k/uL


 


RBC     (4.30-5.90)  m/uL


 


Neutrophils #     (1.3-7.7)  k/uL


 


Lymphocytes #     (1.0-4.8)  k/uL


 


D-Dimer    3.62 H  (<0.60)  mg/L FEU


 


ABG pO2   45 L*   ()  mmHg


 


ABG O2 Saturation   76.1 L   (94-97)  %


 


Sodium     (137-145)  mmol/L


 


BUN     (9-20)  mg/dL


 


Glucose     (74-99)  mg/dL


 


Ferritin     (22.0-322.0)  ng/mL


 


ALT     (4-49)  U/L


 


Lactate Dehydrogenase     (313-618)  U/L


 


CK-MB (CK-2)  3.3 H    (0.0-2.4)  ng/mL


 


Total Protein     (6.3-8.2)  g/dL


 


Albumin     (3.5-5.0)  g/dL














Assessment and Plan


Assessment: 


Severe sepsis secondary to Covid pneumonia, beyond window for Remdesevir





Acute hypoxic respiratory failure secondary to the above, worsening





Gastroesophageal reflux disease





Chronic low back pain





Former nicotine dependence




















Plan: Continue on current medication regime ,monitoring and symptomatic nicolás

atment.worsening with potential intubation as per pulmonary .ICU management as 

per pulmonary/intensivist.Covid regimen. Prognosis guarded.

















The impression and plan of care has been dictated as directed.





:


I performed a history and examination of this patient,  discussed the same with 

the dictator.  I agree with the dictator's note ,documented as a scribe.  Any 

additional findings or plans will be noted.

## 2021-04-14 NOTE — XR
EXAMINATION TYPE: XR chest 1V portable

 

DATE OF EXAM: 4/14/2021

 

COMPARISON: Chest x-ray 4/13/2021

 

HISTORY: Shortness of breath

 

TECHNIQUE: Single frontal view of the chest is obtained.

 

FINDINGS:  There is some apical pleural thickening on the right as on prior. Bibasilar airspace disea
se is present, interstitium is prominent. Cardiac mediastinal silhouette shows no interval change. No
 evident pneumothorax or pleural effusion. There are overlying leads. Patient is rotated.

 

IMPRESSION:  Findings similar to prior exam, correlate for pneumonia, edema

## 2021-04-15 LAB
ALBUMIN SERPL-MCNC: 3 G/DL (ref 3.5–5)
ALP SERPL-CCNC: 85 U/L (ref 38–126)
ALT SERPL-CCNC: 128 U/L (ref 4–49)
ANION GAP SERPL CALC-SCNC: 7 MMOL/L
AST SERPL-CCNC: 51 U/L (ref 17–59)
BASOPHILS # BLD AUTO: 0 K/UL (ref 0–0.2)
BASOPHILS NFR BLD AUTO: 0 %
BUN SERPL-SCNC: 50 MG/DL (ref 9–20)
CALCIUM SPEC-MCNC: 8.8 MG/DL (ref 8.4–10.2)
CHLORIDE SERPL-SCNC: 106 MMOL/L (ref 98–107)
CO2 SERPL-SCNC: 25 MMOL/L (ref 22–30)
EOSINOPHIL # BLD AUTO: 0.1 K/UL (ref 0–0.7)
EOSINOPHIL NFR BLD AUTO: 0 %
ERYTHROCYTE [DISTWIDTH] IN BLOOD BY AUTOMATED COUNT: 5.72 M/UL (ref 4.3–5.9)
ERYTHROCYTE [DISTWIDTH] IN BLOOD: 12.4 % (ref 11.5–15.5)
GLUCOSE SERPL-MCNC: 128 MG/DL (ref 74–99)
HCT VFR BLD AUTO: 48.1 % (ref 39–53)
HGB BLD-MCNC: 16.8 GM/DL (ref 13–17.5)
LDH SPEC-CCNC: 1344 U/L (ref 313–618)
LYMPHOCYTES # SPEC AUTO: 0.4 K/UL (ref 1–4.8)
LYMPHOCYTES NFR SPEC AUTO: 2 %
MAGNESIUM SPEC-SCNC: 2.7 MG/DL (ref 1.6–2.3)
MCH RBC QN AUTO: 29.3 PG (ref 25–35)
MCHC RBC AUTO-ENTMCNC: 34.9 G/DL (ref 31–37)
MCV RBC AUTO: 84 FL (ref 80–100)
MONOCYTES # BLD AUTO: 0.6 K/UL (ref 0–1)
MONOCYTES NFR BLD AUTO: 3 %
NEUTROPHILS # BLD AUTO: 17.3 K/UL (ref 1.3–7.7)
NEUTROPHILS NFR BLD AUTO: 94 %
PLATELET # BLD AUTO: 278 K/UL (ref 150–450)
POTASSIUM SERPL-SCNC: 4.9 MMOL/L (ref 3.5–5.1)
PROT SERPL-MCNC: 5.7 G/DL (ref 6.3–8.2)
SODIUM SERPL-SCNC: 138 MMOL/L (ref 137–145)
TRIGL SERPL-MCNC: 379 MG/DL (ref ?–150)
WBC # BLD AUTO: 18.4 K/UL (ref 3.8–10.6)

## 2021-04-15 PROCEDURE — 02HV33Z INSERTION OF INFUSION DEVICE INTO SUPERIOR VENA CAVA, PERCUTANEOUS APPROACH: ICD-10-PCS

## 2021-04-15 PROCEDURE — 3E0436Z INTRODUCTION OF NUTRITIONAL SUBSTANCE INTO CENTRAL VEIN, PERCUTANEOUS APPROACH: ICD-10-PCS

## 2021-04-15 RX ADMIN — Medication SCH: at 10:26

## 2021-04-15 RX ADMIN — DEXMEDETOMIDINE HYDROCHLORIDE SCH MLS/HR: 4 INJECTION, SOLUTION INTRAVENOUS at 20:10

## 2021-04-15 RX ADMIN — OXYCODONE HYDROCHLORIDE AND ACETAMINOPHEN SCH: 500 TABLET ORAL at 10:25

## 2021-04-15 RX ADMIN — MORPHINE SULFATE PRN MG: 4 INJECTION, SOLUTION INTRAMUSCULAR; INTRAVENOUS at 17:02

## 2021-04-15 RX ADMIN — MORPHINE SULFATE PRN MG: 4 INJECTION, SOLUTION INTRAMUSCULAR; INTRAVENOUS at 10:47

## 2021-04-15 RX ADMIN — PANTOPRAZOLE SODIUM SCH MG: 40 INJECTION, POWDER, FOR SOLUTION INTRAVENOUS at 10:30

## 2021-04-15 RX ADMIN — METHYLPREDNISOLONE SODIUM SUCCINATE SCH MG: 125 INJECTION, POWDER, FOR SOLUTION INTRAMUSCULAR; INTRAVENOUS at 18:49

## 2021-04-15 RX ADMIN — Medication SCH: at 10:25

## 2021-04-15 RX ADMIN — DEXMEDETOMIDINE HYDROCHLORIDE SCH MLS/HR: 4 INJECTION, SOLUTION INTRAVENOUS at 03:31

## 2021-04-15 RX ADMIN — METHYLPREDNISOLONE SODIUM SUCCINATE SCH MG: 125 INJECTION, POWDER, FOR SOLUTION INTRAMUSCULAR; INTRAVENOUS at 01:21

## 2021-04-15 RX ADMIN — ALBUTEROL SULFATE PRN PUFF: 90 AEROSOL, METERED RESPIRATORY (INHALATION) at 15:55

## 2021-04-15 RX ADMIN — ENOXAPARIN SODIUM SCH MG: 100 INJECTION SUBCUTANEOUS at 21:08

## 2021-04-15 RX ADMIN — CEFAZOLIN SCH MLS/HR: 330 INJECTION, POWDER, FOR SOLUTION INTRAMUSCULAR; INTRAVENOUS at 18:46

## 2021-04-15 RX ADMIN — METHYLPREDNISOLONE SODIUM SUCCINATE SCH MG: 125 INJECTION, POWDER, FOR SOLUTION INTRAMUSCULAR; INTRAVENOUS at 12:05

## 2021-04-15 RX ADMIN — TAMSULOSIN HYDROCHLORIDE SCH: 0.4 CAPSULE ORAL at 10:25

## 2021-04-15 RX ADMIN — DEXMEDETOMIDINE HYDROCHLORIDE SCH MLS/HR: 4 INJECTION, SOLUTION INTRAVENOUS at 14:49

## 2021-04-15 RX ADMIN — DEXMEDETOMIDINE HYDROCHLORIDE SCH MLS/HR: 4 INJECTION, SOLUTION INTRAVENOUS at 08:44

## 2021-04-15 RX ADMIN — FLUCONAZOLE IN SODIUM CHLORIDE SCH MLS/HR: 2 INJECTION, SOLUTION INTRAVENOUS at 10:30

## 2021-04-15 RX ADMIN — ENOXAPARIN SODIUM SCH MG: 100 INJECTION SUBCUTANEOUS at 10:30

## 2021-04-15 RX ADMIN — METHYLPREDNISOLONE SODIUM SUCCINATE SCH MG: 125 INJECTION, POWDER, FOR SOLUTION INTRAMUSCULAR; INTRAVENOUS at 07:28

## 2021-04-15 RX ADMIN — ALBUTEROL SULFATE PRN PUFF: 90 AEROSOL, METERED RESPIRATORY (INHALATION) at 07:56

## 2021-04-15 NOTE — XR
EXAMINATION TYPE: XR chest 1V portable

 

DATE OF EXAM: 4/15/2021

 

COMPARISON: Prior chest x-ray 4/14/2021

 

HISTORY: Shortness of breath

 

TECHNIQUE: Single frontal view of the chest is obtained.

 

FINDINGS:  There may be some slight improved aeration at the right lung base. No pneumothorax or pleu
ral effusion. Cardiac mediastinal silhouette is stable.

 

IMPRESSION:  There may be some slight improved aeration at the right lung base.

## 2021-04-15 NOTE — P.PN
Subjective


Progress Note Date: 04/15/21


Malcom Griffith is a 49 yo M with PMH of allergies who presented to the ED via EMS

with worsening malaise and shortness of breath. He states he developed a cough 

and sore throat about 2 weeks ago which has worsened since then. He complains of

fever chills and shortness of breath. On arrival he was febrile tachypenic and 

hypoxic SpO2 94% on 4 L O2. WBC 5.8, lymphopenia, , , COVID 

positive, CXR with coarse bilateral infiltrates.





[id number] maintained on 90% airflow, O2 sats in the low 90s.  Positive 

nonproductive cough .  Outside window for Remdesevir, continue on steroids, 

vitamins,lovenox. Chest pain, palpitations, complains of chronic back pain.  T-

max 100.2.  Preliminary blood cultures reporting no growth at 24 hours





04/05/2021 status post convalescent plasma, TOCI, maintaining O2 sats in the 90s

on 100% BiPAP.  Chest x-ray reporting stable diffuse bilateral infiltrates.  

Afebrile, T-max 99.2, WBC 14.7.  Hemoglobin 15.7, platelets 321.  BUN 41, 

creatinine 0.94. D-dimer 1.45, ferritin 1511.4, LDH 1025, CRP 54.1.





04/06/2021 alternating between BiPAP and 15 L high flow nasal cannula combined 

with nonrebreather, maintaining O2 sats in the high 80s.  Chest x-ray reporting 

diffuse bilateral infiltrates stable. T-max 99,WBC 14.  Hemoglobin 14.7, 

platelets 328 D-dimer 1.76, ferritin and CRP decreased.  BUN 39, creatinine 

0.91.








04/07/2021 maintained on 15 L-nasal cannula with  nonrebreather mask, IV 

steroids, bronchodilators,maintaining O2 sats in the high 80s.  Did not Require 

BiPAP during the night.  Feels better.  Nonproductive cough.  Complains of mild 

chest tightness with coughing. Chest x-ray reporting stable diffuse bilateral 

infiltrate.  Afebrile, WBC 13.6.  Hemoglobin 14.8, platelets 300. BUN 33, 

creatinine 0.86.  Blood sugars controlled. LDH elevated, 1127, CRP down to 17.6.










04/08/2021  continue on IV steroids, vitamins, maintaining O2 sats in the mid to

high 80s on 15 L nasal cannula plus nonrebreather.  Nonproductive cough.  

Reports she feels better.  Chest x-ray reporting no change in bilateral 

infiltrates.  Afebrile.  Creatinine 0.72.





04/09/2021 Continues on airflow high flow oxygen at 60 L/m and 90% FiO2 along 

with a nonrebreather mask, maintaining O2 sats in the high 80s.   Minimal cough.

 Complains of dry mouth.  He reports, continues to feel better.  Afebrile, WBC 

down to 12.2.  D-dimer, CRP decreased.  LDH increased ,1196.





04/12/2021 maintained on high flow nasal cannula 60 L in addition to 

nonrebreather maintaining better O2 sats  in the high 90s to 100%.  Chest x-ray 

reporting stable bilateral infiltrates .complains of exertional shortness of 

breath. Afebrile, T-max 99.5, WBC 19.  D-dimer, ferritin, LDH  decreased, CRP 

8.1.  BUN 25, creatinine 0.75.  Scheduled to have midline placed today








04/13/2021 rough night, Precedex initiated and patient placed on BiPAP.  This 

morning ,desating down into the 60s, on BiPAP.FiO2 adjusted, with reported O2 

sats improved into the 80s.Chest x-ray reporting stable  patchy bilateral Inf

iltrates.  Doppler reporting negative for DVT of bilateral lower extremities.  

Inflammatory markers elevated.








04/14/2021 difficulty oxygenating yesterday, converted over to BiPAP, currently 

on 100% FiO2 maintaining O2 sats of 63 to low 80s. Unstable to travel for CTA. 

Maintained on Lovenox . Continues on Precedex, covid cocktail, including IV 

Solu-Medrol.Chest x-ray reporting similar to prior exam.  Received Lasix 

yesterday, diuresed well with 24-hour I&O reflecting a negative fluid balance.  

BUN 40, creatinine 0.88.  Afebrile, WBC 20. 








04/15/2021 anxiety significantly improved on Precedex and morphine.  Maintaining

O2 sats in the high 80s on 100% BiPAP pressure 16/12.  Chest x-ray reporting 

some slight improved aeration at the right lung base.  





Therapeutic dosed Lovenox; unstable to travel for CTA to rule out PE .Afebrile, 

WBC 18.4.  Diuresing and remains in a negative fluid balance.  BUN 50, 

creatinine 0.99.





Objective





- Vital Signs


Vital signs: 


                                   Vital Signs











Temp  98.6 F   04/15/21 12:00


 


Pulse  49 L  04/15/21 12:00


 


Resp  15   04/15/21 12:00


 


BP  122/83   04/15/21 12:00


 


Pulse Ox  90 L  04/15/21 12:00








                                 Intake & Output











 04/14/21 04/15/21 04/15/21





 18:59 06:59 18:59


 


Intake Total 553.144 432.797 273.394


 


Output Total 700 740 405


 


Balance -146.856 -307.203 -131.606


 


Weight  99.6 kg 99.6 kg


 


Intake:   


 


   220 180


 


    Fluconazole in NaCl,Iso-   100





    Osm 100 mg In Saline 1   





    50ml.bag @ 50 mls/hr IVPB   





    DAILY SHAZIA Rx#:[id number]   


 


    Sodium Chloride 0.9% 1, 260 220 80





    000 ml @ 20 mls/hr IV .   





    Q24H SHAZIA Rx#:[id number]   


 


  Intake, IV Titration 293.144 92.797 93.394





  Amount   


 


    Dexmedetomidine/0.9% NaCl 184.744 92.797 





    (Pmx) 400 mcg In Empty   





    Bag 1 bag @ Titrate IV .   





    Q0M SHAZIA Rx#:[id number]   


 


    Dexmedetomidine/0.9% NaCl   93.394





    (Pmx) 400 mcg In Empty   





    Bag 1 bag @ Titrate IV .   





    Q0M SHAZIA Rx#:[id number]   


 


    Fluconazole in NaCl,Iso- 8.4  





    Osm 100 mg In Saline 1   





    50ml.bag @ 50 mls/hr IVPB   





    DAILY SHAZIA Rx#:[id number]   


 


    Sodium Chloride 0.9% 1, 100  





    000 ml @ 20 mls/hr IV .   





    Q24H SHAZIA Rx#:[id number]   


 


  Oral  120 


 


Output:   


 


  Urine 700 740 405


 


Other:   


 


  Voiding Method Urinal Urinal Indwelling Catheter


 


  # Voids 1  














- Exam


Limited exam secondary to covid, full exam deferred to intensivist





General: Sitting up in bed, wearing BiPAP


Lungs: Bibasilar crackles, tachypneic


CV: Regular rate and rhythm











- Labs


CBC & Chem 7: 


                                 04/15/21 05:23





                                 04/15/21 05:23


Labs: 


                  Abnormal Lab Results - Last 24 Hours (Table)











  04/14/21 04/15/21 04/15/21 Range/Units





  03:17 05:23 05:23 


 


WBC   18.4 H   (3.8-10.6)  k/uL


 


Neutrophils #   17.3 H   (1.3-7.7)  k/uL


 


Lymphocytes #   0.4 L   (1.0-4.8)  k/uL


 


D-Dimer     (<0.60)  mg/L FEU


 


BUN    50 H  (9-20)  mg/dL


 


Glucose    128 H  (74-99)  mg/dL


 


Magnesium     (1.6-2.3)  mg/dL


 


Ferritin  1138.2 H    (22.0-322.0)  ng/mL


 


Total Bilirubin    1.8 H  (0.2-1.3)  mg/dL


 


ALT    128 H  (4-49)  U/L


 


Lactate Dehydrogenase     (313-618)  U/L


 


Total Protein    5.7 L  (6.3-8.2)  g/dL


 


Albumin    3.0 L  (3.5-5.0)  g/dL


 


Triglycerides     (<150)  mg/dL














  04/15/21 04/15/21 04/15/21 Range/Units





  05:23 08:16 08:16 


 


WBC     (3.8-10.6)  k/uL


 


Neutrophils #     (1.3-7.7)  k/uL


 


Lymphocytes #     (1.0-4.8)  k/uL


 


D-Dimer   2.72 H   (<0.60)  mg/L FEU


 


BUN     (9-20)  mg/dL


 


Glucose     (74-99)  mg/dL


 


Magnesium  2.7 H    (1.6-2.3)  mg/dL


 


Ferritin     (22.0-322.0)  ng/mL


 


Total Bilirubin     (0.2-1.3)  mg/dL


 


ALT     (4-49)  U/L


 


Lactate Dehydrogenase    1344 H  (313-618)  U/L


 


Total Protein     (6.3-8.2)  g/dL


 


Albumin     (3.5-5.0)  g/dL


 


Triglycerides  379 H    (<150)  mg/dL














Assessment and Plan


Assessment: 


Severe sepsis secondary to Covid pneumonia, beyond window for Remdesevir.  

Possible PE, and stable for transfer for CTA, maintained on therapeutic dosed 

Lovenox.





Acute hypoxic respiratory failure secondary to the above, worsening





Gastroesophageal reflux disease





Chronic low back pain





Former nicotine dependence




















Plan: Continue on current medication regime ,monitoring and symptomatic 

treatment.ICU management as per pulmonary/intensivist.Covid regimen. Prognosis 

guarded.

















The impression and plan of care has been dictated as directed.





:


I performed a history and examination of this patient,  discussed the same with 

the dictator.  I agree with the dictator's note ,documented as a scribe.  Any 

additional findings or plans will be noted.

## 2021-04-15 NOTE — P.PN
Subjective


Progress Note Date: 04/15/21








CoVID 19 pneumonia





50-year-old male, with history of shortness of breath, who presents to the 

emergency department on March 31, a little after 9:00 PM.  He apparently was 

brought in by EMS.  I saw the patient in the emergency room, in room 33.  He was

on O2 several liters by nasal cannula and not receiving any IV fluids.  He has 

been sick for about 11 or 12 days.  He apparently just tested positive today.  

His symptoms included shortness of breath, weakness, fatigue, low saturations, 

and fever.  As an outpatient, he was on Tylenol, a Z-Alex, Claritin, Zofran, 

Flomax, and a Medrol Dosepak.  He does have ALLERGIES to penicillin antibiotics,

and sulfa antibiotics.  The patient was quite fatigued when I saw him in the 

emergency room.  He could barely open his eyes.  He was not demonstrating any 

signs or symptoms of respiratory compromise, and did not have any conversational

dyspnea or accessory muscle use.  His white count was 4.2, hemoglobin 16, 

hematocrit 46.2, and platelet count 201,000.  PT/INR PTT were normal.  Sodium 

136, potassium 4.4, chlorides 104, CO2 24, anion gap 8, BUN 17, and creatinine 

0.94.  AST was 96, FiO2 104, LDH was 723, and C-reactive protein was 133.9.  

There was no d-dimer ordered.  A chest x-ray did reveal bilateral infiltrates.





The patient is seen today 04/02/2021 in follow-up on the regular medical floor. 

He is currently awake, alert, in no acute distress.  He is still requiring AirVo

high flow nasal cannula at 60 L and 90% FiO2 to maintain O2 saturations in the 

low 90s.  He is currently afebrile.  Hemodynamically stable.  Blood cultures 

reveal no growth.  He remains on Lovenox, Decadron, vitamin supplements. 





The patient is seen today 04/03/2021 in follow-up on the regular medical floor. 

He is currently resting on his right side in bed.  Still quite short of breath 

with minimal exertion.  Still requiring AirVo high flow oxygen at 60 L and 90% 

FiO2 to maintain O2 saturations in the 90s.  Chest x-ray continues to show 

persistent interstitial opacities improving and the left but now worsening on 

the right mid and lower lungs.  D-dimer 0.91.  .  C-reactive protein 2O2.

 He remains on dexamethasone, Lovenox, vitamin supplements.





The patient is seen today 04/04/2021 in follow-up in the intensive care unit.  

He was transferred here last evening after developing increasing shortness of 

breath and increasing oxygen requirements.  He is currently resting fairly 

comfortably in bed.  He remains on BiPAP 14/7 and 100% FiO2 to maintain O2 

saturations in the low 90s.  Chest x-ray continues to show persistent right 

greater than left diffuse interstitial opacities with interval decrease in the 

right lung base and mild increase on the left.  He did receive Tocilizumab and 

convalescent plasma yesterday.  He remains on Lovenox, IV Solu-Medrol, vitamin 

supplements.  White count 10.1.  Hemoglobin 15.5.  Lymphocytes 0.4.  D-dimer 

1.27.  Sodium 140.  Potassium 4.6.  Creatinine 0.82.  LDH 10,025.  C-reactive 

protein 171.





Progress note dated 04/05/2021.





The patient was admitted to the hospital on March 31.  He came to the ICU on 

April 3.  He is currently unfortunately on BiPAP at 14/7, and 100%.  He is 

getting saline at 75 mL an hour.  He is quite short of breath.  The patient did 

receive both convalescent plasma, and TOCI on April 3.  The patient doesn't feel

like he is getting better.  He doesn't feel any worse but just doesn't feel like

he is improving.  He does realize, that in the end, he may end up on the 

mechanical ventilator.  White count 14.7, hemoglobin 15.7, hematocrit 46.0, 

platelet count 321,000.  D-dimer is 1.45, and fibrinogen is 673.  Sodium 143, 

potassium 4.3, chlorides 109, CO2 26, anion gap 8, BUN 41, and creatinine 0.94. 

Most recent LDH is 1025.  The most recent C-reactive protein is 64.1.  Chest x-

ray continues to show bilateral diffuse interstitial infiltrates.





Progress note dated 04/06/2021.





50-year-old male, admitted to the hospital on March 31.  He came in to the ICU 

on April 3.  Currently, the patient's on BiPAP with IPAP of 14, EPAP of 7.  FiO2

is 100%.  When he is not on BiPAP, the patient's on 15 L high flow nasal O2.  In

addition, when he is on the high flow nasal oxygen, he also has a nonrebreather 

mask on as well.  The patient's getting saline at 75 mL an hour.  White count 

14, hemoglobin 14.7, hematocrit 43.7, and platelet count 328,000.  D-dimer 1.76.

 Sodium 142, potassium 4.8, chlorides 112, CO2 26, anion gap 4, BUN and 

creatinine were 39 and 0.91.  C-reactive protein is down to 29.6.  Chest x-ray 

shows diffuse bilateral infiltrates.





The patient is seen today 04/07/2021 in follow-up in the intensive care unit.  

He is currently sitting up in bed.  Awake and alert.  Currently on 15 L high 

flow nasal cannula along with 100% nonrebreather mask.  He did not utilize the 

BiPAP last evening.  He has 0.9 normal saline at 75 ML's per hour.  Still 

dyspneic with minimal exertion.  Chest x-ray continues to show diffuse mixed 

interstitial and alveolar infiltrates.  Stable compared to previous.  Blood 

culture reveals no growth.  White count 13.6.  Hemoglobin 14.8.  Lymphocytes 

0.4.  Sodium 142.  Potassium 4.2.  Creatinine 0.86.  LDH 1127, C-reactive 

protein 17.6.  He remains on bronchodilators, IV Solu-Medrol, Lovenox, vitamin 

supplements.





The patient is seen today 04/08/2021 in follow-up in the intensive care unit.  

He is currently resting fairly comfortably in bed.  No significant events 

overnight.  He is maintaining O2 saturations in the upper 80s and low 90s on 15 

L high flow nasal cannula and addition to a nonrebreather mask.  0.9 normal 

saline at 75 mL per hour.  Chest x-ray shows no changes and bilateral 

infiltrates.  White count 13.3.  Hemoglobin 15.3.  Lymphocytes 0.4.  D-dimer 

3.36.  Sodium 138.  Potassium 4.3.  Creatinine 0.72.  C-reactive protein 14.4.  

He remains on Lovenox, IV Solu-Medrol, vitamin supplements.





The patient is seen today 04/09/2021 follow-up in the intensive care unit.  He 

is currently sitting up in bed.  Awake and alert.  In mild respiratory distress.

 Still quite dyspneic with minimal exertion.  Still with oxygen desaturations 

with minimal exertion.  He is currently on airflow high flow oxygen at 60 L/m 

and 90% FiO2 along with a nonrebreather mask to maintain O2 saturations 85-86%. 

Morning blood gases revealed a PaO2 of 49, pCO2 30 and a pH of 7.48.  0.9 normal

sinus 75 ML's per hour.  He has received a unit of convalescent plasma.  Blood 

cultures reveal no growth.  White count 12.2.  Hemoglobin 15.2.  Lymphocytes 

0.3.  D-dimer 2.84.  Sodium 138.  Potassium 4.3.  Creatinine 0.69.  LDH 1196.  

C-reactive protein 10.4.  Remains on IV Solu-Medrol, rhonchi dilators, Lovenox, 

vitamin supplements.





The patient is seen today 04/10/2021 follow-up in the intensive care unit.  He 

is currently sitting up in bed.  Awake and alert in no acute distress.  Still 

quite dyspneic with minimal exertion in conversation.  He remains on AirVo high 

flow oxygen at 60 L and 95% FiO2 along with a nonrebreather mask with O2 

saturations in the mid 80s.  He did receive convalescent plasma and tocilizumab 

back on 04/03/2021.  He has a 0.9 normal saline at 75 mL per hour.  Yesterday's 

blood gases revealed a pO2 of 49, pCO2 of 30 and a pH of 7.5.  Chest x-ray 

revealing slight improvement of bibasilar infiltrates.  Blood cultures reveal no

growth.  White count 12.7.  Hemoglobin 15.1.  D-dimer 2.47.  Sodium 135.  

Potassium 4.3.  Creatinine 0.74.  LDH 1213.  C-reactive protein 8.6.  Continued 

on Lovenox, IV Solu-Medrol, vitamin supplements.





The patient is seen today 04/11/2021 in follow-up in the intensive care unit.  

He remains sitting up in bed.  Awake and alert.  He is continued on AirVo high 

flow oxygen at 60 L and 95% FiO2 along with a nonrebreather mask.  O2 

saturations in the 80s.  He is 0.9 normal saline at 75 ML's per hour.  He 

received convalescent plasma 1.  Blood cultures reveal no growth.  White count 

11.1.  Hemoglobin 15.3.  Lymphocytes 0.4.  Sodium 134.  Potassium 4.4.  

Creatinine 0.71.  LDH 1172.  C-reactive protein 7.5.  He remains on Lovenox, 

Solu-Medrol, vitamin supplements. 





On 04/12/2021, I'm seeing this patient in follow-up.  The patient was Hospital 

as forCOVID 19  pneumonia and the patient progressive hypoxic respiratory 

failure.  The patient was initially admitted on 04/04/2021 and the patient is 

currently in the intensive care unit requiring high flow oxygen.  He is 

currently on high flow oxygen at 60 L with an FiO2 of 95% in addition to a 

nonrebreather facemask.  He is awake and alert.  He is short of breath with 

limited amount of activity and he easily desaturates.  No significant chest 

pain.  Cough with deep breathing.  The patient has already received convalescent

plasma.  The patient is currently on IV Solu-Medrol 60 mg every 6 hours of this 

in addition to multivitamins. He was also treated with Tocilizumab.  LDH level 

from yesterday was 1157 and the CRP was 8.1.  Chest x-ray still unchanged with 

diffuse bilateral pulmonary infiltrates.  This patient otherwise has been in 

good state of health.  No chronic illnesses otherwise.  He is being monitored 

very closely here in the intensive care unit due to concerns of progressive 

respiratory failure requiring intubation mechanical ventilation.  Blood cultures

of been negative thus far.





On 04/13/2021, the patient is being seen for a follow-up.  This is a case of 

Coumadin related pneumonia with hypoxic respiratory failure.  The patient was on

high flow oxygen yesterday and he was on 60 L with an FiO2 of 95%.  He was also 

using the nonrebreather facemask in addition to the high flow oxygen.  Was doing

some activity and movement yesterday, the patient desaturated down to the 60s 

and he was hard to recovered.  At this time, the patient became quite restless 

and agitated.  He was placed on a BiPAP briefly which made him more panicky 

uncomfortable.  Overnight, he was also started on Precedex which is currently ru

nning at 0.2 mcg/kg per minute.  Ultimately, he was taken off the BiPAP and the 

patient is currently back on high flow oxygen at 60 L with a 93% in addition to 

100% nonrebreather facemask.  His current pulse ox is order of 76-82 percent.  

The chest x-ray from today is showing bilateral lower lobe pulmonary infiltrates

and compared to yesterday's chest x-ray, there is no major interval change.  Fin

dings and essentially stable.  Meanwhile, his blood work from today is showing a

white cell count of 15.8, he does have lymphopenia, renal function is stable, 

LDH level is 1-15 and the CRP is 7.4.  I also check a pro-calcitonin level on 

him yesterday and the level came back low at 0.08.  Meanwhile, the patient is 

currently on Solu-Medrol 60 mg every 6 hours he is also on Lovenox 40 mg subcu 

every 24 hours.  The patient remains on Precedex for now.  His calm and 

comfortable and responsive.





04/14/2021, the patient is having difficulty with hypoxemia and worsening 

shortness of breath.  Note that after being on high flow oxygen for quite some 

time, the patient started showing some signs of decompensated yesterday.  His 

pulse ox was dropping in the mid and low 80s and he was very slow in recovering.

 Based on that, he was switched to a BiPAP which is currently running at a BiPAP

pressure of 14/5 cm of water with an FiO2 of 100%.  Despite all this, he remains

tachypneic.  The patient prior volume is above 1 L and his respiratory rate is 

in the mid 40s and is generating a very high risk ventilation even without the 

BiPAP.  He is quite tachypneic.  Pulse ox currently is in the low 70s.  He is on

Precedex.  He is very anxious.  His panicky.  He is restless. Precedex is 

running at micrograms per kilogram per minute.  As far as his treatment, the 

patient remains on IV Solu Medrol 60 mg every 6 hours.  He is also on Lovenox 40

mg subcu every 24 hours.  Chest x-ray from today is showing stable bilateral 

pulmonary infiltrates, essentially unchanged compared to yesterday.  Doppler of 

the lower extremities was done yesterday showed no evidence of DVT.  His 

inflammatory markers today is showing a d-dimer of 4.1 from yesterday.  LDH is 

01/04/2009, higher, CRP is 5.7, able.  Rest of the electrodes are stable, BUN is

40 with a creatinine of 0.8.  His white cell count is currently at 20 and 

hemoglobin is at 16.  His net fluid balance has been -2.6 L and the patient was 

given Lasix yesterday and the patient was Negative Fluid Balance.





04/15/2021 the patient is being seen for a follow-up.  He obviously not 

intubated yesterday as the patient calmed down considerably with utilization of 

Precedex and morphine.  Nevertheless, his sister status remains borderline and 

currently is still on a BiPAP at a pressure of 16/12 and FiO2 of 100% and the 

patient is also on Precedex and points Precedex at 0.7 mcg/kg per minute and 

morphine as needed.  He is not was essentially uneventful.  His current pulse ox

is around 89%.  The easily desaturates.  He had several events yesterday with 

his pulse ox dropped to lower levels and is taking them quite some time until he

recovers probably in the order of 30 minutes to 45 minutes.  During this time 

the patient becomes quite panicky restless and short of breath.  We have still 

avoided intubation on this patient and were continuing the supportive care with 

noninvasive positive pressure ventilation.  The chest x-ray remains stable 

without any interval change and there is some lower lobe at the pulmonary 

infiltrates.  The patient's white cell count is 18.4 with a hemoglobin of 16.8. 

His electrolytes are normal, renal function is showing a stable creatinine of 

0.9, rest of the inflammatory markers are still pending for now.  Fluid balance 

has been in the order of -2.6 L for yesterday and the patient is headed towards 

more negative fluid balance for today.  The patient remains on IV Solu-Medrol 60

mg every 6 hours.  He is on Lovenox therapeutic dose of 100 mg by mouth every 12

hours therapeutic dose as well as utilized as the patient was getting 

progressively more hypoxic and pulmonary embolism was a constellation.  The 

patient was unable to undergo a CT angiogram because of his very limited 

pulmonary reserve and borderline respiratory status.  Doppler of the lower 

extremities were obviously negative.  No signs of bleeding and was going to 

continue the therapy goes of Lovenox for another 24 hours.  D-dimer from today 

is pending for now.  Clinically, he is resting comfortably in bed.  His night 

was otherwise uneventful.  A Devlin catheter was also inserted yesterday is 

limited his mobility.





Objective





- Vital Signs


Vital signs: 


                                   Vital Signs











Temp  98.8 F   04/15/21 04:00


 


Pulse  46 L  04/15/21 06:00


 


Resp  18   04/15/21 06:00


 


BP  136/86   04/15/21 06:00


 


Pulse Ox  87 L  04/15/21 06:00








                                 Intake & Output











 04/14/21 04/14/21 04/15/21





 06:59 18:59 06:59


 


Intake Total 247.998 553.144 412.797


 


Output Total 600 700 620


 


Balance -352.002 -146.856 -207.203


 


Weight 102.3 kg  99.6 kg


 


Intake:   


 


   260 200


 


    Sodium Chloride 0.9% 1, 240 260 200





    000 ml @ 20 mls/hr IV .   





    Q24H SHAZIA Rx#:[id number]   


 


  Intake, IV Titration 7.998 293.144 92.797





  Amount   


 


    Dexmedetomidine/0.9% NaCl 7.998 184.744 92.797





    (Pmx) 400 mcg In Empty   





    Bag 1 bag @ Titrate IV .   





    Q0M SHAZIA Rx#:[id number]   


 


    Fluconazole in NaCl,Iso-  8.4 





    Osm 100 mg In Saline 1   





    50ml.bag @ 50 mls/hr IVPB   





    DAILY SHAZIA Rx#:[id number]   


 


    Sodium Chloride 0.9% 1,  100 





    000 ml @ 20 mls/hr IV .   





    Q24H SHAZIA Rx#:[id number]   


 


  Oral   120


 


Output:   


 


  Urine 600 700 620


 


Other:   


 


  Voiding Method Urinal Urinal Urinal


 


  # Voids 1 1 














- Exam








GENERAL EXAM: Alert, pleasant 50-year-old gentleman, patient is in obvious 

respiratory failure on a BiPAP of 16/12, tachypneic, tachycardic, restless, 

using excessive muscle breathing and the patient's pulse is currently is in the 

mid 70s.


HEAD: Normocephalic.


EYES: Normal reaction of pupils, equal size.


NOSE: Clear with pink turbinates.


THROAT: No erythema or exudates.


NECK: No masses, no JVD.


CHEST: No chest wall deformity.


LUNGS: Equal air entry with basilar crackles.  Very much tachypneic and the 

patient is using excessive muscle breathing


CVS: S1 and S2 normal with no audible murmur, regular rhythm.


ABDOMEN: No hepatosplenomegaly, normal bowel sounds, no guarding or rigidity.


SPINE: No scoliosis or deformity


SKIN: No rashes


CENTRAL NERVOUS SYSTEM: No focal deficits, tone is normal in all 4 extremities.


EXTREMITIES: There is no peripheral edema.  No clubbing, no cyanosis.  

Peripheral pulses are intact., on Precedex, able to follow some simple commands.

 His appropriate and he follows commands and answers questions adequately.  

Precedex is running at 0.7.  No focal neurological deficit at this point in 

time.  He is alert and oriented 3.





- Labs


CBC & Chem 7: 


                                 04/15/21 05:23





                                 04/15/21 05:23


Labs: 


                  Abnormal Lab Results - Last 24 Hours (Table)











  04/14/21 04/14/21 04/14/21 Range/Units





  03:17 08:13 09:30 


 


WBC     (3.8-10.6)  k/uL


 


Neutrophils #     (1.3-7.7)  k/uL


 


Lymphocytes #     (1.0-4.8)  k/uL


 


D-Dimer    3.62 H  (<0.60)  mg/L FEU


 


ABG pO2   45 L*   ()  mmHg


 


ABG O2 Saturation   76.1 L   (94-97)  %


 


BUN     (9-20)  mg/dL


 


Glucose     (74-99)  mg/dL


 


Ferritin  1138.2 H    (22.0-322.0)  ng/mL


 


Total Bilirubin     (0.2-1.3)  mg/dL


 


ALT     (4-49)  U/L


 


Total Protein     (6.3-8.2)  g/dL


 


Albumin     (3.5-5.0)  g/dL














  04/15/21 04/15/21 Range/Units





  05:23 05:23 


 


WBC  18.4 H   (3.8-10.6)  k/uL


 


Neutrophils #  17.3 H   (1.3-7.7)  k/uL


 


Lymphocytes #  0.4 L   (1.0-4.8)  k/uL


 


D-Dimer    (<0.60)  mg/L FEU


 


ABG pO2    ()  mmHg


 


ABG O2 Saturation    (94-97)  %


 


BUN   50 H  (9-20)  mg/dL


 


Glucose   128 H  (74-99)  mg/dL


 


Ferritin    (22.0-322.0)  ng/mL


 


Total Bilirubin   1.8 H  (0.2-1.3)  mg/dL


 


ALT   128 H  (4-49)  U/L


 


Total Protein   5.7 L  (6.3-8.2)  g/dL


 


Albumin   3.0 L  (3.5-5.0)  g/dL














Assessment and Plan


Plan: 








1 Acute hypoxic respiratory failure secondary to CoVID 19 pneumonia.  Outside 

the window for Remdesivir.  Did receive tocilizumab and 1 unit of convalescent 

plasma.  Clinically, the patient  was decompensating.  Based on that the patient

was placed on noninvasive positive pressure ventilation the patient continues to

be on BiPAP at a pressure of 16/12 an FiO2 of 100%.  He is on Precedex.  He is 

also on morphine.  Doing well.  His night was uneventful.  Current pulse ox is 

around 89%.  As mentioned, the patient has significant respiratory insufficiency

and the patient desaturates to lower levels with limited amount of mobility.  A 

Devlin catheter was inserted.  Due to concern of pulmonary embolism, the patient 

was given therapeutic dose of Lovenox.  D-dimer is being monitored.  Unable to 

do a CT angiogram as the patient is quite unstable and he cannot be moved down 

to the CAT scan department because of significant desaturation with activity.  

The patient meanwhile remains hemodynamically stable.  He is alert and awake.  

He is on Precedex.  He is on morphine.  Inflammatory markers are being 

monitored.  Chest x-ray findings are stable.  I think he is a severe ARDS post 

Covid 19 pneumonia.





2 History of chronic tobacco dependence





3 elevated inflammatory markers secondary to Covid 19 infection





4 leukocytosis





Plan: 


 


Continue BiPAP for respiratory support, currently on BiPAP of 16/12 cm of water 

and FiO2 is at 100%


currently on Precedex for sedation and morphine 


 therapeutic dose of Lovenox 100 mg of Lovenox every 12 hours 


Encouraged again frequent position changes, including prone positioning if 

possible 


Midline catheter insertion and the patient will be started on TPN for 

nutritional support 


Continue IV Solu-Medrol


Moderate inflammatory markers was noted  a pending from today


Check pro calcitonin level  was low


continue the rest of the supportive care.  Condition is critical.  Very high 

risk for further respiratory insufficiency and failure requiring intubation 

mechanical ventilation.  





Critical care time >30 minutes.


Time with Patient: Greater than 30

## 2021-04-15 NOTE — IR
EXAMINATION TYPE: IR cvc insert >=5 years

 

DATE OF EXAM: 4/15/2021

 

COMPARISON: NONE

 

HISTORY: Infection, needs long-term intravenous access for total parenteral nutrition

 

FINDINGS: Maximal barrier technique was utilized.  Hand hygiene obtained with soap and water and alco
hol-based hand rub. Attempts at exchanging the existing intravenous catheter in the left upper extrem
ity were unsuccessful, catheter could not be centrally advanced, the indwelling catheter was prepped 
and draped and lidocaine used for local anesthesia. Skin nick made with a scalpel. The 4 [place] sheat
h was advanced over an 018 inch wire, catheter tailored to length could not be advanced. The site was
 aborted. Hemostasis achieved. No significant bleeding.

 

The skin overlying the left basilic vein was localized with ultrasound and noted to be compressible a
nd patent by ultrasound.  An ultrasound image was obtained and submitted on patient's chart. Sterile 
technique utilized with the ultrasound machine. The skin overlying was prepped and draped and Lidocai
ne used for local anesthesia.  A skin nick was made with a scalpel.  Access was gained to the vein un
jose direct ultrasound guidance with a 21-gauge needle and a 0.018 inch wire was advanced.  Access sit
e was dilated with a peel-away sheath and the catheter tailored to length.  Catheter advanced central
ly and a post procedure chest x-ray verified placement with tip at the superior vena cava.  Catheter 
was fixed to the skin and a sterile dressing placed.  Hemostasis achieved and the catheter was aspira
jaison and flushed with sterile saline.  The patient remained in stable condition.

 

IMPRESSION: STATUS POST ULTRASOUND GUIDED PICC LINE PLACEMENT, READY FOR USE.  THIS PROCEDURE WAS PER
FORMED BY THE UNDERSIGNED.

## 2021-04-15 NOTE — XR
EXAMINATION TYPE: XR chest 1V confirm line [facility]

 

DATE OF EXAM: 4/15/2021

 

CLINICAL HISTORY: PICC line placement.  

 

TECHNIQUE: Single AP portable supine view of the chest is obtained.

 

COMPARISON: Chest x-ray from earlier today and older studies

 

FINDINGS:  The new left-sided PICC line terminates in SVC. Increased opacities right greater than lef
t lung base is redemonstrated consistent with known covid-19 infection. Cardiac silhouette size is st
able and upper limits of normal. No pneumothorax seen. Osseous structures are intact.

 

IMPRESSION: As above.

## 2021-04-16 LAB
ALBUMIN SERPL-MCNC: 2.9 G/DL (ref 3.5–5)
ALP SERPL-CCNC: 95 U/L (ref 38–126)
ALT SERPL-CCNC: 115 U/L (ref 4–49)
ANION GAP SERPL CALC-SCNC: 7 MMOL/L
AST SERPL-CCNC: 39 U/L (ref 17–59)
BASOPHILS # BLD AUTO: 0 K/UL (ref 0–0.2)
BASOPHILS NFR BLD AUTO: 0 %
BUN SERPL-SCNC: 47 MG/DL (ref 9–20)
CALCIUM SPEC-MCNC: 8.9 MG/DL (ref 8.4–10.2)
CHLORIDE SERPL-SCNC: 109 MMOL/L (ref 98–107)
CO2 SERPL-SCNC: 25 MMOL/L (ref 22–30)
EOSINOPHIL # BLD AUTO: 0.1 K/UL (ref 0–0.7)
EOSINOPHIL NFR BLD AUTO: 0 %
ERYTHROCYTE [DISTWIDTH] IN BLOOD BY AUTOMATED COUNT: 5.8 M/UL (ref 4.3–5.9)
ERYTHROCYTE [DISTWIDTH] IN BLOOD: 12.6 % (ref 11.5–15.5)
GLUCOSE BLD-MCNC: 137 MG/DL (ref 75–99)
GLUCOSE BLD-MCNC: 138 MG/DL (ref 75–99)
GLUCOSE BLD-MCNC: 156 MG/DL (ref 75–99)
GLUCOSE BLD-MCNC: 166 MG/DL (ref 75–99)
GLUCOSE SERPL-MCNC: 150 MG/DL (ref 74–99)
HCT VFR BLD AUTO: 50.1 % (ref 39–53)
HGB BLD-MCNC: 16.9 GM/DL (ref 13–17.5)
LDH SPEC-CCNC: 1371 U/L (ref 313–618)
LYMPHOCYTES # SPEC AUTO: 0.4 K/UL (ref 1–4.8)
LYMPHOCYTES NFR SPEC AUTO: 2 %
MAGNESIUM SPEC-SCNC: 2.6 MG/DL (ref 1.6–2.3)
MCH RBC QN AUTO: 29.1 PG (ref 25–35)
MCHC RBC AUTO-ENTMCNC: 33.7 G/DL (ref 31–37)
MCV RBC AUTO: 86.4 FL (ref 80–100)
MONOCYTES # BLD AUTO: 0.5 K/UL (ref 0–1)
MONOCYTES NFR BLD AUTO: 3 %
NEUTROPHILS # BLD AUTO: 20.3 K/UL (ref 1.3–7.7)
NEUTROPHILS NFR BLD AUTO: 95 %
PLATELET # BLD AUTO: 270 K/UL (ref 150–450)
POTASSIUM SERPL-SCNC: 5.1 MMOL/L (ref 3.5–5.1)
PROT SERPL-MCNC: 5.6 G/DL (ref 6.3–8.2)
SODIUM SERPL-SCNC: 141 MMOL/L (ref 137–145)
WBC # BLD AUTO: 21.3 K/UL (ref 3.8–10.6)

## 2021-04-16 RX ADMIN — INSULIN ASPART SCH UNIT: 100 INJECTION, SOLUTION INTRAVENOUS; SUBCUTANEOUS at 13:30

## 2021-04-16 RX ADMIN — PANTOPRAZOLE SODIUM SCH MG: 40 INJECTION, POWDER, FOR SOLUTION INTRAVENOUS at 09:48

## 2021-04-16 RX ADMIN — METHYLPREDNISOLONE SODIUM SUCCINATE SCH MG: 125 INJECTION, POWDER, FOR SOLUTION INTRAMUSCULAR; INTRAVENOUS at 13:11

## 2021-04-16 RX ADMIN — METHYLPREDNISOLONE SODIUM SUCCINATE SCH MG: 125 INJECTION, POWDER, FOR SOLUTION INTRAMUSCULAR; INTRAVENOUS at 17:08

## 2021-04-16 RX ADMIN — CEFAZOLIN SCH: 330 INJECTION, POWDER, FOR SOLUTION INTRAMUSCULAR; INTRAVENOUS at 16:49

## 2021-04-16 RX ADMIN — Medication SCH: at 09:31

## 2021-04-16 RX ADMIN — DEXMEDETOMIDINE HYDROCHLORIDE SCH MLS/HR: 4 INJECTION, SOLUTION INTRAVENOUS at 05:59

## 2021-04-16 RX ADMIN — METHYLPREDNISOLONE SODIUM SUCCINATE SCH MG: 125 INJECTION, POWDER, FOR SOLUTION INTRAMUSCULAR; INTRAVENOUS at 06:04

## 2021-04-16 RX ADMIN — OXYCODONE HYDROCHLORIDE AND ACETAMINOPHEN SCH: 500 TABLET ORAL at 09:31

## 2021-04-16 RX ADMIN — MORPHINE SULFATE PRN MG: 4 INJECTION, SOLUTION INTRAMUSCULAR; INTRAVENOUS at 17:10

## 2021-04-16 RX ADMIN — INSULIN ASPART SCH UNIT: 100 INJECTION, SOLUTION INTRAVENOUS; SUBCUTANEOUS at 19:00

## 2021-04-16 RX ADMIN — MORPHINE SULFATE PRN MG: 4 INJECTION, SOLUTION INTRAMUSCULAR; INTRAVENOUS at 10:46

## 2021-04-16 RX ADMIN — DEXMEDETOMIDINE HYDROCHLORIDE SCH MLS/HR: 4 INJECTION, SOLUTION INTRAVENOUS at 01:05

## 2021-04-16 RX ADMIN — MORPHINE SULFATE PRN MG: 4 INJECTION, SOLUTION INTRAMUSCULAR; INTRAVENOUS at 20:13

## 2021-04-16 RX ADMIN — INSULIN ASPART SCH: 100 INJECTION, SOLUTION INTRAVENOUS; SUBCUTANEOUS at 09:19

## 2021-04-16 RX ADMIN — ALBUTEROL SULFATE PRN PUFF: 90 AEROSOL, METERED RESPIRATORY (INHALATION) at 11:27

## 2021-04-16 RX ADMIN — TAMSULOSIN HYDROCHLORIDE SCH: 0.4 CAPSULE ORAL at 09:31

## 2021-04-16 RX ADMIN — ENOXAPARIN SODIUM SCH MG: 60 INJECTION SUBCUTANEOUS at 21:59

## 2021-04-16 RX ADMIN — ALBUTEROL SULFATE PRN PUFF: 90 AEROSOL, METERED RESPIRATORY (INHALATION) at 15:54

## 2021-04-16 RX ADMIN — DEXMEDETOMIDINE HYDROCHLORIDE SCH MLS/HR: 4 INJECTION, SOLUTION INTRAVENOUS at 17:00

## 2021-04-16 RX ADMIN — FLUCONAZOLE IN SODIUM CHLORIDE SCH MLS/HR: 2 INJECTION, SOLUTION INTRAVENOUS at 09:48

## 2021-04-16 RX ADMIN — ENOXAPARIN SODIUM SCH MG: 60 INJECTION SUBCUTANEOUS at 09:50

## 2021-04-16 RX ADMIN — DEXMEDETOMIDINE HYDROCHLORIDE SCH MLS/HR: 4 INJECTION, SOLUTION INTRAVENOUS at 10:22

## 2021-04-16 RX ADMIN — INSULIN ASPART SCH UNIT: 100 INJECTION, SOLUTION INTRAVENOUS; SUBCUTANEOUS at 01:06

## 2021-04-16 RX ADMIN — METHYLPREDNISOLONE SODIUM SUCCINATE SCH MG: 125 INJECTION, POWDER, FOR SOLUTION INTRAMUSCULAR; INTRAVENOUS at 00:07

## 2021-04-16 NOTE — P.PN
Subjective


Progress Note Date: 04/16/21








CoVID 19 pneumonia





50-year-old male, with history of shortness of breath, who presents to the 

emergency department on March 31, a little after 9:00 PM.  He apparently was 

brought in by EMS.  I saw the patient in the emergency room, in room 33.  He was

on O2 several liters by nasal cannula and not receiving any IV fluids.  He has 

been sick for about 11 or 12 days.  He apparently just tested positive today.  

His symptoms included shortness of breath, weakness, fatigue, low saturations, 

and fever.  As an outpatient, he was on Tylenol, a Z-Alex, Claritin, Zofran, 

Flomax, and a Medrol Dosepak.  He does have ALLERGIES to penicillin antibiotics,

and sulfa antibiotics.  The patient was quite fatigued when I saw him in the 

emergency room.  He could barely open his eyes.  He was not demonstrating any 

signs or symptoms of respiratory compromise, and did not have any conversational

dyspnea or accessory muscle use.  His white count was 4.2, hemoglobin 16, 

hematocrit 46.2, and platelet count 201,000.  PT/INR PTT were normal.  Sodium 

136, potassium 4.4, chlorides 104, CO2 24, anion gap 8, BUN 17, and creatinine 

0.94.  AST was 96, FiO2 104, LDH was 723, and C-reactive protein was 133.9.  

There was no d-dimer ordered.  A chest x-ray did reveal bilateral infiltrates.





The patient is seen today 04/02/2021 in follow-up on the regular medical floor. 

He is currently awake, alert, in no acute distress.  He is still requiring AirVo

high flow nasal cannula at 60 L and 90% FiO2 to maintain O2 saturations in the 

low 90s.  He is currently afebrile.  Hemodynamically stable.  Blood cultures 

reveal no growth.  He remains on Lovenox, Decadron, vitamin supplements. 





The patient is seen today 04/03/2021 in follow-up on the regular medical floor. 

He is currently resting on his right side in bed.  Still quite short of breath 

with minimal exertion.  Still requiring AirVo high flow oxygen at 60 L and 90% 

FiO2 to maintain O2 saturations in the 90s.  Chest x-ray continues to show 

persistent interstitial opacities improving and the left but now worsening on 

the right mid and lower lungs.  D-dimer 0.91.  .  C-reactive protein 2O2.

 He remains on dexamethasone, Lovenox, vitamin supplements.





The patient is seen today 04/04/2021 in follow-up in the intensive care unit.  

He was transferred here last evening after developing increasing shortness of 

breath and increasing oxygen requirements.  He is currently resting fairly 

comfortably in bed.  He remains on BiPAP 14/7 and 100% FiO2 to maintain O2 

saturations in the low 90s.  Chest x-ray continues to show persistent right 

greater than left diffuse interstitial opacities with interval decrease in the 

right lung base and mild increase on the left.  He did receive Tocilizumab and 

convalescent plasma yesterday.  He remains on Lovenox, IV Solu-Medrol, vitamin 

supplements.  White count 10.1.  Hemoglobin 15.5.  Lymphocytes 0.4.  D-dimer 

1.27.  Sodium 140.  Potassium 4.6.  Creatinine 0.82.  LDH 10,025.  C-reactive 

protein 171.





Progress note dated 04/05/2021.





The patient was admitted to the hospital on March 31.  He came to the ICU on 

April 3.  He is currently unfortunately on BiPAP at 14/7, and 100%.  He is 

getting saline at 75 mL an hour.  He is quite short of breath.  The patient did 

receive both convalescent plasma, and TOCI on April 3.  The patient doesn't feel

like he is getting better.  He doesn't feel any worse but just doesn't feel like

he is improving.  He does realize, that in the end, he may end up on the 

mechanical ventilator.  White count 14.7, hemoglobin 15.7, hematocrit 46.0, 

platelet count 321,000.  D-dimer is 1.45, and fibrinogen is 673.  Sodium 143, 

potassium 4.3, chlorides 109, CO2 26, anion gap 8, BUN 41, and creatinine 0.94. 

Most recent LDH is 1025.  The most recent C-reactive protein is 64.1.  Chest x-

ray continues to show bilateral diffuse interstitial infiltrates.





Progress note dated 04/06/2021.





50-year-old male, admitted to the hospital on March 31.  He came in to the ICU 

on April 3.  Currently, the patient's on BiPAP with IPAP of 14, EPAP of 7.  FiO2

is 100%.  When he is not on BiPAP, the patient's on 15 L high flow nasal O2.  In

addition, when he is on the high flow nasal oxygen, he also has a nonrebreather 

mask on as well.  The patient's getting saline at 75 mL an hour.  White count 

14, hemoglobin 14.7, hematocrit 43.7, and platelet count 328,000.  D-dimer 1.76.

 Sodium 142, potassium 4.8, chlorides 112, CO2 26, anion gap 4, BUN and 

creatinine were 39 and 0.91.  C-reactive protein is down to 29.6.  Chest x-ray 

shows diffuse bilateral infiltrates.





The patient is seen today 04/07/2021 in follow-up in the intensive care unit.  

He is currently sitting up in bed.  Awake and alert.  Currently on 15 L high 

flow nasal cannula along with 100% nonrebreather mask.  He did not utilize the 

BiPAP last evening.  He has 0.9 normal saline at 75 ML's per hour.  Still 

dyspneic with minimal exertion.  Chest x-ray continues to show diffuse mixed 

interstitial and alveolar infiltrates.  Stable compared to previous.  Blood 

culture reveals no growth.  White count 13.6.  Hemoglobin 14.8.  Lymphocytes 

0.4.  Sodium 142.  Potassium 4.2.  Creatinine 0.86.  LDH 1127, C-reactive 

protein 17.6.  He remains on bronchodilators, IV Solu-Medrol, Lovenox, vitamin 

supplements.





The patient is seen today 04/08/2021 in follow-up in the intensive care unit.  

He is currently resting fairly comfortably in bed.  No significant events 

overnight.  He is maintaining O2 saturations in the upper 80s and low 90s on 15 

L high flow nasal cannula and addition to a nonrebreather mask.  0.9 normal 

saline at 75 mL per hour.  Chest x-ray shows no changes and bilateral 

infiltrates.  White count 13.3.  Hemoglobin 15.3.  Lymphocytes 0.4.  D-dimer 

3.36.  Sodium 138.  Potassium 4.3.  Creatinine 0.72.  C-reactive protein 14.4.  

He remains on Lovenox, IV Solu-Medrol, vitamin supplements.





The patient is seen today 04/09/2021 follow-up in the intensive care unit.  He 

is currently sitting up in bed.  Awake and alert.  In mild respiratory distress.

 Still quite dyspneic with minimal exertion.  Still with oxygen desaturations 

with minimal exertion.  He is currently on airflow high flow oxygen at 60 L/m 

and 90% FiO2 along with a nonrebreather mask to maintain O2 saturations 85-86%. 

Morning blood gases revealed a PaO2 of 49, pCO2 30 and a pH of 7.48.  0.9 normal

sinus 75 ML's per hour.  He has received a unit of convalescent plasma.  Blood 

cultures reveal no growth.  White count 12.2.  Hemoglobin 15.2.  Lymphocytes 

0.3.  D-dimer 2.84.  Sodium 138.  Potassium 4.3.  Creatinine 0.69.  LDH 1196.  

C-reactive protein 10.4.  Remains on IV Solu-Medrol, rhonchi dilators, Lovenox, 

vitamin supplements.





The patient is seen today 04/10/2021 follow-up in the intensive care unit.  He 

is currently sitting up in bed.  Awake and alert in no acute distress.  Still 

quite dyspneic with minimal exertion in conversation.  He remains on AirVo high 

flow oxygen at 60 L and 95% FiO2 along with a nonrebreather mask with O2 

saturations in the mid 80s.  He did receive convalescent plasma and tocilizumab 

back on 04/03/2021.  He has a 0.9 normal saline at 75 mL per hour.  Yesterday's 

blood gases revealed a pO2 of 49, pCO2 of 30 and a pH of 7.5.  Chest x-ray 

revealing slight improvement of bibasilar infiltrates.  Blood cultures reveal no

growth.  White count 12.7.  Hemoglobin 15.1.  D-dimer 2.47.  Sodium 135.  

Potassium 4.3.  Creatinine 0.74.  LDH 1213.  C-reactive protein 8.6.  Continued 

on Lovenox, IV Solu-Medrol, vitamin supplements.





The patient is seen today 04/11/2021 in follow-up in the intensive care unit.  

He remains sitting up in bed.  Awake and alert.  He is continued on AirVo high 

flow oxygen at 60 L and 95% FiO2 along with a nonrebreather mask.  O2 

saturations in the 80s.  He is 0.9 normal saline at 75 ML's per hour.  He 

received convalescent plasma 1.  Blood cultures reveal no growth.  White count 

11.1.  Hemoglobin 15.3.  Lymphocytes 0.4.  Sodium 134.  Potassium 4.4.  

Creatinine 0.71.  LDH 1172.  C-reactive protein 7.5.  He remains on Lovenox, 

Solu-Medrol, vitamin supplements. 





On 04/12/2021, I'm seeing this patient in follow-up.  The patient was Hospital 

as forCOVID 19  pneumonia and the patient progressive hypoxic respiratory 

failure.  The patient was initially admitted on 04/04/2021 and the patient is 

currently in the intensive care unit requiring high flow oxygen.  He is 

currently on high flow oxygen at 60 L with an FiO2 of 95% in addition to a 

nonrebreather facemask.  He is awake and alert.  He is short of breath with 

limited amount of activity and he easily desaturates.  No significant chest 

pain.  Cough with deep breathing.  The patient has already received convalescent

plasma.  The patient is currently on IV Solu-Medrol 60 mg every 6 hours of this 

in addition to multivitamins. He was also treated with Tocilizumab.  LDH level 

from yesterday was 1157 and the CRP was 8.1.  Chest x-ray still unchanged with 

diffuse bilateral pulmonary infiltrates.  This patient otherwise has been in 

good state of health.  No chronic illnesses otherwise.  He is being monitored 

very closely here in the intensive care unit due to concerns of progressive 

respiratory failure requiring intubation mechanical ventilation.  Blood cultures

of been negative thus far.





On 04/13/2021, the patient is being seen for a follow-up.  This is a case of 

Coumadin related pneumonia with hypoxic respiratory failure.  The patient was on

high flow oxygen yesterday and he was on 60 L with an FiO2 of 95%.  He was also 

using the nonrebreather facemask in addition to the high flow oxygen.  Was doing

some activity and movement yesterday, the patient desaturated down to the 60s 

and he was hard to recovered.  At this time, the patient became quite restless 

and agitated.  He was placed on a BiPAP briefly which made him more panicky 

uncomfortable.  Overnight, he was also started on Precedex which is currently ru

nning at 0.2 mcg/kg per minute.  Ultimately, he was taken off the BiPAP and the 

patient is currently back on high flow oxygen at 60 L with a 93% in addition to 

100% nonrebreather facemask.  His current pulse ox is order of 76-82 percent.  

The chest x-ray from today is showing bilateral lower lobe pulmonary infiltrates

and compared to yesterday's chest x-ray, there is no major interval change.  Fin

dings and essentially stable.  Meanwhile, his blood work from today is showing a

white cell count of 15.8, he does have lymphopenia, renal function is stable, 

LDH level is 1-15 and the CRP is 7.4.  I also check a pro-calcitonin level on 

him yesterday and the level came back low at 0.08.  Meanwhile, the patient is 

currently on Solu-Medrol 60 mg every 6 hours he is also on Lovenox 40 mg subcu 

every 24 hours.  The patient remains on Precedex for now.  His calm and 

comfortable and responsive.





04/14/2021, the patient is having difficulty with hypoxemia and worsening 

shortness of breath.  Note that after being on high flow oxygen for quite some 

time, the patient started showing some signs of decompensated yesterday.  His 

pulse ox was dropping in the mid and low 80s and he was very slow in recovering.

 Based on that, he was switched to a BiPAP which is currently running at a BiPAP

pressure of 14/5 cm of water with an FiO2 of 100%.  Despite all this, he remains

tachypneic.  The patient prior volume is above 1 L and his respiratory rate is 

in the mid 40s and is generating a very high risk ventilation even without the 

BiPAP.  He is quite tachypneic.  Pulse ox currently is in the low 70s.  He is on

Precedex.  He is very anxious.  His panicky.  He is restless. Precedex is 

running at micrograms per kilogram per minute.  As far as his treatment, the 

patient remains on IV Solu Medrol 60 mg every 6 hours.  He is also on Lovenox 40

mg subcu every 24 hours.  Chest x-ray from today is showing stable bilateral 

pulmonary infiltrates, essentially unchanged compared to yesterday.  Doppler of 

the lower extremities was done yesterday showed no evidence of DVT.  His 

inflammatory markers today is showing a d-dimer of 4.1 from yesterday.  LDH is 

01/04/2009, higher, CRP is 5.7, able.  Rest of the electrodes are stable, BUN is

40 with a creatinine of 0.8.  His white cell count is currently at 20 and 

hemoglobin is at 16.  His net fluid balance has been -2.6 L and the patient was 

given Lasix yesterday and the patient was Negative Fluid Balance.





04/15/2021 the patient is being seen for a follow-up.  He obviously not 

intubated yesterday as the patient calmed down considerably with utilization of 

Precedex and morphine.  Nevertheless, his sister status remains borderline and 

currently is still on a BiPAP at a pressure of 16/12 and FiO2 of 100% and the 

patient is also on Precedex and points Precedex at 0.7 mcg/kg per minute and 

morphine as needed.  He is not was essentially uneventful.  His current pulse ox

is around 89%.  The easily desaturates.  He had several events yesterday with 

his pulse ox dropped to lower levels and is taking them quite some time until he

recovers probably in the order of 30 minutes to 45 minutes.  During this time 

the patient becomes quite panicky restless and short of breath.  We have still 

avoided intubation on this patient and were continuing the supportive care with 

noninvasive positive pressure ventilation.  The chest x-ray remains stable 

without any interval change and there is some lower lobe at the pulmonary 

infiltrates.  The patient's white cell count is 18.4 with a hemoglobin of 16.8. 

His electrolytes are normal, renal function is showing a stable creatinine of 

0.9, rest of the inflammatory markers are still pending for now.  Fluid balance 

has been in the order of -2.6 L for yesterday and the patient is headed towards 

more negative fluid balance for today.  The patient remains on IV Solu-Medrol 60

mg every 6 hours.  He is on Lovenox therapeutic dose of 100 mg by mouth every 12

hours therapeutic dose as well as utilized as the patient was getting 

progressively more hypoxic and pulmonary embolism was a constellation.  The 

patient was unable to undergo a CT angiogram because of his very limited 

pulmonary reserve and borderline respiratory status.  Doppler of the lower 

extremities were obviously negative.  No signs of bleeding and was going to 

continue the therapy goes of Lovenox for another 24 hours.  D-dimer from today 

is pending for now.  Clinically, he is resting comfortably in bed.  His night 

was otherwise uneventful.  A Devlin catheter was also inserted yesterday is 

limited his mobility.





04/16/2021, the patient remains on a BiPAP of 16/12 cm of water with an FiO2 of 

100%.  His current respiratory rate is in the low 30s.  He seems to be 

comfortable, he had very much dependent on a BiPAP and the patient has limited r

eserve and easily desaturates.  His been on BiPAP for the past 3 days for now.  

This is making his nose irritated in the mouth dry.  The chest x-ray from today 

still pending.  Yesterday's chest x-ray was reviewed and the findings were 

essentially stable.  To control his increased level of anxiety and synchrony 

with the noninvasive positive pressure ventilator, and without Precedex which is

currently running at 0.7 mcg/kg per minute and this has done significant 

improvement in terms of lowering his respiratory rate and taking control of his 

anxiety.  He is on TPN for nutritional support for now.  He has a PICC line in 

his left upper extremity.  In terms of therapy, he remains on Lovenox 1 mg every

every 12 hours.  He remains on IV Solu Medrol 60 mg IV every 6 hours.  

Inflammatory markers on today's evaluation showed a d-dimer of 1.77, his LDH 

level is 1371 and his CRP level is at 6.3.  His electrolytes are all within 

normal limits.  His fluid balance over the past 24 hours is negative for 54 mL 

and the patient was being diuresed with Lasix.  He does not have a lazy 

extending dose for now.  He was also given Diflucan regarding the possibility of

some oropharyngeal candidiasis.  IV fluids currently running at 20 mL an hour.  

TPN is currently running at 30 mL an hour and the patient currently has a Devlin 

catheter in place.





Objective





- Vital Signs


Vital signs: 


                                   Vital Signs











Temp  97.9 F   04/16/21 04:00


 


Pulse  51 L  04/16/21 07:00


 


Resp  23   04/16/21 07:00


 


BP  126/87   04/16/21 07:00


 


Pulse Ox  87 L  04/16/21 07:00








                                 Intake & Output











 04/15/21 04/16/21 04/16/21





 18:59 06:59 18:59


 


Intake Total 513.394 605.806 50


 


Output Total 805 965 50


 


Balance -291.606 -359.194 0


 


Weight 99.6 kg 98 kg 


 


Intake:   


 


   240 20


 


    Fluconazole in NaCl,Iso- 100  





    Osm 100 mg In Saline 1   





    50ml.bag @ 50 mls/hr IVPB   





    DAILY SHAZIA Rx#:[id number]   


 


    Sodium Chloride 0.9% 1, 220 240 20





    000 ml @ 20 mls/hr IV .   





    Q24H SHAZIA Rx#:[id number]   


 


  Intake, IV Titration 193.394 365.806 30





  Amount   


 


    Dexmedetomidine/0.9% NaCl 193.394 95.781 





    (Pmx) 400 mcg In Empty   





    Bag 1 bag @ Titrate IV .   





    [facility][id number]   


 


    Dexmedetomidine/0.9% NaCl  60.025 





    (Pmx) 400 mcg In Empty   





    Bag 1 bag @ Titrate IV .   





    [facility] Rx#:[id number]   


 


    Mvi, Adult No.4 with Vit  210 30





    K 10 ml Trace (Conc-1Ml/   





    Dose) 1 ml Sodium Acetate   





    30 meq Potassium   





    Chloride 20 meq Calcium   





    Gluconate 1 gm In Amino   





    Acids 5 %/Dextrose 20 % 1   





    ,000 ml @ 30 mls/hr IV .   





    Q24H [facility] Rx#:[id number]   


 


Output:   


 


  Urine 805 965 50


 


Other:   


 


  Voiding Method Indwelling Catheter Indwelling Catheter 


 


  # Voids  1 














- Exam








GENERAL EXAM: Alert, pleasant 50-year-old gentleman, patient is in obvious 

respiratory failure on a BiPAP of 16/12, tachypneic, tachycardic, restless, 

using excessive muscle breathing and the patient's pulse is currently is in the 

high 80s for now.  He is laying supine.  Unable to prone the patient.


HEAD: Normocephalic.


EYES: Normal reaction of pupils, equal size.


NOSE: Clear with pink turbinates.


THROAT: No erythema or exudates.


NECK: No masses, no JVD.


CHEST: No chest wall deformity.


LUNGS: Equal air entry with basilar crackles.  Very much tachypneic and the 

patient is using excessive muscle breathing


CVS: S1 and S2 normal with no audible murmur, regular rhythm.


ABDOMEN: No hepatosplenomegaly, normal bowel sounds, no guarding or rigidity.


SPINE: No scoliosis or deformity


SKIN: No rashes


CENTRAL NERVOUS SYSTEM: No focal deficits, tone is normal in all 4 extremities.


EXTREMITIES: There is no peripheral edema.  No clubbing, no cyanosis.  

Peripheral pulses are intact., on Precedex, able to follow some simple commands.

 His appropriate and he follows commands and answers questions adequately.  

Precedex is running at 0.7.  No focal neurological deficit at this point in 

time.  He is alert and oriented 3.





- Labs


CBC & Chem 7: 


                                 04/15/21 05:23





                                 04/16/21 05:01


Labs: 


                  Abnormal Lab Results - Last 24 Hours (Table)











  04/15/21 04/15/21 04/15/21 Range/Units





  05:23 08:16 08:16 


 


D-Dimer   2.72 H   (<0.60)  mg/L FEU


 


Chloride     ()  mmol/L


 


BUN     (9-20)  mg/dL


 


Glucose     (74-99)  mg/dL


 


POC Glucose (mg/dL)     (75-99)  mg/dL


 


Magnesium  2.7 H    (1.6-2.3)  mg/dL


 


Total Bilirubin     (0.2-1.3)  mg/dL


 


ALT     (4-49)  U/L


 


Lactate Dehydrogenase    1344 H  (313-618)  U/L


 


Total Protein     (6.3-8.2)  g/dL


 


Albumin     (3.5-5.0)  g/dL


 


Triglycerides  379 H    (<150)  mg/dL














  04/16/21 04/16/21 04/16/21 Range/Units





  00:02 05:01 05:01 


 


D-Dimer   1.77 H   (<0.60)  mg/L FEU


 


Chloride    109 H  ()  mmol/L


 


BUN    47 H  (9-20)  mg/dL


 


Glucose    150 H  (74-99)  mg/dL


 


POC Glucose (mg/dL)  166 H    (75-99)  mg/dL


 


Magnesium    2.6 H  (1.6-2.3)  mg/dL


 


Total Bilirubin    1.4 H  (0.2-1.3)  mg/dL


 


ALT    115 H  (4-49)  U/L


 


Lactate Dehydrogenase    1371 H  (313-618)  U/L


 


Total Protein    5.6 L  (6.3-8.2)  g/dL


 


Albumin    2.9 L  (3.5-5.0)  g/dL


 


Triglycerides     (<150)  mg/dL














  04/16/21 Range/Units





  05:50 


 


D-Dimer   (<0.60)  mg/L FEU


 


Chloride   ()  mmol/L


 


BUN   (9-20)  mg/dL


 


Glucose   (74-99)  mg/dL


 


POC Glucose (mg/dL)  137 H  (75-99)  mg/dL


 


Magnesium   (1.6-2.3)  mg/dL


 


Total Bilirubin   (0.2-1.3)  mg/dL


 


ALT   (4-49)  U/L


 


Lactate Dehydrogenase   (313-618)  U/L


 


Total Protein   (6.3-8.2)  g/dL


 


Albumin   (3.5-5.0)  g/dL


 


Triglycerides   (<150)  mg/dL














Assessment and Plan


Plan: 








1 Acute hypoxic respiratory failure secondary to CoVID 19 pneumonia.  Outside 

the window for Remdesivir.  Did receive tocilizumab and 1 unit of convalescent 

plasma.  Clinically, the patient  was decompensating.  Based on that the patient

was placed on noninvasive positive pressure ventilation the patient continues to

be on BiPAP at a pressure of 16/12 an FiO2 of 100%.  He is on Precedex.  He is 

also on morphine.   His overall pulmonary status is extremely borderline.  As 

stated earlier, he continues to desaturate with cough and and talk and her mov

ements.  As such, I do not see any role for weaning and he'll be kept on a BiPAP

at the same setting.  We'll repeat the chest x-ray.  D-dimer is low and the 

patient is currently on therapeutic dose of Lovenox.  He remains on IV Solu-

Medrol.  No much improvement.  In fact his been on BiPAP for the past 3 days and

I will say his condition has progressively gotten worse.  He is receiving TPN 

for nutritional support.  





2 History of chronic tobacco dependence





3 elevated inflammatory markers secondary to Covid 19 infection





4 leukocytosis





5 Diflucan for oropharyngeal candidiasis





Plan: 


 


Continue BiPAP for respiratory support, currently on BiPAP of 16/12 cm of water 

and FiO2 is at 100%


currently on Precedex for sedation and morphine 


therapeutic dose of Lovenox 100 mg of Lovenox every 12 hours , we'll cut down 

the Lovenox dose into half


Encouraged again frequent position changes, including prone positioning if 

possible 


Midline catheter insertion and the patient on TPN for nutritional support 


Continue IV Solu-Medrol


Chest x-ray is pending for now.  


Check pro calcitonin level  was low


continue the rest of the supportive care.  Condition is critical.  Very high 

risk for further respiratory insufficiency and failure requiring intubation 

mechanical ventilation.  





Critical care time >30 minutes.

## 2021-04-16 NOTE — XR
EXAMINATION TYPE: XR chest 1V portable

 

DATE OF EXAM: 4/16/2021

 

Comparison: 4/15/2021

 

Clinical History: 50-year-old male COVID 19

 

Findings:

Heart normal size. Left basilar opacity slightly increased. Interstitial and patchy changes at the ri
ght base may be slightly improved. However, there is a new trace 3 mm right apical pneumothorax. Left
 PICC tip at the mid to lower SVC level.

 

 

 

 

Impression:

 

1. New trace 3 mm right apical pneumothorax.  Findings called to 2[facility] and given to nurse Vyas at 8:
33am.

2. Right basilar infiltrates show slight improvement.

3. However, left basilar airspace disease slightly worsened.

## 2021-04-16 NOTE — P.PN
Subjective


Progress Note Date: 04/16/21


Malcom Griffith is a 51 yo M with PMH of allergies who presented to the ED via EMS

with worsening malaise and shortness of breath. He states he developed a cough 

and sore throat about 2 weeks ago which has worsened since then. He complains of

fever chills and shortness of breath. On arrival he was febrile tachypenic and 

hypoxic SpO2 94% on 4 L O2. WBC 5.8, lymphopenia, , , COVID 

positive, CXR with coarse bilateral infiltrates.





[id number] maintained on 90% airflow, O2 sats in the low 90s.  Positive 

nonproductive cough .  Outside window for Remdesevir, continue on steroids, 

vitamins,lovenox. Chest pain, palpitations, complains of chronic back pain.  T-

max 100.2.  Preliminary blood cultures reporting no growth at 24 hours





04/05/2021 status post convalescent plasma, TOCI, maintaining O2 sats in the 90s

on 100% BiPAP.  Chest x-ray reporting stable diffuse bilateral infiltrates.  

Afebrile, T-max 99.2, WBC 14.7.  Hemoglobin 15.7, platelets 321.  BUN 41, 

creatinine 0.94. D-dimer 1.45, ferritin 1511.4, LDH 1025, CRP 54.1.





04/06/2021 alternating between BiPAP and 15 L high flow nasal cannula combined 

with nonrebreather, maintaining O2 sats in the high 80s.  Chest x-ray reporting 

diffuse bilateral infiltrates stable. T-max 99,WBC 14.  Hemoglobin 14.7, 

platelets 328 D-dimer 1.76, ferritin and CRP decreased.  BUN 39, creatinine 

0.91.








04/07/2021 maintained on 15 L-nasal cannula with  nonrebreather mask, IV 

steroids, bronchodilators,maintaining O2 sats in the high 80s.  Did not Require 

BiPAP during the night.  Feels better.  Nonproductive cough.  Complains of mild 

chest tightness with coughing. Chest x-ray reporting stable diffuse bilateral 

infiltrate.  Afebrile, WBC 13.6.  Hemoglobin 14.8, platelets 300. BUN 33, 

creatinine 0.86.  Blood sugars controlled. LDH elevated, 1127, CRP down to 17.6.










04/08/2021  continue on IV steroids, vitamins, maintaining O2 sats in the mid to

high 80s on 15 L nasal cannula plus nonrebreather.  Nonproductive cough.  

Reports she feels better.  Chest x-ray reporting no change in bilateral 

infiltrates.  Afebrile.  Creatinine 0.72.





04/09/2021 Continues on airflow high flow oxygen at 60 L/m and 90% FiO2 along 

with a nonrebreather mask, maintaining O2 sats in the high 80s.   Minimal cough.

 Complains of dry mouth.  He reports, continues to feel better.  Afebrile, WBC 

down to 12.2.  D-dimer, CRP decreased.  LDH increased ,1196.





04/12/2021 maintained on high flow nasal cannula 60 L in addition to 

nonrebreather maintaining better O2 sats  in the high 90s to 100%.  Chest x-ray 

reporting stable bilateral infiltrates .complains of exertional shortness of 

breath. Afebrile, T-max 99.5, WBC 19.  D-dimer, ferritin, LDH  decreased, CRP 

8.1.  BUN 25, creatinine 0.75.  Scheduled to have midline placed today








04/13/2021 rough night, Precedex initiated and patient placed on BiPAP.  This 

morning ,desating down into the 60s, on BiPAP.FiO2 adjusted, with reported O2 

sats improved into the 80s.Chest x-ray reporting stable  patchy bilateral Inf

iltrates.  Doppler reporting negative for DVT of bilateral lower extremities.  

Inflammatory markers elevated.








04/14/2021 difficulty oxygenating yesterday, converted over to BiPAP, currently 

on 100% FiO2 maintaining O2 sats of 63 to low 80s. Unstable to travel for CTA. 

Maintained on Lovenox . Continues on Precedex, covid cocktail, including IV 

Solu-Medrol.Chest x-ray reporting similar to prior exam.  Received Lasix 

yesterday, diuresed well with 24-hour I&O reflecting a negative fluid balance.  

BUN 40, creatinine 0.88.  Afebrile, WBC 20. 








04/15/2021 anxiety significantly improved on Precedex and morphine.  Maintaining

O2 sats in the high 80s on 100% BiPAP pressure 16/12.  Chest x-ray reporting 

some slight improved aeration at the right lung base.  





Therapeutic dosed Lovenox; unstable to travel for CTA to rule out PE .Afebrile, 

WBC 18.4.  Diuresing and remains in a negative fluid balance.  BUN 50, 

creatinine 0.99.








04/16/2021 continues to require BiPAP 100% FiO2, maintaining O2 sats high 80s to

low 90s.  D-dimer 1.77. therapeutic dosed Lovenox decreased. LDH 1371, CRP 6.3. 

24-hour I&O remains in a negative fluid balance. BUN 47, creatinine 0.78.   TPN 

for nutritional support ,Blood sugars controlled.T bili 1.4, ALT 1:15.  

Afebrile, WBC 21.3.





Objective





- Vital Signs


Vital signs: 


                                   Vital Signs











Temp  98.0 F   04/16/21 08:00


 


Pulse  50 L  04/16/21 15:00


 


Resp  28 H  04/16/21 15:00


 


BP  119/84   04/16/21 15:00


 


Pulse Ox  88 L  04/16/21 15:00








                                 Intake & Output











 04/15/21 04/16/21 04/16/21





 18:59 06:59 18:59


 


Intake Total 513.394 605.806 317.600


 


Output Total 805 965 400


 


Balance -291.606 -359.194 -82.400


 


Weight 99.6 kg 98 kg 98 kg


 


Intake:   


 


   240 170


 


    Fluconazole in NaCl,Iso- 100  50





    Osm 100 mg In Saline 1   





    50ml.bag @ 50 mls/hr IVPB   





    DA[facility] Rx#:[id number]   


 


    Sodium Chloride 0.9% 1, 220 240 120





    000 ml @ 20 mls/hr IV .   





    Q24H [facility] Rx#:[id number]   


 


  Intake, IV Titration 193.394 365.806 147.600





  Amount   


 


    Dexmedetomidine/0.9% NaCl 193.394 95.781 





    (Pmx) 400 mcg In Empty   





    Bag 1 bag @ Titrate IV .   





    Q0M [facility] Rx#:[id number]   


 


    Dexmedetomidine/0.9% NaCl  60.025 117.600





    (Pmx) 400 mcg In Empty   





    Bag 1 bag @ Titrate IV .   





    Q0M [facility] Rx#:[id number]   


 


    Mvi, Adult No.4 with Vit  210 30





    K 10 ml Trace (Conc-1Ml/   





    Dose) 1 ml Sodium Acetate   





    30 meq Potassium   





    Chloride 20 meq Calcium   





    Gluconate 1 gm In Amino   





    Acids 5 %/Dextrose 20 % 1   





    ,000 ml @ 30 mls/hr IV .   





    Q24H [facility] Rx#:[id number]   


 


Output:   


 


  Urine 805 965 400


 


Other:   


 


  Voiding Method Indwelling Catheter Indwelling Catheter Indwelling Catheter


 


  # Voids  1 














- Exam


Limited exam secondary to covid, full exam deferred to intensivist





General: Sitting up in bed, wearing BiPAP


CV: Regular rate and rhythm











- Labs


CBC & Chem 7: 


                                 04/16/21 05:01





                                 04/16/21 05:01


Labs: 


                  Abnormal Lab Results - Last 24 Hours (Table)











  04/16/21 04/16/21 04/16/21 Range/Units





  00:02 05:01 05:01 


 


WBC     (3.8-10.6)  k/uL


 


Neutrophils #     (1.3-7.7)  k/uL


 


Lymphocytes #     (1.0-4.8)  k/uL


 


D-Dimer   1.77 H   (<0.60)  mg/L FEU


 


Chloride    109 H  ()  mmol/L


 


BUN    47 H  (9-20)  mg/dL


 


Glucose    150 H  (74-99)  mg/dL


 


POC Glucose (mg/dL)  166 H    (75-99)  mg/dL


 


Magnesium    2.6 H  (1.6-2.3)  mg/dL


 


Total Bilirubin    1.4 H  (0.2-1.3)  mg/dL


 


ALT    115 H  (4-49)  U/L


 


Lactate Dehydrogenase    1371 H  (313-618)  U/L


 


Total Protein    5.6 L  (6.3-8.2)  g/dL


 


Albumin    2.9 L  (3.5-5.0)  g/dL














  04/16/21 04/16/21 04/16/21 Range/Units





  05:01 05:50 13:15 


 


WBC  21.3 H    (3.8-10.6)  k/uL


 


Neutrophils #  20.3 H    (1.3-7.7)  k/uL


 


Lymphocytes #  0.4 L    (1.0-4.8)  k/uL


 


D-Dimer     (<0.60)  mg/L FEU


 


Chloride     ()  mmol/L


 


BUN     (9-20)  mg/dL


 


Glucose     (74-99)  mg/dL


 


POC Glucose (mg/dL)   137 H  138 H  (75-99)  mg/dL


 


Magnesium     (1.6-2.3)  mg/dL


 


Total Bilirubin     (0.2-1.3)  mg/dL


 


ALT     (4-49)  U/L


 


Lactate Dehydrogenase     (313-618)  U/L


 


Total Protein     (6.3-8.2)  g/dL


 


Albumin     (3.5-5.0)  g/dL














Assessment and Plan


Assessment: 


Severe sepsis secondary to Covid pneumonia, beyond window for Remdesevir.  

Possible PE, unstable for transfer for CTA, maintained on Lovenox.





Acute hypoxic respiratory failure secondary to the above, worsening





Gastroesophageal reflux disease





Chronic low back pain





Former nicotine dependence




















Plan: Continue on current medication regime ,monitoring and symptomatic 

treatment.ICU management as per pulmonary/intensivist.Covid regimen. Prognosis 

guarded.

















The impression and plan of care has been dictated as directed.





:


I performed a history and examination of this patient,  discussed the same with 

the dictator.  I agree with the dictator's note ,documented as a scribe.  Any 

additional findings or plans will be noted.

## 2021-04-17 LAB
ALBUMIN SERPL-MCNC: 2.8 G/DL (ref 3.5–5)
ALP SERPL-CCNC: 95 U/L (ref 38–126)
ALT SERPL-CCNC: 123 U/L (ref 4–49)
ANION GAP SERPL CALC-SCNC: 5 MMOL/L
AST SERPL-CCNC: 48 U/L (ref 17–59)
BASOPHILS # BLD AUTO: 0 K/UL (ref 0–0.2)
BASOPHILS NFR BLD AUTO: 0 %
BUN SERPL-SCNC: 39 MG/DL (ref 9–20)
CALCIUM SPEC-MCNC: 8.8 MG/DL (ref 8.4–10.2)
CHLORIDE SERPL-SCNC: 111 MMOL/L (ref 98–107)
CO2 SERPL-SCNC: 23 MMOL/L (ref 22–30)
EOSINOPHIL # BLD AUTO: 0.2 K/UL (ref 0–0.7)
EOSINOPHIL NFR BLD AUTO: 1 %
ERYTHROCYTE [DISTWIDTH] IN BLOOD BY AUTOMATED COUNT: 5.93 M/UL (ref 4.3–5.9)
ERYTHROCYTE [DISTWIDTH] IN BLOOD: 13 % (ref 11.5–15.5)
GLUCOSE BLD-MCNC: 135 MG/DL (ref 75–99)
GLUCOSE BLD-MCNC: 173 MG/DL (ref 75–99)
GLUCOSE BLD-MCNC: 194 MG/DL (ref 75–99)
GLUCOSE BLD-MCNC: 200 MG/DL (ref 75–99)
GLUCOSE SERPL-MCNC: 125 MG/DL (ref 74–99)
HCT VFR BLD AUTO: 51.1 % (ref 39–53)
HGB BLD-MCNC: 17 GM/DL (ref 13–17.5)
LDH SPEC-CCNC: 1533 U/L (ref 313–618)
LYMPHOCYTES # SPEC AUTO: 0.4 K/UL (ref 1–4.8)
LYMPHOCYTES NFR SPEC AUTO: 1 %
MAGNESIUM SPEC-SCNC: 2.3 MG/DL (ref 1.6–2.3)
MCH RBC QN AUTO: 28.6 PG (ref 25–35)
MCHC RBC AUTO-ENTMCNC: 33.2 G/DL (ref 31–37)
MCV RBC AUTO: 86.2 FL (ref 80–100)
MONOCYTES # BLD AUTO: 0.8 K/UL (ref 0–1)
MONOCYTES NFR BLD AUTO: 3 %
NEUTROPHILS # BLD AUTO: 29.6 K/UL (ref 1.3–7.7)
NEUTROPHILS NFR BLD AUTO: 95 %
PLATELET # BLD AUTO: 267 K/UL (ref 150–450)
POTASSIUM SERPL-SCNC: 4.7 MMOL/L (ref 3.5–5.1)
PROT SERPL-MCNC: 5.5 G/DL (ref 6.3–8.2)
SODIUM SERPL-SCNC: 139 MMOL/L (ref 137–145)
WBC # BLD AUTO: 31 K/UL (ref 3.8–10.6)

## 2021-04-17 RX ADMIN — ENOXAPARIN SODIUM SCH MG: 40 INJECTION SUBCUTANEOUS at 07:57

## 2021-04-17 RX ADMIN — MORPHINE SULFATE PRN MG: 4 INJECTION, SOLUTION INTRAMUSCULAR; INTRAVENOUS at 15:22

## 2021-04-17 RX ADMIN — INSULIN ASPART SCH: 100 INJECTION, SOLUTION INTRAVENOUS; SUBCUTANEOUS at 15:18

## 2021-04-17 RX ADMIN — ENOXAPARIN SODIUM SCH MG: 40 INJECTION SUBCUTANEOUS at 22:27

## 2021-04-17 RX ADMIN — INSULIN ASPART SCH UNIT: 100 INJECTION, SOLUTION INTRAVENOUS; SUBCUTANEOUS at 17:47

## 2021-04-17 RX ADMIN — OXYCODONE HYDROCHLORIDE AND ACETAMINOPHEN SCH: 500 TABLET ORAL at 08:22

## 2021-04-17 RX ADMIN — PANTOPRAZOLE SODIUM SCH MG: 40 INJECTION, POWDER, FOR SOLUTION INTRAVENOUS at 07:57

## 2021-04-17 RX ADMIN — MORPHINE SULFATE PRN MG: 4 INJECTION, SOLUTION INTRAMUSCULAR; INTRAVENOUS at 20:18

## 2021-04-17 RX ADMIN — METHYLPREDNISOLONE SODIUM SUCCINATE SCH MG: 125 INJECTION, POWDER, FOR SOLUTION INTRAMUSCULAR; INTRAVENOUS at 19:00

## 2021-04-17 RX ADMIN — Medication SCH: at 08:22

## 2021-04-17 RX ADMIN — MORPHINE SULFATE PRN MG: 4 INJECTION, SOLUTION INTRAMUSCULAR; INTRAVENOUS at 05:00

## 2021-04-17 RX ADMIN — TAMSULOSIN HYDROCHLORIDE SCH: 0.4 CAPSULE ORAL at 08:22

## 2021-04-17 RX ADMIN — ALBUTEROL SULFATE PRN PUFF: 90 AEROSOL, METERED RESPIRATORY (INHALATION) at 12:52

## 2021-04-17 RX ADMIN — CEFAZOLIN SCH: 330 INJECTION, POWDER, FOR SOLUTION INTRAMUSCULAR; INTRAVENOUS at 15:18

## 2021-04-17 RX ADMIN — MORPHINE SULFATE PRN MG: 4 INJECTION, SOLUTION INTRAMUSCULAR; INTRAVENOUS at 11:42

## 2021-04-17 RX ADMIN — INSULIN ASPART SCH UNIT: 100 INJECTION, SOLUTION INTRAVENOUS; SUBCUTANEOUS at 06:02

## 2021-04-17 RX ADMIN — METHYLPREDNISOLONE SODIUM SUCCINATE SCH MG: 125 INJECTION, POWDER, FOR SOLUTION INTRAMUSCULAR; INTRAVENOUS at 00:42

## 2021-04-17 RX ADMIN — METHYLPREDNISOLONE SODIUM SUCCINATE SCH MG: 125 INJECTION, POWDER, FOR SOLUTION INTRAMUSCULAR; INTRAVENOUS at 06:14

## 2021-04-17 RX ADMIN — FLUCONAZOLE IN SODIUM CHLORIDE SCH MLS/HR: 2 INJECTION, SOLUTION INTRAVENOUS at 09:18

## 2021-04-17 RX ADMIN — MORPHINE SULFATE PRN MG: 4 INJECTION, SOLUTION INTRAMUSCULAR; INTRAVENOUS at 08:00

## 2021-04-17 RX ADMIN — METHYLPREDNISOLONE SODIUM SUCCINATE SCH MG: 125 INJECTION, POWDER, FOR SOLUTION INTRAMUSCULAR; INTRAVENOUS at 11:42

## 2021-04-17 RX ADMIN — INSULIN ASPART SCH: 100 INJECTION, SOLUTION INTRAVENOUS; SUBCUTANEOUS at 00:41

## 2021-04-17 RX ADMIN — MORPHINE SULFATE PRN MG: 4 INJECTION, SOLUTION INTRAMUSCULAR; INTRAVENOUS at 00:39

## 2021-04-17 RX ADMIN — ALBUTEROL SULFATE PRN PUFF: 90 AEROSOL, METERED RESPIRATORY (INHALATION) at 07:36

## 2021-04-17 RX ADMIN — DEXMEDETOMIDINE HYDROCHLORIDE SCH MLS/HR: 4 INJECTION, SOLUTION INTRAVENOUS at 09:17

## 2021-04-17 RX ADMIN — DEXMEDETOMIDINE HYDROCHLORIDE SCH MLS/HR: 4 INJECTION, SOLUTION INTRAVENOUS at 14:56

## 2021-04-17 NOTE — P.PN
Subjective


Progress Note Date: 04/17/21








CoVID 19 pneumonia





50-year-old male, with history of shortness of breath, who presents to the 

emergency department on March 31, a little after 9:00 PM.  He apparently was 

brought in by EMS.  I saw the patient in the emergency room, in room 33.  He was

on O2 several liters by nasal cannula and not receiving any IV fluids.  He has 

been sick for about 11 or 12 days.  He apparently just tested positive today.  

His symptoms included shortness of breath, weakness, fatigue, low saturations, 

and fever.  As an outpatient, he was on Tylenol, a Z-Alex, Claritin, Zofran, 

Flomax, and a Medrol Dosepak.  He does have ALLERGIES to penicillin antibiotics,

and sulfa antibiotics.  The patient was quite fatigued when I saw him in the 

emergency room.  He could barely open his eyes.  He was not demonstrating any 

signs or symptoms of respiratory compromise, and did not have any conversational

dyspnea or accessory muscle use.  His white count was 4.2, hemoglobin 16, 

hematocrit 46.2, and platelet count 201,000.  PT/INR PTT were normal.  Sodium 

136, potassium 4.4, chlorides 104, CO2 24, anion gap 8, BUN 17, and creatinine 

0.94.  AST was 96, FiO2 104, LDH was 723, and C-reactive protein was 133.9.  

There was no d-dimer ordered.  A chest x-ray did reveal bilateral infiltrates.





The patient is seen today 04/02/2021 in follow-up on the regular medical floor. 

He is currently awake, alert, in no acute distress.  He is still requiring AirVo

high flow nasal cannula at 60 L and 90% FiO2 to maintain O2 saturations in the 

low 90s.  He is currently afebrile.  Hemodynamically stable.  Blood cultures 

reveal no growth.  He remains on Lovenox, Decadron, vitamin supplements. 





The patient is seen today 04/03/2021 in follow-up on the regular medical floor. 

He is currently resting on his right side in bed.  Still quite short of breath 

with minimal exertion.  Still requiring AirVo high flow oxygen at 60 L and 90% 

FiO2 to maintain O2 saturations in the 90s.  Chest x-ray continues to show 

persistent interstitial opacities improving and the left but now worsening on 

the right mid and lower lungs.  D-dimer 0.91.  .  C-reactive protein 2O2.

 He remains on dexamethasone, Lovenox, vitamin supplements.





The patient is seen today 04/04/2021 in follow-up in the intensive care unit.  

He was transferred here last evening after developing increasing shortness of 

breath and increasing oxygen requirements.  He is currently resting fairly 

comfortably in bed.  He remains on BiPAP 14/7 and 100% FiO2 to maintain O2 

saturations in the low 90s.  Chest x-ray continues to show persistent right 

greater than left diffuse interstitial opacities with interval decrease in the 

right lung base and mild increase on the left.  He did receive Tocilizumab and 

convalescent plasma yesterday.  He remains on Lovenox, IV Solu-Medrol, vitamin 

supplements.  White count 10.1.  Hemoglobin 15.5.  Lymphocytes 0.4.  D-dimer 

1.27.  Sodium 140.  Potassium 4.6.  Creatinine 0.82.  LDH 10,025.  C-reactive 

protein 171.





Progress note dated 04/05/2021.





The patient was admitted to the hospital on March 31.  He came to the ICU on 

April 3.  He is currently unfortunately on BiPAP at 14/7, and 100%.  He is 

getting saline at 75 mL an hour.  He is quite short of breath.  The patient did 

receive both convalescent plasma, and TOCI on April 3.  The patient doesn't feel

like he is getting better.  He doesn't feel any worse but just doesn't feel like

he is improving.  He does realize, that in the end, he may end up on the 

mechanical ventilator.  White count 14.7, hemoglobin 15.7, hematocrit 46.0, 

platelet count 321,000.  D-dimer is 1.45, and fibrinogen is 673.  Sodium 143, 

potassium 4.3, chlorides 109, CO2 26, anion gap 8, BUN 41, and creatinine 0.94. 

Most recent LDH is 1025.  The most recent C-reactive protein is 64.1.  Chest x-

ray continues to show bilateral diffuse interstitial infiltrates.





Progress note dated 04/06/2021.





50-year-old male, admitted to the hospital on March 31.  He came in to the ICU 

on April 3.  Currently, the patient's on BiPAP with IPAP of 14, EPAP of 7.  FiO2

is 100%.  When he is not on BiPAP, the patient's on 15 L high flow nasal O2.  In

addition, when he is on the high flow nasal oxygen, he also has a nonrebreather 

mask on as well.  The patient's getting saline at 75 mL an hour.  White count 

14, hemoglobin 14.7, hematocrit 43.7, and platelet count 328,000.  D-dimer 1.76.

 Sodium 142, potassium 4.8, chlorides 112, CO2 26, anion gap 4, BUN and 

creatinine were 39 and 0.91.  C-reactive protein is down to 29.6.  Chest x-ray 

shows diffuse bilateral infiltrates.





The patient is seen today 04/07/2021 in follow-up in the intensive care unit.  

He is currently sitting up in bed.  Awake and alert.  Currently on 15 L high 

flow nasal cannula along with 100% nonrebreather mask.  He did not utilize the 

BiPAP last evening.  He has 0.9 normal saline at 75 ML's per hour.  Still 

dyspneic with minimal exertion.  Chest x-ray continues to show diffuse mixed 

interstitial and alveolar infiltrates.  Stable compared to previous.  Blood 

culture reveals no growth.  White count 13.6.  Hemoglobin 14.8.  Lymphocytes 

0.4.  Sodium 142.  Potassium 4.2.  Creatinine 0.86.  LDH 1127, C-reactive 

protein 17.6.  He remains on bronchodilators, IV Solu-Medrol, Lovenox, vitamin 

supplements.





The patient is seen today 04/08/2021 in follow-up in the intensive care unit.  

He is currently resting fairly comfortably in bed.  No significant events 

overnight.  He is maintaining O2 saturations in the upper 80s and low 90s on 15 

L high flow nasal cannula and addition to a nonrebreather mask.  0.9 normal 

saline at 75 mL per hour.  Chest x-ray shows no changes and bilateral 

infiltrates.  White count 13.3.  Hemoglobin 15.3.  Lymphocytes 0.4.  D-dimer 

3.36.  Sodium 138.  Potassium 4.3.  Creatinine 0.72.  C-reactive protein 14.4.  

He remains on Lovenox, IV Solu-Medrol, vitamin supplements.





The patient is seen today 04/09/2021 follow-up in the intensive care unit.  He 

is currently sitting up in bed.  Awake and alert.  In mild respiratory distress.

 Still quite dyspneic with minimal exertion.  Still with oxygen desaturations 

with minimal exertion.  He is currently on airflow high flow oxygen at 60 L/m 

and 90% FiO2 along with a nonrebreather mask to maintain O2 saturations 85-86%. 

Morning blood gases revealed a PaO2 of 49, pCO2 30 and a pH of 7.48.  0.9 normal

sinus 75 ML's per hour.  He has received a unit of convalescent plasma.  Blood 

cultures reveal no growth.  White count 12.2.  Hemoglobin 15.2.  Lymphocytes 

0.3.  D-dimer 2.84.  Sodium 138.  Potassium 4.3.  Creatinine 0.69.  LDH 1196.  

C-reactive protein 10.4.  Remains on IV Solu-Medrol, rhonchi dilators, Lovenox, 

vitamin supplements.





The patient is seen today 04/10/2021 follow-up in the intensive care unit.  He 

is currently sitting up in bed.  Awake and alert in no acute distress.  Still 

quite dyspneic with minimal exertion in conversation.  He remains on AirVo high 

flow oxygen at 60 L and 95% FiO2 along with a nonrebreather mask with O2 

saturations in the mid 80s.  He did receive convalescent plasma and tocilizumab 

back on 04/03/2021.  He has a 0.9 normal saline at 75 mL per hour.  Yesterday's 

blood gases revealed a pO2 of 49, pCO2 of 30 and a pH of 7.5.  Chest x-ray 

revealing slight improvement of bibasilar infiltrates.  Blood cultures reveal no

growth.  White count 12.7.  Hemoglobin 15.1.  D-dimer 2.47.  Sodium 135.  

Potassium 4.3.  Creatinine 0.74.  LDH 1213.  C-reactive protein 8.6.  Continued 

on Lovenox, IV Solu-Medrol, vitamin supplements.





The patient is seen today 04/11/2021 in follow-up in the intensive care unit.  

He remains sitting up in bed.  Awake and alert.  He is continued on AirVo high 

flow oxygen at 60 L and 95% FiO2 along with a nonrebreather mask.  O2 

saturations in the 80s.  He is 0.9 normal saline at 75 ML's per hour.  He 

received convalescent plasma 1.  Blood cultures reveal no growth.  White count 

11.1.  Hemoglobin 15.3.  Lymphocytes 0.4.  Sodium 134.  Potassium 4.4.  

Creatinine 0.71.  LDH 1172.  C-reactive protein 7.5.  He remains on Lovenox, 

Solu-Medrol, vitamin supplements. 





On 04/12/2021, I'm seeing this patient in follow-up.  The patient was Hospital 

as forCOVID 19  pneumonia and the patient progressive hypoxic respiratory 

failure.  The patient was initially admitted on 04/04/2021 and the patient is 

currently in the intensive care unit requiring high flow oxygen.  He is 

currently on high flow oxygen at 60 L with an FiO2 of 95% in addition to a 

nonrebreather facemask.  He is awake and alert.  He is short of breath with 

limited amount of activity and he easily desaturates.  No significant chest 

pain.  Cough with deep breathing.  The patient has already received convalescent

plasma.  The patient is currently on IV Solu-Medrol 60 mg every 6 hours of this 

in addition to multivitamins. He was also treated with Tocilizumab.  LDH level 

from yesterday was 1157 and the CRP was 8.1.  Chest x-ray still unchanged with 

diffuse bilateral pulmonary infiltrates.  This patient otherwise has been in 

good state of health.  No chronic illnesses otherwise.  He is being monitored 

very closely here in the intensive care unit due to concerns of progressive 

respiratory failure requiring intubation mechanical ventilation.  Blood cultures

of been negative thus far.





On 04/13/2021, the patient is being seen for a follow-up.  This is a case of 

Coumadin related pneumonia with hypoxic respiratory failure.  The patient was on

high flow oxygen yesterday and he was on 60 L with an FiO2 of 95%.  He was also 

using the nonrebreather facemask in addition to the high flow oxygen.  Was doing

some activity and movement yesterday, the patient desaturated down to the 60s 

and he was hard to recovered.  At this time, the patient became quite restless 

and agitated.  He was placed on a BiPAP briefly which made him more panicky 

uncomfortable.  Overnight, he was also started on Precedex which is currently ru

nning at 0.2 mcg/kg per minute.  Ultimately, he was taken off the BiPAP and the 

patient is currently back on high flow oxygen at 60 L with a 93% in addition to 

100% nonrebreather facemask.  His current pulse ox is order of 76-82 percent.  

The chest x-ray from today is showing bilateral lower lobe pulmonary infiltrates

and compared to yesterday's chest x-ray, there is no major interval change.  Fin

dings and essentially stable.  Meanwhile, his blood work from today is showing a

white cell count of 15.8, he does have lymphopenia, renal function is stable, 

LDH level is 1-15 and the CRP is 7.4.  I also check a pro-calcitonin level on 

him yesterday and the level came back low at 0.08.  Meanwhile, the patient is 

currently on Solu-Medrol 60 mg every 6 hours he is also on Lovenox 40 mg subcu 

every 24 hours.  The patient remains on Precedex for now.  His calm and 

comfortable and responsive.





04/14/2021, the patient is having difficulty with hypoxemia and worsening 

shortness of breath.  Note that after being on high flow oxygen for quite some 

time, the patient started showing some signs of decompensated yesterday.  His 

pulse ox was dropping in the mid and low 80s and he was very slow in recovering.

 Based on that, he was switched to a BiPAP which is currently running at a BiPAP

pressure of 14/5 cm of water with an FiO2 of 100%.  Despite all this, he remains

tachypneic.  The patient prior volume is above 1 L and his respiratory rate is 

in the mid 40s and is generating a very high risk ventilation even without the 

BiPAP.  He is quite tachypneic.  Pulse ox currently is in the low 70s.  He is on

Precedex.  He is very anxious.  His panicky.  He is restless. Precedex is 

running at micrograms per kilogram per minute.  As far as his treatment, the 

patient remains on IV Solu Medrol 60 mg every 6 hours.  He is also on Lovenox 40

mg subcu every 24 hours.  Chest x-ray from today is showing stable bilateral 

pulmonary infiltrates, essentially unchanged compared to yesterday.  Doppler of 

the lower extremities was done yesterday showed no evidence of DVT.  His 

inflammatory markers today is showing a d-dimer of 4.1 from yesterday.  LDH is 

01/04/2009, higher, CRP is 5.7, able.  Rest of the electrodes are stable, BUN is

40 with a creatinine of 0.8.  His white cell count is currently at 20 and 

hemoglobin is at 16.  His net fluid balance has been -2.6 L and the patient was 

given Lasix yesterday and the patient was Negative Fluid Balance.





04/15/2021 the patient is being seen for a follow-up.  He obviously not 

intubated yesterday as the patient calmed down considerably with utilization of 

Precedex and morphine.  Nevertheless, his sister status remains borderline and 

currently is still on a BiPAP at a pressure of 16/12 and FiO2 of 100% and the 

patient is also on Precedex and points Precedex at 0.7 mcg/kg per minute and 

morphine as needed.  He is not was essentially uneventful.  His current pulse ox

is around 89%.  The easily desaturates.  He had several events yesterday with 

his pulse ox dropped to lower levels and is taking them quite some time until he

recovers probably in the order of 30 minutes to 45 minutes.  During this time 

the patient becomes quite panicky restless and short of breath.  We have still 

avoided intubation on this patient and were continuing the supportive care with 

noninvasive positive pressure ventilation.  The chest x-ray remains stable 

without any interval change and there is some lower lobe at the pulmonary 

infiltrates.  The patient's white cell count is 18.4 with a hemoglobin of 16.8. 

His electrolytes are normal, renal function is showing a stable creatinine of 

0.9, rest of the inflammatory markers are still pending for now.  Fluid balance 

has been in the order of -2.6 L for yesterday and the patient is headed towards 

more negative fluid balance for today.  The patient remains on IV Solu-Medrol 60

mg every 6 hours.  He is on Lovenox therapeutic dose of 100 mg by mouth every 12

hours therapeutic dose as well as utilized as the patient was getting 

progressively more hypoxic and pulmonary embolism was a constellation.  The 

patient was unable to undergo a CT angiogram because of his very limited 

pulmonary reserve and borderline respiratory status.  Doppler of the lower 

extremities were obviously negative.  No signs of bleeding and was going to 

continue the therapy goes of Lovenox for another 24 hours.  D-dimer from today 

is pending for now.  Clinically, he is resting comfortably in bed.  His night 

was otherwise uneventful.  A Devlin catheter was also inserted yesterday is 

limited his mobility.





04/16/2021, the patient remains on a BiPAP of 16/12 cm of water with an FiO2 of 

100%.  His current respiratory rate is in the low 30s.  He seems to be 

comfortable, he had very much dependent on a BiPAP and the patient has limited r

eserve and easily desaturates.  His been on BiPAP for the past 3 days for now.  

This is making his nose irritated in the mouth dry.  The chest x-ray from today 

still pending.  Yesterday's chest x-ray was reviewed and the findings were 

essentially stable.  To control his increased level of anxiety and synchrony 

with the noninvasive positive pressure ventilator, and without Precedex which is

currently running at 0.7 mcg/kg per minute and this has done significant 

improvement in terms of lowering his respiratory rate and taking control of his 

anxiety.  He is on TPN for nutritional support for now.  He has a PICC line in 

his left upper extremity.  In terms of therapy, he remains on Lovenox 1 mg every

every 12 hours.  He remains on IV Solu Medrol 60 mg IV every 6 hours.  

Inflammatory markers on today's evaluation showed a d-dimer of 1.77, his LDH 

level is 1371 and his CRP level is at 6.3.  His electrolytes are all within 

normal limits.  His fluid balance over the past 24 hours is negative for 54 mL 

and the patient was being diuresed with Lasix.  He does not have a lazy 

extending dose for now.  He was also given Diflucan regarding the possibility of

some oropharyngeal candidiasis.  IV fluids currently running at 20 mL an hour.  

TPN is currently running at 30 mL an hour and the patient currently has a Devlin 

catheter in place.





04/17/2021, the patient is still on BiPAP.  He was on BiPAP overnight and is 

getting quite anxious and tired of being on the BiPAP.  Current BiPAP settings 

of 16/12 an FiO2 of 100%.  Current pulse ox is ranging between 84 and 87%.  He 

wants to try to high flow oxygen system again.  His white cell count is at 31.  

His d-dimer is at 1.5.  His LDH level is 1533 and his CRP level is at 0.5.  His 

chest x-ray from today is showing infiltrates in lung bases bilaterally, 

essentially unchanged compared to the yesterday's chest film.  I was told by the

nursing staff that even while receiving oral care.  He is still on Precedex at 

0.7 mcg/kg per minute.  He remains on IV Solu Medrol 60 mg every 6 hours.  I 

will today's condition essentially unchanged..  Have some oropharyngeal 

candidiasis for which she was started on Diflucan.  He remains on TPN for 

nutritional support which is running at 30 mL an hour.  He is awake and alert 

and is, indicating.  Altered mentation.  Found the week.  Quite discouraged 

because of his ongoing respiratory failure.  Unable to use a incentive 

spirometer as the patient is currently strictly on BiPAP.  He is not 

tachycardic.  His current respiratory rate is in the mid 20s.  He is able to 

generate higher tidal volumes and his minute ventilation continues to be quite 

elevated at 30





Objective





- Vital Signs


Vital signs: 


                                   Vital Signs











Temp  97.9 F   04/17/21 04:00


 


Pulse  58 L  04/17/21 07:00


 


Resp  20   04/17/21 07:00


 


BP  117/84   04/17/21 07:00


 


Pulse Ox  88 L  04/17/21 07:00








                                 Intake & Output











 04/16/21 04/17/21 04/17/21





 18:59 06:59 18:59


 


Intake Total 461.896 220 20


 


Output Total 750 885 50


 


Balance -288.104 -665 -30


 


Weight 98 kg 97.1 kg 


 


Intake:   


 


   220 20


 


    Fluconazole in NaCl,Iso- 50  





    Osm 100 mg In Saline 1   





    50ml.bag @ 50 mls/hr IVPB   





    [facility][id number]   


 


    Sodium Chloride 0.9% 1, 240 220 20





    000 ml @ 20 mls/hr IV .   





    [facility] Rx#:[id number]   


 


  Intake, IV Titration 171.896  





  Amount   


 


    Dexmedetomidine/0.9% NaCl 141.896  





    (Pmx) 400 mcg In Empty   





    Bag 1 bag @ Titrate IV .   





    [facility]0M [facility] Rx#:[id number]   


 


    Mvi, Adult No.4 with Vit 30  





    K 10 ml Trace (Conc-1Ml/   





    Dose) 1 ml Sodium Acetate   





    30 meq Potassium   





    Chloride 20 meq Calcium   





    Gluconate 1 gm In Amino   





    Acids 5 %/Dextrose 20 % 1   





    ,000 ml @ 30 mls/hr IV .   





    Q24H [facility] Rx#:[id number]   


 


Output:   


 


  Urine 750 885 50


 


Other:   


 


  Voiding Method Indwelling Catheter Indwelling Catheter 














- Exam








GENERAL EXAM: Alert, pleasant 50-year-old gentleman, patient is in obvious 

respiratory failure on a BiPAP of 16/12, tachypneic, tachycardic, restless, 

using excessive muscle breathing and the patient's pulse is currently is in the 

high 80s for now.  He is laying supine.  Unable to prone the patient.  Awake and

alert.  Communicating.  He remains rashes.  He feels less short of breath 

compared to yesterday.  Continues to have elevated minute ventilation while 

being on a BiPAP.


HEAD: Normocephalic.


EYES: Normal reaction of pupils, equal size.


NOSE: Clear with pink turbinates.


THROAT: No erythema or exudates.


NECK: No masses, no JVD.


CHEST: No chest wall deformity.


LUNGS: Equal air entry with basilar crackles.  Very much tachypneic and the 

patient is using excessive muscle breathing


CVS: S1 and S2 normal with no audible murmur, regular rhythm.


ABDOMEN: No hepatosplenomegaly, normal bowel sounds, no guarding or rigidity.


SPINE: No scoliosis or deformity


SKIN: No rashes


CENTRAL NERVOUS SYSTEM: No focal deficits, tone is normal in all 4 extremities.


EXTREMITIES: There is no peripheral edema.  No clubbing, no cyanosis.  

Peripheral pulses are intact., on Precedex, able to follow some simple commands.

 His appropriate and he follows commands and answers questions adequately.  

Precedex is running at 0.7.  No focal neurological deficit at this point in 

time.  He is alert and oriented 3.





- Labs


CBC & Chem 7: 


                                 04/17/21 04:15





                                 04/17/21 04:15


Labs: 


                  Abnormal Lab Results - Last 24 Hours (Table)











  04/16/21 04/16/21 04/16/21 Range/Units





  05:01 13:15 17:18 


 


WBC  21.3 H    (3.8-10.6)  k/uL


 


RBC     (4.30-5.90)  m/uL


 


Neutrophils #  20.3 H    (1.3-7.7)  k/uL


 


Lymphocytes #  0.4 L    (1.0-4.8)  k/uL


 


D-Dimer     (<0.60)  mg/L FEU


 


Chloride     ()  mmol/L


 


BUN     (9-20)  mg/dL


 


Glucose     (74-99)  mg/dL


 


POC Glucose (mg/dL)   138 H  156 H  (75-99)  mg/dL


 


ALT     (4-49)  U/L


 


Lactate Dehydrogenase     (313-618)  U/L


 


Total Protein     (6.3-8.2)  g/dL


 


Albumin     (3.5-5.0)  g/dL














  04/16/21 04/17/21 04/17/21 Range/Units





  23:54 04:15 04:15 


 


WBC     (3.8-10.6)  k/uL


 


RBC     (4.30-5.90)  m/uL


 


Neutrophils #     (1.3-7.7)  k/uL


 


Lymphocytes #     (1.0-4.8)  k/uL


 


D-Dimer   1.51 H   (<0.60)  mg/L FEU


 


Chloride    111 H  ()  mmol/L


 


BUN    39 H  (9-20)  mg/dL


 


Glucose    125 H  (74-99)  mg/dL


 


POC Glucose (mg/dL)  135 H    (75-99)  mg/dL


 


ALT    123 H  (4-49)  U/L


 


Lactate Dehydrogenase    1533 H  (313-618)  U/L


 


Total Protein    5.5 L  (6.3-8.2)  g/dL


 


Albumin    2.8 L  (3.5-5.0)  g/dL














  04/17/21 04/17/21 Range/Units





  04:15 05:55 


 


WBC  31.0 H   (3.8-10.6)  k/uL


 


RBC  5.93 H   (4.30-5.90)  m/uL


 


Neutrophils #  29.6 H   (1.3-7.7)  k/uL


 


Lymphocytes #  0.4 L   (1.0-4.8)  k/uL


 


D-Dimer    (<0.60)  mg/L FEU


 


Chloride    ()  mmol/L


 


BUN    (9-20)  mg/dL


 


Glucose    (74-99)  mg/dL


 


POC Glucose (mg/dL)   173 H  (75-99)  mg/dL


 


ALT    (4-49)  U/L


 


Lactate Dehydrogenase    (313-618)  U/L


 


Total Protein    (6.3-8.2)  g/dL


 


Albumin    (3.5-5.0)  g/dL














Assessment and Plan


Plan: 








1 Acute hypoxic respiratory failure secondary to CoVID 19 pneumonia.  Outside 

the window for Remdesivir.  Did receive tocilizumab and 1 unit of convalescent 

plasma.    Based on that the patient was placed on noninvasive positive pressure

ventilation the patient continues to be on BiPAP at a pressure of 16/12 an FiO2 

of 100%.  The patient has been on the BiPAP for the past 4 days at least.  He is

on Precedex.  He is also on morphine.  The chest x-ray is unchanged.  LDH level 

is elevated.  Once, the BiPAP for some rest and try to high flow system along 

with 100% nonrebreather facemask.  A trial of that will be done today in the 

ICU.  There is a concern that he may decompensate, however, the patient is 

adamant in doing this trial.   He is receiving TPN for nutritional support.  The

LDH is elevated.  D-dimer is 1.5.





2 History of chronic tobacco dependence





3 elevated inflammatory markers secondary to Covid 19 infection





4 leukocytosis





5 Diflucan for oropharyngeal candidiasis





Plan: 


 


Continue BiPAP for respiratory support, currently on BiPAP of 16/12 cm of water 

and FiO2 is at 100%


Give a trial of high flow oxygen, 60 L with FiO2 of 90% and combination with 

100% nonrebreather facemask.


currently on Precedex for sedation and morphine 


Drop the Lovenox dose of 40 mg subcu every 24 hours


Encouraged again frequent position changes, including prone positioning if 

possible 


 TPN for nutritional support 


Continue IV Solu-Medrol


Chest x-ray is unchanged


Check pro calcitonin level  was low


continue the rest of the supportive care.  Condition is critical.  Very high 

risk for further respiratory insufficiency and failure requiring intubation 

mechanical ventilation.  





Critical care time >30 minutes.


Time with Patient: Greater than 30

## 2021-04-17 NOTE — XR
EXAMINATION TYPE: XR chest 1V portable

 

DATE OF EXAM: 4/17/2021

 

CLINICAL HISTORY: Difficulty breathing progress study.  

 

TECHNIQUE: Single AP portable upright view of the chest is obtained.

 

COMPARISON: Chest x-ray from one day earlier and older studies

 

FINDINGS:  Stable left-sided PICC line. 

 

Increased opacities right greater than left lower lungs is redemonstrated consistent with known covid
-19 infection. Cardiac silhouette size is stable and upper limits of normal. Osseous structures are i
ntact. No obvious right-sided pneumothorax on current study.

 

IMPRESSION: Persistent bibasilar opacities consistent with covid-19 infection. No significant interva
l change from one day earlier. Suspected tiny right apical pneumothorax on most recent study not марина
rly seen on current study.

## 2021-04-18 LAB
ALBUMIN SERPL-MCNC: 2.6 G/DL (ref 3.5–5)
ALP SERPL-CCNC: 88 U/L (ref 38–126)
ALT SERPL-CCNC: 138 U/L (ref 4–49)
ANION GAP SERPL CALC-SCNC: 3 MMOL/L
AST SERPL-CCNC: 49 U/L (ref 17–59)
BASOPHILS # BLD AUTO: 0 K/UL (ref 0–0.2)
BASOPHILS NFR BLD AUTO: 0 %
BUN SERPL-SCNC: 31 MG/DL (ref 9–20)
CALCIUM SPEC-MCNC: 8.5 MG/DL (ref 8.4–10.2)
CHLORIDE SERPL-SCNC: 106 MMOL/L (ref 98–107)
CO2 SERPL-SCNC: 29 MMOL/L (ref 22–30)
EOSINOPHIL # BLD AUTO: 0.1 K/UL (ref 0–0.7)
EOSINOPHIL NFR BLD AUTO: 0 %
ERYTHROCYTE [DISTWIDTH] IN BLOOD BY AUTOMATED COUNT: 5.51 M/UL (ref 4.3–5.9)
ERYTHROCYTE [DISTWIDTH] IN BLOOD: 13.1 % (ref 11.5–15.5)
GLUCOSE BLD-MCNC: 147 MG/DL (ref 75–99)
GLUCOSE BLD-MCNC: 153 MG/DL (ref 75–99)
GLUCOSE BLD-MCNC: 222 MG/DL (ref 75–99)
GLUCOSE SERPL-MCNC: 138 MG/DL (ref 74–99)
HCT VFR BLD AUTO: 48.2 % (ref 39–53)
HGB BLD-MCNC: 15.7 GM/DL (ref 13–17.5)
LDH SPEC-CCNC: 1486 U/L (ref 313–618)
LYMPHOCYTES # SPEC AUTO: 0.3 K/UL (ref 1–4.8)
LYMPHOCYTES NFR SPEC AUTO: 1 %
MAGNESIUM SPEC-SCNC: 2.2 MG/DL (ref 1.6–2.3)
MCH RBC QN AUTO: 28.4 PG (ref 25–35)
MCHC RBC AUTO-ENTMCNC: 32.5 G/DL (ref 31–37)
MCV RBC AUTO: 87.4 FL (ref 80–100)
MONOCYTES # BLD AUTO: 0.7 K/UL (ref 0–1)
MONOCYTES NFR BLD AUTO: 3 %
NEUTROPHILS # BLD AUTO: 23.9 K/UL (ref 1.3–7.7)
NEUTROPHILS NFR BLD AUTO: 96 %
PLATELET # BLD AUTO: 198 K/UL (ref 150–450)
POTASSIUM SERPL-SCNC: 4.5 MMOL/L (ref 3.5–5.1)
PROT SERPL-MCNC: 5 G/DL (ref 6.3–8.2)
SODIUM SERPL-SCNC: 138 MMOL/L (ref 137–145)
WBC # BLD AUTO: 25 K/UL (ref 3.8–10.6)

## 2021-04-18 RX ADMIN — Medication SCH: at 08:01

## 2021-04-18 RX ADMIN — ENOXAPARIN SODIUM SCH MG: 40 INJECTION SUBCUTANEOUS at 09:09

## 2021-04-18 RX ADMIN — MORPHINE SULFATE PRN MG: 4 INJECTION, SOLUTION INTRAMUSCULAR; INTRAVENOUS at 19:04

## 2021-04-18 RX ADMIN — MORPHINE SULFATE PRN MG: 4 INJECTION, SOLUTION INTRAMUSCULAR; INTRAVENOUS at 11:06

## 2021-04-18 RX ADMIN — METHYLPREDNISOLONE SODIUM SUCCINATE SCH MG: 125 INJECTION, POWDER, FOR SOLUTION INTRAMUSCULAR; INTRAVENOUS at 00:19

## 2021-04-18 RX ADMIN — INSULIN ASPART SCH UNIT: 100 INJECTION, SOLUTION INTRAVENOUS; SUBCUTANEOUS at 12:29

## 2021-04-18 RX ADMIN — DEXMEDETOMIDINE HYDROCHLORIDE SCH MLS/HR: 4 INJECTION, SOLUTION INTRAVENOUS at 19:04

## 2021-04-18 RX ADMIN — MORPHINE SULFATE PRN MG: 4 INJECTION, SOLUTION INTRAMUSCULAR; INTRAVENOUS at 20:44

## 2021-04-18 RX ADMIN — INSULIN ASPART SCH: 100 INJECTION, SOLUTION INTRAVENOUS; SUBCUTANEOUS at 06:59

## 2021-04-18 RX ADMIN — DEXMEDETOMIDINE HYDROCHLORIDE SCH MLS/HR: 4 INJECTION, SOLUTION INTRAVENOUS at 15:16

## 2021-04-18 RX ADMIN — INSULIN ASPART SCH: 100 INJECTION, SOLUTION INTRAVENOUS; SUBCUTANEOUS at 18:36

## 2021-04-18 RX ADMIN — DEXTROSE SCH MG: 50 INJECTION, SOLUTION INTRAVENOUS at 09:08

## 2021-04-18 RX ADMIN — MORPHINE SULFATE PRN MG: 4 INJECTION, SOLUTION INTRAMUSCULAR; INTRAVENOUS at 15:15

## 2021-04-18 RX ADMIN — CEFAZOLIN SCH: 330 INJECTION, POWDER, FOR SOLUTION INTRAMUSCULAR; INTRAVENOUS at 17:39

## 2021-04-18 RX ADMIN — INSULIN ASPART SCH UNIT: 100 INJECTION, SOLUTION INTRAVENOUS; SUBCUTANEOUS at 00:12

## 2021-04-18 RX ADMIN — FLUCONAZOLE IN SODIUM CHLORIDE SCH MLS/HR: 2 INJECTION, SOLUTION INTRAVENOUS at 09:07

## 2021-04-18 RX ADMIN — MORPHINE SULFATE PRN MG: 4 INJECTION, SOLUTION INTRAMUSCULAR; INTRAVENOUS at 07:16

## 2021-04-18 RX ADMIN — DEXMEDETOMIDINE HYDROCHLORIDE SCH MLS/HR: 4 INJECTION, SOLUTION INTRAVENOUS at 11:06

## 2021-04-18 RX ADMIN — MORPHINE SULFATE PRN MG: 4 INJECTION, SOLUTION INTRAMUSCULAR; INTRAVENOUS at 09:45

## 2021-04-18 RX ADMIN — MORPHINE SULFATE PRN MG: 4 INJECTION, SOLUTION INTRAMUSCULAR; INTRAVENOUS at 04:42

## 2021-04-18 RX ADMIN — TAMSULOSIN HYDROCHLORIDE SCH: 0.4 CAPSULE ORAL at 08:01

## 2021-04-18 RX ADMIN — METHYLPREDNISOLONE SODIUM SUCCINATE SCH MG: 125 INJECTION, POWDER, FOR SOLUTION INTRAMUSCULAR; INTRAVENOUS at 06:58

## 2021-04-18 RX ADMIN — OXYCODONE HYDROCHLORIDE AND ACETAMINOPHEN SCH: 500 TABLET ORAL at 08:01

## 2021-04-18 RX ADMIN — ALBUTEROL SULFATE PRN PUFF: 90 AEROSOL, METERED RESPIRATORY (INHALATION) at 08:27

## 2021-04-18 RX ADMIN — PANTOPRAZOLE SODIUM SCH MG: 40 INJECTION, POWDER, FOR SOLUTION INTRAVENOUS at 09:08

## 2021-04-18 RX ADMIN — MORPHINE SULFATE PRN MG: 4 INJECTION, SOLUTION INTRAMUSCULAR; INTRAVENOUS at 00:20

## 2021-04-18 NOTE — P.PN
Subjective


Progress Note Date: 04/18/21








CoVID 19 pneumonia





50-year-old male, with history of shortness of breath, who presents to the 

emergency department on March 31, a little after 9:00 PM.  He apparently was 

brought in by EMS.  I saw the patient in the emergency room, in room 33.  He was

on O2 several liters by nasal cannula and not receiving any IV fluids.  He has 

been sick for about 11 or 12 days.  He apparently just tested positive today.  

His symptoms included shortness of breath, weakness, fatigue, low saturations, 

and fever.  As an outpatient, he was on Tylenol, a Z-Alex, Claritin, Zofran, 

Flomax, and a Medrol Dosepak.  He does have ALLERGIES to penicillin antibiotics,

and sulfa antibiotics.  The patient was quite fatigued when I saw him in the 

emergency room.  He could barely open his eyes.  He was not demonstrating any 

signs or symptoms of respiratory compromise, and did not have any conversational

dyspnea or accessory muscle use.  His white count was 4.2, hemoglobin 16, 

hematocrit 46.2, and platelet count 201,000.  PT/INR PTT were normal.  Sodium 

136, potassium 4.4, chlorides 104, CO2 24, anion gap 8, BUN 17, and creatinine 

0.94.  AST was 96, FiO2 104, LDH was 723, and C-reactive protein was 133.9.  

There was no d-dimer ordered.  A chest x-ray did reveal bilateral infiltrates.





The patient is seen today 04/02/2021 in follow-up on the regular medical floor. 

He is currently awake, alert, in no acute distress.  He is still requiring AirVo

high flow nasal cannula at 60 L and 90% FiO2 to maintain O2 saturations in the 

low 90s.  He is currently afebrile.  Hemodynamically stable.  Blood cultures 

reveal no growth.  He remains on Lovenox, Decadron, vitamin supplements. 





The patient is seen today 04/03/2021 in follow-up on the regular medical floor. 

He is currently resting on his right side in bed.  Still quite short of breath 

with minimal exertion.  Still requiring AirVo high flow oxygen at 60 L and 90% 

FiO2 to maintain O2 saturations in the 90s.  Chest x-ray continues to show 

persistent interstitial opacities improving and the left but now worsening on 

the right mid and lower lungs.  D-dimer 0.91.  .  C-reactive protein 2O2.

 He remains on dexamethasone, Lovenox, vitamin supplements.





The patient is seen today 04/04/2021 in follow-up in the intensive care unit.  

He was transferred here last evening after developing increasing shortness of 

breath and increasing oxygen requirements.  He is currently resting fairly 

comfortably in bed.  He remains on BiPAP 14/7 and 100% FiO2 to maintain O2 

saturations in the low 90s.  Chest x-ray continues to show persistent right 

greater than left diffuse interstitial opacities with interval decrease in the 

right lung base and mild increase on the left.  He did receive Tocilizumab and 

convalescent plasma yesterday.  He remains on Lovenox, IV Solu-Medrol, vitamin 

supplements.  White count 10.1.  Hemoglobin 15.5.  Lymphocytes 0.4.  D-dimer 

1.27.  Sodium 140.  Potassium 4.6.  Creatinine 0.82.  LDH 10,025.  C-reactive 

protein 171.





Progress note dated 04/05/2021.





The patient was admitted to the hospital on March 31.  He came to the ICU on 

April 3.  He is currently unfortunately on BiPAP at 14/7, and 100%.  He is 

getting saline at 75 mL an hour.  He is quite short of breath.  The patient did 

receive both convalescent plasma, and TOCI on April 3.  The patient doesn't feel

like he is getting better.  He doesn't feel any worse but just doesn't feel like

he is improving.  He does realize, that in the end, he may end up on the 

mechanical ventilator.  White count 14.7, hemoglobin 15.7, hematocrit 46.0, 

platelet count 321,000.  D-dimer is 1.45, and fibrinogen is 673.  Sodium 143, 

potassium 4.3, chlorides 109, CO2 26, anion gap 8, BUN 41, and creatinine 0.94. 

Most recent LDH is 1025.  The most recent C-reactive protein is 64.1.  Chest x-

ray continues to show bilateral diffuse interstitial infiltrates.





Progress note dated 04/06/2021.





50-year-old male, admitted to the hospital on March 31.  He came in to the ICU 

on April 3.  Currently, the patient's on BiPAP with IPAP of 14, EPAP of 7.  FiO2

is 100%.  When he is not on BiPAP, the patient's on 15 L high flow nasal O2.  In

addition, when he is on the high flow nasal oxygen, he also has a nonrebreather 

mask on as well.  The patient's getting saline at 75 mL an hour.  White count 

14, hemoglobin 14.7, hematocrit 43.7, and platelet count 328,000.  D-dimer 1.76.

 Sodium 142, potassium 4.8, chlorides 112, CO2 26, anion gap 4, BUN and 

creatinine were 39 and 0.91.  C-reactive protein is down to 29.6.  Chest x-ray 

shows diffuse bilateral infiltrates.





The patient is seen today 04/07/2021 in follow-up in the intensive care unit.  

He is currently sitting up in bed.  Awake and alert.  Currently on 15 L high 

flow nasal cannula along with 100% nonrebreather mask.  He did not utilize the 

BiPAP last evening.  He has 0.9 normal saline at 75 ML's per hour.  Still 

dyspneic with minimal exertion.  Chest x-ray continues to show diffuse mixed 

interstitial and alveolar infiltrates.  Stable compared to previous.  Blood 

culture reveals no growth.  White count 13.6.  Hemoglobin 14.8.  Lymphocytes 

0.4.  Sodium 142.  Potassium 4.2.  Creatinine 0.86.  LDH 1127, C-reactive 

protein 17.6.  He remains on bronchodilators, IV Solu-Medrol, Lovenox, vitamin 

supplements.





The patient is seen today 04/08/2021 in follow-up in the intensive care unit.  

He is currently resting fairly comfortably in bed.  No significant events 

overnight.  He is maintaining O2 saturations in the upper 80s and low 90s on 15 

L high flow nasal cannula and addition to a nonrebreather mask.  0.9 normal 

saline at 75 mL per hour.  Chest x-ray shows no changes and bilateral 

infiltrates.  White count 13.3.  Hemoglobin 15.3.  Lymphocytes 0.4.  D-dimer 

3.36.  Sodium 138.  Potassium 4.3.  Creatinine 0.72.  C-reactive protein 14.4.  

He remains on Lovenox, IV Solu-Medrol, vitamin supplements.





The patient is seen today 04/09/2021 follow-up in the intensive care unit.  He 

is currently sitting up in bed.  Awake and alert.  In mild respiratory distress.

 Still quite dyspneic with minimal exertion.  Still with oxygen desaturations 

with minimal exertion.  He is currently on airflow high flow oxygen at 60 L/m 

and 90% FiO2 along with a nonrebreather mask to maintain O2 saturations 85-86%. 

Morning blood gases revealed a PaO2 of 49, pCO2 30 and a pH of 7.48.  0.9 normal

sinus 75 ML's per hour.  He has received a unit of convalescent plasma.  Blood 

cultures reveal no growth.  White count 12.2.  Hemoglobin 15.2.  Lymphocytes 

0.3.  D-dimer 2.84.  Sodium 138.  Potassium 4.3.  Creatinine 0.69.  LDH 1196.  

C-reactive protein 10.4.  Remains on IV Solu-Medrol, rhonchi dilators, Lovenox, 

vitamin supplements.





The patient is seen today 04/10/2021 follow-up in the intensive care unit.  He 

is currently sitting up in bed.  Awake and alert in no acute distress.  Still 

quite dyspneic with minimal exertion in conversation.  He remains on AirVo high 

flow oxygen at 60 L and 95% FiO2 along with a nonrebreather mask with O2 

saturations in the mid 80s.  He did receive convalescent plasma and tocilizumab 

back on 04/03/2021.  He has a 0.9 normal saline at 75 mL per hour.  Yesterday's 

blood gases revealed a pO2 of 49, pCO2 of 30 and a pH of 7.5.  Chest x-ray 

revealing slight improvement of bibasilar infiltrates.  Blood cultures reveal no

growth.  White count 12.7.  Hemoglobin 15.1.  D-dimer 2.47.  Sodium 135.  

Potassium 4.3.  Creatinine 0.74.  LDH 1213.  C-reactive protein 8.6.  Continued 

on Lovenox, IV Solu-Medrol, vitamin supplements.





The patient is seen today 04/11/2021 in follow-up in the intensive care unit.  

He remains sitting up in bed.  Awake and alert.  He is continued on AirVo high 

flow oxygen at 60 L and 95% FiO2 along with a nonrebreather mask.  O2 

saturations in the 80s.  He is 0.9 normal saline at 75 ML's per hour.  He 

received convalescent plasma 1.  Blood cultures reveal no growth.  White count 

11.1.  Hemoglobin 15.3.  Lymphocytes 0.4.  Sodium 134.  Potassium 4.4.  

Creatinine 0.71.  LDH 1172.  C-reactive protein 7.5.  He remains on Lovenox, 

Solu-Medrol, vitamin supplements. 





On 04/12/2021, I'm seeing this patient in follow-up.  The patient was Hospital 

as forCOVID 19  pneumonia and the patient progressive hypoxic respiratory 

failure.  The patient was initially admitted on 04/04/2021 and the patient is 

currently in the intensive care unit requiring high flow oxygen.  He is 

currently on high flow oxygen at 60 L with an FiO2 of 95% in addition to a 

nonrebreather facemask.  He is awake and alert.  He is short of breath with 

limited amount of activity and he easily desaturates.  No significant chest 

pain.  Cough with deep breathing.  The patient has already received convalescent

plasma.  The patient is currently on IV Solu-Medrol 60 mg every 6 hours of this 

in addition to multivitamins. He was also treated with Tocilizumab.  LDH level 

from yesterday was 1157 and the CRP was 8.1.  Chest x-ray still unchanged with 

diffuse bilateral pulmonary infiltrates.  This patient otherwise has been in 

good state of health.  No chronic illnesses otherwise.  He is being monitored 

very closely here in the intensive care unit due to concerns of progressive 

respiratory failure requiring intubation mechanical ventilation.  Blood cultures

of been negative thus far.





On 04/13/2021, the patient is being seen for a follow-up.  This is a case of 

Coumadin related pneumonia with hypoxic respiratory failure.  The patient was on

high flow oxygen yesterday and he was on 60 L with an FiO2 of 95%.  He was also 

using the nonrebreather facemask in addition to the high flow oxygen.  Was doing

some activity and movement yesterday, the patient desaturated down to the 60s 

and he was hard to recovered.  At this time, the patient became quite restless 

and agitated.  He was placed on a BiPAP briefly which made him more panicky 

uncomfortable.  Overnight, he was also started on Precedex which is currently ru

nning at 0.2 mcg/kg per minute.  Ultimately, he was taken off the BiPAP and the 

patient is currently back on high flow oxygen at 60 L with a 93% in addition to 

100% nonrebreather facemask.  His current pulse ox is order of 76-82 percent.  

The chest x-ray from today is showing bilateral lower lobe pulmonary infiltrates

and compared to yesterday's chest x-ray, there is no major interval change.  Fin

dings and essentially stable.  Meanwhile, his blood work from today is showing a

white cell count of 15.8, he does have lymphopenia, renal function is stable, 

LDH level is 1-15 and the CRP is 7.4.  I also check a pro-calcitonin level on 

him yesterday and the level came back low at 0.08.  Meanwhile, the patient is 

currently on Solu-Medrol 60 mg every 6 hours he is also on Lovenox 40 mg subcu 

every 24 hours.  The patient remains on Precedex for now.  His calm and 

comfortable and responsive.





04/14/2021, the patient is having difficulty with hypoxemia and worsening 

shortness of breath.  Note that after being on high flow oxygen for quite some 

time, the patient started showing some signs of decompensated yesterday.  His 

pulse ox was dropping in the mid and low 80s and he was very slow in recovering.

 Based on that, he was switched to a BiPAP which is currently running at a BiPAP

pressure of 14/5 cm of water with an FiO2 of 100%.  Despite all this, he remains

tachypneic.  The patient prior volume is above 1 L and his respiratory rate is 

in the mid 40s and is generating a very high risk ventilation even without the 

BiPAP.  He is quite tachypneic.  Pulse ox currently is in the low 70s.  He is on

Precedex.  He is very anxious.  His panicky.  He is restless. Precedex is 

running at micrograms per kilogram per minute.  As far as his treatment, the 

patient remains on IV Solu Medrol 60 mg every 6 hours.  He is also on Lovenox 40

mg subcu every 24 hours.  Chest x-ray from today is showing stable bilateral 

pulmonary infiltrates, essentially unchanged compared to yesterday.  Doppler of 

the lower extremities was done yesterday showed no evidence of DVT.  His 

inflammatory markers today is showing a d-dimer of 4.1 from yesterday.  LDH is 

01/04/2009, higher, CRP is 5.7, able.  Rest of the electrodes are stable, BUN is

40 with a creatinine of 0.8.  His white cell count is currently at 20 and 

hemoglobin is at 16.  His net fluid balance has been -2.6 L and the patient was 

given Lasix yesterday and the patient was Negative Fluid Balance.





04/15/2021 the patient is being seen for a follow-up.  He obviously not 

intubated yesterday as the patient calmed down considerably with utilization of 

Precedex and morphine.  Nevertheless, his sister status remains borderline and 

currently is still on a BiPAP at a pressure of 16/12 and FiO2 of 100% and the 

patient is also on Precedex and points Precedex at 0.7 mcg/kg per minute and 

morphine as needed.  He is not was essentially uneventful.  His current pulse ox

is around 89%.  The easily desaturates.  He had several events yesterday with 

his pulse ox dropped to lower levels and is taking them quite some time until he

recovers probably in the order of 30 minutes to 45 minutes.  During this time 

the patient becomes quite panicky restless and short of breath.  We have still 

avoided intubation on this patient and were continuing the supportive care with 

noninvasive positive pressure ventilation.  The chest x-ray remains stable 

without any interval change and there is some lower lobe at the pulmonary 

infiltrates.  The patient's white cell count is 18.4 with a hemoglobin of 16.8. 

His electrolytes are normal, renal function is showing a stable creatinine of 

0.9, rest of the inflammatory markers are still pending for now.  Fluid balance 

has been in the order of -2.6 L for yesterday and the patient is headed towards 

more negative fluid balance for today.  The patient remains on IV Solu-Medrol 60

mg every 6 hours.  He is on Lovenox therapeutic dose of 100 mg by mouth every 12

hours therapeutic dose as well as utilized as the patient was getting 

progressively more hypoxic and pulmonary embolism was a constellation.  The 

patient was unable to undergo a CT angiogram because of his very limited 

pulmonary reserve and borderline respiratory status.  Doppler of the lower 

extremities were obviously negative.  No signs of bleeding and was going to 

continue the therapy goes of Lovenox for another 24 hours.  D-dimer from today 

is pending for now.  Clinically, he is resting comfortably in bed.  His night 

was otherwise uneventful.  A Devlin catheter was also inserted yesterday is 

limited his mobility.





04/16/2021, the patient remains on a BiPAP of 16/12 cm of water with an FiO2 of 

100%.  His current respiratory rate is in the low 30s.  He seems to be 

comfortable, he had very much dependent on a BiPAP and the patient has limited r

eserve and easily desaturates.  His been on BiPAP for the past 3 days for now.  

This is making his nose irritated in the mouth dry.  The chest x-ray from today 

still pending.  Yesterday's chest x-ray was reviewed and the findings were 

essentially stable.  To control his increased level of anxiety and synchrony 

with the noninvasive positive pressure ventilator, and without Precedex which is

currently running at 0.7 mcg/kg per minute and this has done significant 

improvement in terms of lowering his respiratory rate and taking control of his 

anxiety.  He is on TPN for nutritional support for now.  He has a PICC line in 

his left upper extremity.  In terms of therapy, he remains on Lovenox 1 mg every

every 12 hours.  He remains on IV Solu Medrol 60 mg IV every 6 hours.  

Inflammatory markers on today's evaluation showed a d-dimer of 1.77, his LDH 

level is 1371 and his CRP level is at 6.3.  His electrolytes are all within 

normal limits.  His fluid balance over the past 24 hours is negative for 54 mL 

and the patient was being diuresed with Lasix.  He does not have a lazy 

extending dose for now.  He was also given Diflucan regarding the possibility of

some oropharyngeal candidiasis.  IV fluids currently running at 20 mL an hour.  

TPN is currently running at 30 mL an hour and the patient currently has a Devlin 

catheter in place.





04/17/2021, the patient is still on BiPAP.  He was on BiPAP overnight and is 

getting quite anxious and tired of being on the BiPAP.  Current BiPAP settings 

of 16/12 an FiO2 of 100%.  Current pulse ox is ranging between 84 and 87%.  He 

wants to try to high flow oxygen system again.  His white cell count is at 31.  

His d-dimer is at 1.5.  His LDH level is 1533 and his CRP level is at 0.5.  His 

chest x-ray from today is showing infiltrates in lung bases bilaterally, 

essentially unchanged compared to the yesterday's chest film.  I was told by the

nursing staff that even while receiving oral care.  He is still on Precedex at 

0.7 mcg/kg per minute.  He remains on IV Solu Medrol 60 mg every 6 hours.  I 

will today's condition essentially unchanged..  Have some oropharyngeal 

candidiasis for which she was started on Diflucan.  He remains on TPN for 

nutritional support which is running at 30 mL an hour.  He is awake and alert 

and is, indicating.  Altered mentation.  Found the week.  Quite discouraged 

because of his ongoing respiratory failure.  Unable to use a incentive 

spirometer as the patient is currently strictly on BiPAP.  He is not 

tachycardic.  His current respiratory rate is in the mid 20s.  He is able to 

generate higher tidal volumes and his minute ventilation continues to be quite 

elevated at 30





04/18/2021, the patient remains unchanged and this is quite discouraging as the 

patient is not doing any significant progress.  He remains very much BiPAP 

dependent.  Yesterday with ecchymosis the BiPAP for 5 minutes and he 

decompensated with his pulse ox dropped down to the low 60s and he became 

extremely tachypneic.  Even now, with movement and limited activity, the patient

desaturates and is taken in quite some time to recover.  His chest x-ray still 

showing bilateral lower lobe pulmonary infiltrates consistent with COVID-19 

related pneumonia/ARDS.  The patient remains on BiPAP this morning at a pressure

of 16/12 with an FiO2 of 100%.  LDH level remains elevated at 1486 and the CRP 

level is down to 0.7.  The d-dimer currently is at 1.6.  It is of treatment, the

patient is on IV Solu Medrol 60 mg every 6 hours.  He remains on Precedex at 0 

point have and microvascular kilogram per minute.  Precedex has made a much more

comfortable and less agitated.  He is awake.  Is arousable.  He cannot eat.  He 

is on TPN for nutritional support.  He was started on Diflucan for some 

oropharyngeal candidiasis.  TPN is running at the rate of 85 mL an hour.  In 

terms of his blood work and labs, white cell count was 25 and a platelet count 

was 198 slightly lower, his electrolytes are all within normal limits.  Total 

protein is down to 5 with albumin level of 2.6.  His triglyceride level is at 

379.  He is weak.  He is in bed most of the time.  Unable to stop on a chair.  

As mentioned, he carries a high risk of failing respiratory status requiring 

intubation mechanical ventilation.





Objective





- Vital Signs


Vital signs: 


                                   Vital Signs











Temp  97.9 F   04/18/21 04:00


 


Pulse  73   04/18/21 07:00


 


Resp  24   04/18/21 07:00


 


BP  136/82   04/18/21 07:00


 


Pulse Ox  82 L  04/18/21 07:00








                                 Intake & Output











 04/17/21 04/18/21 04/18/21





 18:59 06:59 18:59


 


Intake Total 406.01 220 20


 


Output Total 950 740 70


 


Balance -543.99 -520 -50


 


Weight  97.5 kg 


 


Intake:   


 


   220 20


 


    Sodium Chloride 0.9% 1, 260 220 20





    000 ml @ 20 mls/hr IV .   





    Q24H SHAZIA Rx#:[id number]   


 


  Intake, IV Titration 146.01  





  Amount   


 


    Dexmedetomidine/0.9% NaCl 96.01  





    (Pmx) 400 mcg In Empty   





    Bag 1 bag @ Titrate IV .   





    Q0M SHAZIA Rx#:[id number]   


 


    Fluconazole in NaCl,Iso- 50  





    Osm 100 mg In Saline 1   





    50ml.bag @ 50 mls/hr IVPB   





    DAILY SHAZIA Rx#:[id number]   


 


Output:   


 


  Urine 950 740 70


 


Other:   


 


  Voiding Method Indwelling Catheter Indwelling Catheter 














- Exam








GENERAL EXAM: Alert, pleasant 50-year-old gentleman, patient is in obvious 

respiratory failure on a BiPAP of 16/12, tachypneic, tachycardic, restless, 

using excessive muscle breathing and the patient's pulse is currently is in the 

high 80s for now.  He is laying supine.  Unable to prone the patient.  Awake and

alert.  Communicating.  He remains rashes.  He feels less short of breath 

compared to yesterday.  Continues to have elevated minute ventilation while 

being on a BiPAP.


HEAD: Normocephalic.


EYES: Normal reaction of pupils, equal size.


NOSE: Clear with pink turbinates.


THROAT: No erythema or exudates.


NECK: No masses, no JVD.


CHEST: No chest wall deformity.


LUNGS: Equal air entry with basilar crackles.  Very much tachypneic and the 

patient is using excessive muscle breathing


CVS: S1 and S2 normal with no audible murmur, regular rhythm.


ABDOMEN: No hepatosplenomegaly, normal bowel sounds, no guarding or rigidity.


SPINE: No scoliosis or deformity


SKIN: No rashes


CENTRAL NERVOUS SYSTEM: No focal deficits, tone is normal in all 4 extremities.


EXTREMITIES: There is no peripheral edema.  No clubbing, no cyanosis.  

Peripheral pulses are intact., on Precedex, able to follow some simple commands.

 His appropriate and he follows commands and answers questions adequately.  

Precedex is running at 0.7.  No focal neurological deficit at this point in 

time.  He is alert and oriented 3.





- Labs


CBC & Chem 7: 


                                 04/18/21 03:28





                                 04/18/21 03:24


Labs: 


                  Abnormal Lab Results - Last 24 Hours (Table)











  04/17/21 04/17/21 04/17/21 Range/Units





  11:50 17:39 23:59 


 


WBC     (3.8-10.6)  k/uL


 


Neutrophils #     (1.3-7.7)  k/uL


 


Lymphocytes #     (1.0-4.8)  k/uL


 


D-Dimer     (<0.60)  mg/L FEU


 


BUN     (9-20)  mg/dL


 


Glucose     (74-99)  mg/dL


 


POC Glucose (mg/dL)  194 H  200 H  222 H  (75-99)  mg/dL


 


ALT     (4-49)  U/L


 


Lactate Dehydrogenase     (313-618)  U/L


 


Total Protein     (6.3-8.2)  g/dL


 


Albumin     (3.5-5.0)  g/dL














  04/18/21 04/18/21 04/18/21 Range/Units





  03:24 03:24 03:28 


 


WBC    25.0 H  (3.8-10.6)  k/uL


 


Neutrophils #    23.9 H  (1.3-7.7)  k/uL


 


Lymphocytes #    0.3 L  (1.0-4.8)  k/uL


 


D-Dimer  1.60 H    (<0.60)  mg/L FEU


 


BUN   31 H   (9-20)  mg/dL


 


Glucose   138 H   (74-99)  mg/dL


 


POC Glucose (mg/dL)     (75-99)  mg/dL


 


ALT   138 H   (4-49)  U/L


 


Lactate Dehydrogenase   1486 H   (313-618)  U/L


 


Total Protein   5.0 L   (6.3-8.2)  g/dL


 


Albumin   2.6 L   (3.5-5.0)  g/dL














  04/18/21 Range/Units





  06:31 


 


WBC   (3.8-10.6)  k/uL


 


Neutrophils #   (1.3-7.7)  k/uL


 


Lymphocytes #   (1.0-4.8)  k/uL


 


D-Dimer   (<0.60)  mg/L FEU


 


BUN   (9-20)  mg/dL


 


Glucose   (74-99)  mg/dL


 


POC Glucose (mg/dL)  153 H  (75-99)  mg/dL


 


ALT   (4-49)  U/L


 


Lactate Dehydrogenase   (313-618)  U/L


 


Total Protein   (6.3-8.2)  g/dL


 


Albumin   (3.5-5.0)  g/dL














Assessment and Plan


Plan: 








1 Acute hypoxic respiratory failure secondary to CoVID 19 pneumonia.  Outside 

the window for Remdesivir.  Did receive tocilizumab and 1 unit of convalescent 

plasma.    Based on that the patient was placed on noninvasive positive pressure

ventilation the patient continues to be on BiPAP at a pressure of 16/12 an FiO2 

of 100%.  The patient has been on the BiPAP for the past 5 days at least.  He is

on Precedex.  He is also on morphine.  The chest x-ray is unchanged.  LDH level 

is elevated.  Unable to come off the BiPAP.  Very much BiPAP dependent.  Any 

minor activity will make this patient desaturate and is having prolonged 

recovery time.  As such, his chest x-ray remains unchanged and his overall 

pulmonary status I would say has been unchanged for the past 4 days.  He is 

quite discouraging to the patient as he has become BiPAP dependent for quite 

some time.





2 History of chronic tobacco dependence





3 elevated inflammatory markers secondary to Covid 19 infection





4 leukocytosis





5 Diflucan for oropharyngeal candidiasis





6.  TPN for nutritional support





7 debility and muscle weakness secondary to above-mentioned comorbidities





Plan: 


 


Continue BiPAP for respiratory support, currently on BiPAP of 16/12 cm of water 

and FiO2 is at 100%


Switch this patient with Decadron 6 mg IV every 24 hours and stop the IV Solu-

Medrol.


currently on Precedex for sedation and morphine 


Drop the Lovenox dose of 40 mg subcu every 24 hours


Encouraged again frequent position changes, including prone positioning if 

possible 


 TPN for nutritional support 


Chest x-ray is unchanged


Check pro calcitonin level  was low


continue the rest of the supportive care.  Condition is critical.  Very high 

risk for further respiratory insufficiency and failure requiring intubation 

mechanical ventilation.  I have not seen any much progress on this patient over 

the past 3-4 days.  In fact he is progressively getting more weak and this is 

affecting his mental status as the patient is becoming more anxious and upset 

regarding his ongoing status.





Critical care time >30 minutes.

## 2021-04-18 NOTE — XR
EXAMINATION TYPE: XR chest 1V portable

 

DATE OF EXAM: 4/18/2021

 

CLINICAL HISTORY: Difficulty breathing progress study.  

 

TECHNIQUE: Single AP portable upright view of the chest is obtained.

 

COMPARISON: Chest x-ray from one day earlier and older studies

 

FINDINGS:  Stable left-sided PICC line. 

 

Persistent opacities right greater than left lower lungs is redemonstrated. Cardiac silhouette size i
s stable and upper limits of normal. Osseous structures are intact. 

 

IMPRESSION: Persistent bibasilar opacities consistent with covid-19 infection. No significant interva
l change from one day earlier.

## 2021-04-19 LAB
ALBUMIN SERPL-MCNC: 2.8 G/DL (ref 3.5–5)
ALP SERPL-CCNC: 160 U/L (ref 38–126)
ALT SERPL-CCNC: 219 U/L (ref 4–49)
ANION GAP SERPL CALC-SCNC: 4 MMOL/L
AST SERPL-CCNC: 90 U/L (ref 17–59)
BASOPHILS # BLD AUTO: 0.1 K/UL (ref 0–0.2)
BASOPHILS NFR BLD AUTO: 0 %
BUN SERPL-SCNC: 32 MG/DL (ref 9–20)
CALCIUM SPEC-MCNC: 8.3 MG/DL (ref 8.4–10.2)
CHLORIDE SERPL-SCNC: 104 MMOL/L (ref 98–107)
CK SERPL-CCNC: 65 U/L (ref 55–170)
CO2 BLDA-SCNC: 25 MMOL/L (ref 19–24)
CO2 BLDA-SCNC: 30 MMOL/L (ref 19–24)
CO2 BLDA-SCNC: 32 MMOL/L (ref 19–24)
CO2 BLDA-SCNC: 32 MMOL/L (ref 19–24)
CO2 SERPL-SCNC: 29 MMOL/L (ref 22–30)
EOSINOPHIL # BLD AUTO: 0.1 K/UL (ref 0–0.7)
EOSINOPHIL NFR BLD AUTO: 0 %
ERYTHROCYTE [DISTWIDTH] IN BLOOD BY AUTOMATED COUNT: 5.83 M/UL (ref 4.3–5.9)
ERYTHROCYTE [DISTWIDTH] IN BLOOD: 13.1 % (ref 11.5–15.5)
FERRITIN SERPL-MCNC: 2442.6 NG/ML (ref 22–322)
GLUCOSE BLD-MCNC: 105 MG/DL (ref 75–99)
GLUCOSE BLD-MCNC: 106 MG/DL (ref 75–99)
GLUCOSE BLD-MCNC: 117 MG/DL (ref 75–99)
GLUCOSE BLD-MCNC: 150 MG/DL (ref 75–99)
GLUCOSE SERPL-MCNC: 191 MG/DL (ref 74–99)
HCO3 BLDA-SCNC: 24 MMOL/L (ref 21–25)
HCO3 BLDA-SCNC: 27 MMOL/L (ref 21–25)
HCO3 BLDA-SCNC: 29 MMOL/L (ref 21–25)
HCO3 BLDA-SCNC: 29 MMOL/L (ref 21–25)
HCT VFR BLD AUTO: 52.4 % (ref 39–53)
HGB BLD-MCNC: 16.7 GM/DL (ref 13–17.5)
LDH SPEC-CCNC: 2094 U/L (ref 313–618)
LYMPHOCYTES # SPEC AUTO: 0.6 K/UL (ref 1–4.8)
LYMPHOCYTES NFR SPEC AUTO: 1 %
MAGNESIUM SPEC-SCNC: 2.1 MG/DL (ref 1.6–2.3)
MCH RBC QN AUTO: 28.7 PG (ref 25–35)
MCHC RBC AUTO-ENTMCNC: 31.9 G/DL (ref 31–37)
MCV RBC AUTO: 90 FL (ref 80–100)
MONOCYTES # BLD AUTO: 1.3 K/UL (ref 0–1)
MONOCYTES NFR BLD AUTO: 2 %
NEUTROPHILS # BLD AUTO: 53.1 K/UL (ref 1.3–7.7)
NEUTROPHILS NFR BLD AUTO: 96 %
PCO2 BLDA: 100 MMHG (ref 35–45)
PCO2 BLDA: 101 MMHG (ref 35–45)
PCO2 BLDA: 45 MMHG (ref 35–45)
PCO2 BLDA: 88 MMHG (ref 35–45)
PCO2 BLDA: >120 MMHG (ref 35–45)
PH BLDA: 6.97 [PH] (ref 7.35–7.45)
PH BLDA: 7.06 [PH] (ref 7.35–7.45)
PH BLDA: 7.06 [PH] (ref 7.35–7.45)
PH BLDA: 7.1 [PH] (ref 7.35–7.45)
PH BLDA: 7.33 [PH] (ref 7.35–7.45)
PLATELET # BLD AUTO: 268 K/UL (ref 150–450)
PO2 BLDA: 27 MMHG (ref 83–108)
PO2 BLDA: 55 MMHG (ref 83–108)
PO2 BLDA: 58 MMHG (ref 83–108)
PO2 BLDA: 64 MMHG (ref 83–108)
PO2 BLDA: 71 MMHG (ref 83–108)
POTASSIUM SERPL-SCNC: 4.4 MMOL/L (ref 3.5–5.1)
PROT SERPL-MCNC: 5.5 G/DL (ref 6.3–8.2)
SODIUM SERPL-SCNC: 137 MMOL/L (ref 137–145)
WBC # BLD AUTO: 55.3 K/UL (ref 3.8–10.6)

## 2021-04-19 PROCEDURE — 3E0G76Z INTRODUCTION OF NUTRITIONAL SUBSTANCE INTO UPPER GI, VIA NATURAL OR ARTIFICIAL OPENING: ICD-10-PCS

## 2021-04-19 PROCEDURE — 3E033XZ INTRODUCTION OF VASOPRESSOR INTO PERIPHERAL VEIN, PERCUTANEOUS APPROACH: ICD-10-PCS

## 2021-04-19 PROCEDURE — 0D9670Z DRAINAGE OF STOMACH WITH DRAINAGE DEVICE, VIA NATURAL OR ARTIFICIAL OPENING: ICD-10-PCS

## 2021-04-19 PROCEDURE — 0BH17EZ INSERTION OF ENDOTRACHEAL AIRWAY INTO TRACHEA, VIA NATURAL OR ARTIFICIAL OPENING: ICD-10-PCS

## 2021-04-19 PROCEDURE — 5A1945Z RESPIRATORY VENTILATION, 24-96 CONSECUTIVE HOURS: ICD-10-PCS

## 2021-04-19 RX ADMIN — NOREPINEPHRINE BITARTRATE SCH MLS/HR: 1 INJECTION, SOLUTION, CONCENTRATE INTRAVENOUS at 20:43

## 2021-04-19 RX ADMIN — SODIUM CHLORIDE SCH MLS/HR: 9 INJECTION, SOLUTION INTRAVENOUS at 23:29

## 2021-04-19 RX ADMIN — ALBUTEROL SULFATE PRN PUFF: 90 AEROSOL, METERED RESPIRATORY (INHALATION) at 09:56

## 2021-04-19 RX ADMIN — CISATRACURIUM BESYLATE SCH MLS/HR: 10 INJECTION INTRAVENOUS at 04:07

## 2021-04-19 RX ADMIN — INSULIN ASPART SCH UNIT: 100 INJECTION, SOLUTION INTRAVENOUS; SUBCUTANEOUS at 06:29

## 2021-04-19 RX ADMIN — ENOXAPARIN SODIUM SCH MG: 40 INJECTION SUBCUTANEOUS at 09:06

## 2021-04-19 RX ADMIN — NOREPINEPHRINE BITARTRATE SCH MLS/HR: 1 INJECTION, SOLUTION, CONCENTRATE INTRAVENOUS at 08:53

## 2021-04-19 RX ADMIN — Medication SCH MCG: at 09:05

## 2021-04-19 RX ADMIN — INSULIN ASPART SCH UNIT: 100 INJECTION, SOLUTION INTRAVENOUS; SUBCUTANEOUS at 01:16

## 2021-04-19 RX ADMIN — Medication SCH MG: at 09:05

## 2021-04-19 RX ADMIN — OXYCODONE HYDROCHLORIDE AND ACETAMINOPHEN SCH MG: 500 TABLET ORAL at 09:06

## 2021-04-19 RX ADMIN — INSULIN ASPART SCH: 100 INJECTION, SOLUTION INTRAVENOUS; SUBCUTANEOUS at 12:48

## 2021-04-19 RX ADMIN — CHLORHEXIDINE GLUCONATE SCH ML: 1.2 RINSE ORAL at 09:05

## 2021-04-19 RX ADMIN — CEFAZOLIN SCH MLS/HR: 330 INJECTION, POWDER, FOR SOLUTION INTRAMUSCULAR; INTRAVENOUS at 14:55

## 2021-04-19 RX ADMIN — MORPHINE SULFATE PRN MG: 4 INJECTION, SOLUTION INTRAMUSCULAR; INTRAVENOUS at 00:01

## 2021-04-19 RX ADMIN — INSULIN ASPART SCH: 100 INJECTION, SOLUTION INTRAVENOUS; SUBCUTANEOUS at 18:25

## 2021-04-19 RX ADMIN — NOREPINEPHRINE BITARTRATE SCH MLS/HR: 1 INJECTION, SOLUTION, CONCENTRATE INTRAVENOUS at 22:52

## 2021-04-19 RX ADMIN — MORPHINE SULFATE PRN MG: 4 INJECTION, SOLUTION INTRAMUSCULAR; INTRAVENOUS at 01:41

## 2021-04-19 RX ADMIN — DEXTROSE SCH MG: 50 INJECTION, SOLUTION INTRAVENOUS at 09:06

## 2021-04-19 RX ADMIN — CEFEPIME HYDROCHLORIDE SCH MLS/HR: 2 INJECTION, POWDER, FOR SOLUTION INTRAVENOUS at 17:54

## 2021-04-19 RX ADMIN — SODIUM CHLORIDE SCH MLS/HR: 900 INJECTION, SOLUTION INTRAVENOUS at 19:45

## 2021-04-19 RX ADMIN — DEXMEDETOMIDINE HYDROCHLORIDE SCH MLS/HR: 4 INJECTION, SOLUTION INTRAVENOUS at 01:16

## 2021-04-19 RX ADMIN — CISATRACURIUM BESYLATE SCH MLS/HR: 10 INJECTION INTRAVENOUS at 16:59

## 2021-04-19 RX ADMIN — PANTOPRAZOLE SODIUM SCH MG: 40 INJECTION, POWDER, FOR SOLUTION INTRAVENOUS at 09:06

## 2021-04-19 RX ADMIN — SODIUM CHLORIDE SCH MLS/HR: 9 INJECTION, SOLUTION INTRAVENOUS at 16:12

## 2021-04-19 RX ADMIN — ALBUTEROL SULFATE PRN PUFF: 90 AEROSOL, METERED RESPIRATORY (INHALATION) at 12:20

## 2021-04-19 RX ADMIN — CHLORHEXIDINE GLUCONATE SCH ML: 1.2 RINSE ORAL at 20:12

## 2021-04-19 RX ADMIN — SODIUM CHLORIDE SCH MLS/HR: 900 INJECTION, SOLUTION INTRAVENOUS at 06:23

## 2021-04-19 RX ADMIN — NOREPINEPHRINE BITARTRATE SCH MLS/HR: 1 INJECTION, SOLUTION, CONCENTRATE INTRAVENOUS at 14:31

## 2021-04-19 RX ADMIN — DEXTRAN 70 AND HYPROMELLOSE 2910 SCH DROPS: 1; 3 SOLUTION/ DROPS OPHTHALMIC at 23:17

## 2021-04-19 RX ADMIN — TAMSULOSIN HYDROCHLORIDE SCH MG: 0.4 CAPSULE ORAL at 09:06

## 2021-04-19 NOTE — P.PN
Subjective


Progress Note Date: 04/19/21


Malcom Griffith is a 49 yo M with PMH of allergies who presented to the ED via EMS

with worsening malaise and shortness of breath. He states he developed a cough 

and sore throat about 2 weeks ago which has worsened since then. He complains of

fever chills and shortness of breath. On arrival he was febrile tachypenic and 

hypoxic SpO2 94% on 4 L O2. WBC 5.8, lymphopenia, , , COVID 

positive, CXR with coarse bilateral infiltrates.





[id number] maintained on 90% airflow, O2 sats in the low 90s.  Positive 

nonproductive cough .  Outside window for Remdesevir, continue on steroids, 

vitamins,lovenox. Chest pain, palpitations, complains of chronic back pain.  T-

max 100.2.  Preliminary blood cultures reporting no growth at 24 hours





04/05/2021 status post convalescent plasma, TOCI, maintaining O2 sats in the 90s

on 100% BiPAP.  Chest x-ray reporting stable diffuse bilateral infiltrates.  

Afebrile, T-max 99.2, WBC 14.7.  Hemoglobin 15.7, platelets 321.  BUN 41, 

creatinine 0.94. D-dimer 1.45, ferritin 1511.4, LDH 1025, CRP 54.1.





04/06/2021 alternating between BiPAP and 15 L high flow nasal cannula combined 

with nonrebreather, maintaining O2 sats in the high 80s.  Chest x-ray reporting 

diffuse bilateral infiltrates stable. T-max 99,WBC 14.  Hemoglobin 14.7, 

platelets 328 D-dimer 1.76, ferritin and CRP decreased.  BUN 39, creatinine 

0.91.








04/07/2021 maintained on 15 L-nasal cannula with  nonrebreather mask, IV 

steroids, bronchodilators,maintaining O2 sats in the high 80s.  Did not Require 

BiPAP during the night.  Feels better.  Nonproductive cough.  Complains of mild 

chest tightness with coughing. Chest x-ray reporting stable diffuse bilateral 

infiltrate.  Afebrile, WBC 13.6.  Hemoglobin 14.8, platelets 300. BUN 33, 

creatinine 0.86.  Blood sugars controlled. LDH elevated, 1127, CRP down to 17.6.










04/08/2021  continue on IV steroids, vitamins, maintaining O2 sats in the mid to

high 80s on 15 L nasal cannula plus nonrebreather.  Nonproductive cough.  

Reports she feels better.  Chest x-ray reporting no change in bilateral 

infiltrates.  Afebrile.  Creatinine 0.72.





04/09/2021 Continues on airflow high flow oxygen at 60 L/m and 90% FiO2 along 

with a nonrebreather mask, maintaining O2 sats in the high 80s.   Minimal cough.

 Complains of dry mouth.  He reports, continues to feel better.  Afebrile, WBC 

down to 12.2.  D-dimer, CRP decreased.  LDH increased ,1196.





04/12/2021 maintained on high flow nasal cannula 60 L in addition to 

nonrebreather maintaining better O2 sats  in the high 90s to 100%.  Chest x-ray 

reporting stable bilateral infiltrates .complains of exertional shortness of 

breath. Afebrile, T-max 99.5, WBC 19.  D-dimer, ferritin, LDH  decreased, CRP 

8.1.  BUN 25, creatinine 0.75.  Scheduled to have midline placed today








04/13/2021 rough night, Precedex initiated and patient placed on BiPAP.  This 

morning ,desating down into the 60s, on BiPAP.FiO2 adjusted, with reported O2 

sats improved into the 80s.Chest x-ray reporting stable  patchy bilateral Inf

iltrates.  Doppler reporting negative for DVT of bilateral lower extremities.  

Inflammatory markers elevated.








04/14/2021 difficulty oxygenating yesterday, converted over to BiPAP, currently 

on 100% FiO2 maintaining O2 sats of 63 to low 80s. Unstable to travel for CTA. 

Maintained on Lovenox . Continues on Precedex, covid cocktail, including IV 

Solu-Medrol.Chest x-ray reporting similar to prior exam.  Received Lasix 

yesterday, diuresed well with 24-hour I&O reflecting a negative fluid balance.  

BUN 40, creatinine 0.88.  Afebrile, WBC 20. 








04/15/2021 anxiety significantly improved on Precedex and morphine.  Maintaining

O2 sats in the high 80s on 100% BiPAP pressure 16/12.  Chest x-ray reporting 

some slight improved aeration at the right lung base.  





Therapeutic dosed Lovenox; unstable to travel for CTA to rule out PE .Afebrile, 

WBC 18.4.  Diuresing and remains in a negative fluid balance.  BUN 50, 

creatinine 0.99.








04/16/2021 continues to require BiPAP 100% FiO2, maintaining O2 sats high 80s to

low 90s.  D-dimer 1.77. therapeutic dosed Lovenox decreased. LDH 1371, CRP 6.3. 

24-hour I&O remains in a negative fluid balance. BUN 47, creatinine 0.78.   TPN 

for nutritional support ,Blood sugars controlled.T bili 1.4, ALT 1:15.  

Afebrile, WBC 21.3.








04/19/2021 worsening respiratory status with hypoxia throughout the night, 

requiring intubation in the early morning hours. Vent dependent, FiO2 100%, FiO2

20 %, ABGs noted.  Chest x-ray reporting unchanged patchy bibasilar opacities 

right greater than left.  Maintained on Nimbex, fentanyl, Levophed and diprovan 

drips and fluid bolus.  WBC up to 55.3 ,continues on Eraxis, cefepime and 

vancomycin.  Serial ABGs noted.





Objective





- Vital Signs


Vital signs: 


                                   Vital Signs











Temp  98.2 F   04/19/21 12:00


 


Pulse  129 H  04/19/21 15:00


 


Resp  40 H  04/19/21 15:00


 


BP  139/87   04/19/21 11:00


 


Pulse Ox  91 L  04/19/21 15:00








                                 Intake & Output











 04/18/21 04/19/21 04/19/21





 18:59 06:59 18:59


 


Intake Total 5738.419 1728.158 6337.768


 


Output Total 760 780 195


 


Balance 654.804 172.205 2837.768


 


Weight  98.8 kg 98.8 kg


 


Intake:   


 


   1167 4777


 


    Sodium Acetate 30 meq  935 





    Potassium Phosphate 9   





    mmol Calcium Gluconate 0.   





    5 gm In Amino Acids 5 %/   





    Dextrose 20 % 1,000 ml @   





    85 mls/hr IV .BY DURATION   





    [facility] Rx#:[id number]   


 


    Sodium Chloride 0.9% 1, 260 220 750





    000 ml @ 100 mls/hr IV .   





    Q10H [facility] Rx#:[id number]   


 


    Sodium Chloride 0.9% 1,   4000





    000 ml @ 999 mls/hr IV .   





    Q1H1M [facility] Rx#:[id number]   


 


    pressure bag  12 27


 


  Intake, IV Titration 1154.804 882.736 0342.768





  Amount   


 


    Cisatracurium 200 mg In  16.104 





    Sodium Chloride 0.9% 180   





    ml @ 1 MCG/KG/MIN 5.928   





    mls/hr IV .Q24H [facility] Rx#:   





    [id number]   


 


    Dexmedetomidine/0.9% NaCl 70.804 164.573 





    (Pmx) 400 mcg In Empty   





    Bag 1 bag @ Titrate IV .   





    Q0M [facility] Rx#:[id number]   


 


    Fluconazole in NaCl,Iso- 50  





    Osm 100 mg In Saline 1   





    50ml.bag @ 50 mls/hr IVPB   





    [facility] Rx#:[id number]   


 


    Mvi, Adult No.4 with Vit 1034  





    K 10 ml Trace (Conc-1Ml/   





    Dose) 1 ml Sodium Acetate   





    30 meq Potassium   





    Phosphate 9 mmol Calcium   





    Gluconate 0.5 gm In Amino   





    Acids 5 %/Dextrose 20 %   





    1,000 ml @ 85 mls/hr IV .   





    BY DURATION [facility] Rx#:   





    [id number]   


 


    Norepinephrine 4 mg In   254.000





    Sodium Chloride 0.9% 250   





    ml @ 0.05 MCG/KG/MIN 18.   





    821 mls/hr IV .[id number]   





    [facility] Rx#:[id number]   


 


    Sodium Acetate 30 meq   1023





    Potassium Phosphate 9   





    mmol Calcium Gluconate 0.   





    5 gm In Amino Acids 5 %/   





    Dextrose 20 % 1,000 ml @   





    85 mls/hr IV .BY DURATION   





    [facility] Rx#:[id number]   


 


    propofoL 1,000 mg In  104.481 283.768





    Empty Bag 1 bag @ Titrate   





    IV .Q0M [facility] Rx#:   





    [id number]   


 


Output:   


 


  Urine 760 780 195


 


Other:   


 


  Voiding Method Indwelling Catheter Indwelling Catheter Indwelling Catheter








                       ABP, PAP, CO, CI - Last Documented











Arterial Blood Pressure        88/58

















- Exam


Limited exam secondary to covid, full exam deferred to intensivist, in a patient

intubated





General: Sitting up in bed, intubated, sedated and on paralytics


CV: Regular rate and rhythm











- Labs


CBC & Chem 7: 


                                 04/19/21 04:15





                                 04/19/21 04:15


Labs: 


                  Abnormal Lab Results - Last 24 Hours (Table)











  04/19/21 04/19/21 04/19/21 Range/Units





  00:09 01:47 03:09 


 


WBC     (3.8-10.6)  k/uL


 


Neutrophils #     (1.3-7.7)  k/uL


 


Lymphocytes #     (1.0-4.8)  k/uL


 


Monocytes #     (0-1.0)  k/uL


 


D-Dimer     (<0.60)  mg/L FEU


 


ABG pH   7.33 L  7.10 L*  (7.35-7.45)  


 


ABG pCO2    88 H*  (35-45)  mmHg


 


ABG pO2   27 L*  64 L  ()  mmHg


 


ABG HCO3    27 H  (21-25)  mmol/L


 


ABG Total CO2   25 H  30 H  (19-24)  mmol/L


 


ABG O2 Saturation   45.5 L  82.3 L  (94-97)  %


 


BUN     (9-20)  mg/dL


 


Glucose     (74-99)  mg/dL


 


POC Glucose (mg/dL)  150 H    (75-99)  mg/dL


 


Calcium     (8.4-10.2)  mg/dL


 


Phosphorus     (2.5-4.5)  mg/dL


 


Ferritin     (22.0-322.0)  ng/mL


 


Total Bilirubin     (0.2-1.3)  mg/dL


 


AST     (17-59)  U/L


 


ALT     (4-49)  U/L


 


Alkaline Phosphatase     ()  U/L


 


Lactate Dehydrogenase     (313-618)  U/L


 


C-Reactive Protein     (<1.0)  mg/dL


 


Total Protein     (6.3-8.2)  g/dL


 


Albumin     (3.5-5.0)  g/dL














  04/19/21 04/19/21 04/19/21 Range/Units





  04:15 04:15 04:15 


 


WBC   55.3 H*   (3.8-10.6)  k/uL


 


Neutrophils #   53.1 H   (1.3-7.7)  k/uL


 


Lymphocytes #   0.6 L   (1.0-4.8)  k/uL


 


Monocytes #   1.3 H   (0-1.0)  k/uL


 


D-Dimer    12.01 H  (<0.60)  mg/L FEU


 


ABG pH     (7.35-7.45)  


 


ABG pCO2     (35-45)  mmHg


 


ABG pO2     ()  mmHg


 


ABG HCO3     (21-25)  mmol/L


 


ABG Total CO2     (19-24)  mmol/L


 


ABG O2 Saturation     (94-97)  %


 


BUN  32 H    (9-20)  mg/dL


 


Glucose  191 H    (74-99)  mg/dL


 


POC Glucose (mg/dL)     (75-99)  mg/dL


 


Calcium  8.3 L    (8.4-10.2)  mg/dL


 


Phosphorus  4.7 H    (2.5-4.5)  mg/dL


 


Ferritin  2442.6 H    (22.0-322.0)  ng/mL


 


Total Bilirubin  1.7 H    (0.2-1.3)  mg/dL


 


AST  90 H    (17-59)  U/L


 


ALT  219 H    (4-49)  U/L


 


Alkaline Phosphatase  160 H    ()  U/L


 


Lactate Dehydrogenase  2094 H    (313-618)  U/L


 


C-Reactive Protein  3.6 H    (<1.0)  mg/dL


 


Total Protein  5.5 L    (6.3-8.2)  g/dL


 


Albumin  2.8 L    (3.5-5.0)  g/dL














  04/19/21 04/19/21 04/19/21 Range/Units





  04:22 07:18 08:14 


 


WBC     (3.8-10.6)  k/uL


 


Neutrophils #     (1.3-7.7)  k/uL


 


Lymphocytes #     (1.0-4.8)  k/uL


 


Monocytes #     (0-1.0)  k/uL


 


D-Dimer     (<0.60)  mg/L FEU


 


ABG pH  7.06 L*  6.97 L*  7.06 L*  (7.35-7.45)  


 


ABG pCO2  100 H*  >120 H*  101 H*  (35-45)  mmHg


 


ABG pO2  58 L*  55 L*  71 L  ()  mmHg


 


ABG HCO3  29 H   29 H  (21-25)  mmol/L


 


ABG Total CO2  32 H   32 H  (19-24)  mmol/L


 


ABG O2 Saturation  76.6 L  71.2 L  87.7 L  (94-97)  %


 


BUN     (9-20)  mg/dL


 


Glucose     (74-99)  mg/dL


 


POC Glucose (mg/dL)     (75-99)  mg/dL


 


Calcium     (8.4-10.2)  mg/dL


 


Phosphorus     (2.5-4.5)  mg/dL


 


Ferritin     (22.0-322.0)  ng/mL


 


Total Bilirubin     (0.2-1.3)  mg/dL


 


AST     (17-59)  U/L


 


ALT     (4-49)  U/L


 


Alkaline Phosphatase     ()  U/L


 


Lactate Dehydrogenase     (313-618)  U/L


 


C-Reactive Protein     (<1.0)  mg/dL


 


Total Protein     (6.3-8.2)  g/dL


 


Albumin     (3.5-5.0)  g/dL














  04/19/21 Range/Units





  12:39 


 


WBC   (3.8-10.6)  k/uL


 


Neutrophils #   (1.3-7.7)  k/uL


 


Lymphocytes #   (1.0-4.8)  k/uL


 


Monocytes #   (0-1.0)  k/uL


 


D-Dimer   (<0.60)  mg/L FEU


 


ABG pH   (7.35-7.45)  


 


ABG pCO2   (35-45)  mmHg


 


ABG pO2   ()  mmHg


 


ABG HCO3   (21-25)  mmol/L


 


ABG Total CO2   (19-24)  mmol/L


 


ABG O2 Saturation   (94-97)  %


 


BUN   (9-20)  mg/dL


 


Glucose   (74-99)  mg/dL


 


POC Glucose (mg/dL)  117 H  (75-99)  mg/dL


 


Calcium   (8.4-10.2)  mg/dL


 


Phosphorus   (2.5-4.5)  mg/dL


 


Ferritin   (22.0-322.0)  ng/mL


 


Total Bilirubin   (0.2-1.3)  mg/dL


 


AST   (17-59)  U/L


 


ALT   (4-49)  U/L


 


Alkaline Phosphatase   ()  U/L


 


Lactate Dehydrogenase   (313-618)  U/L


 


C-Reactive Protein   (<1.0)  mg/dL


 


Total Protein   (6.3-8.2)  g/dL


 


Albumin   (3.5-5.0)  g/dL














Assessment and Plan


Assessment: 


Severe sepsis, septic shock secondary to Covid pneumonia, beyond window for 

Remdesevir.  Possible PE, unstable for transfer for CTA, maintained on Lovenox.





Acute hypoxic respiratory failure secondary to the above, mechanical vent 

dependent





Worsening leukocytosis, suspect related to #1





Possible hypovolemic shock, pressor dependent





Gastroesophageal reflux disease





Chronic low back pain





Former nicotine dependence





Medical debility, critical illness polyneuropathy




















Plan: Continue on current medication regime ,monitoring and symptomatic 

treatment.ICU management as per pulmonary/intensivist.Covid regimen. Prognosis 

guarded.

















The impression and plan of care has been dictated as directed.





:


I performed a history and examination of this patient,  discussed the same with 

the dictator.  I agree with the dictator's note ,documented as a scribe.  Any 

additional findings or plans will be noted.

## 2021-04-19 NOTE — P.PN
Subjective


Progress Note Date: 04/17/21


Principal diagnosis: 





Acute hypoxic respiratory failure secondary to COVID-19 pneumonia





Malcom Griffith is a 49 yo M with PMH of allergies who presented to the ED via EMS

with worsening malaise and shortness of breath. He states he developed a cough 

and sore throat about 2 weeks ago which has worsened since then. He complains of

fever chills and shortness of breath. On arrival he was febrile tachypenic and 

hypoxic SpO2 94% on 4 L O2. WBC 5.8, lymphopenia, , , COVID 

positive, CXR with coarse bilateral infiltrates.





[id number] maintained on 90% airflow, O2 sats in the low 90s.  Positive 

nonproductive cough .  Outside window for Remdesevir, continue on steroids, vi

tamins,lovenox. Chest pain, palpitations, complains of chronic back pain.  T-max

100.2.  Preliminary blood cultures reporting no growth at 24 hours





04/05/2021 status post convalescent plasma, TOCI, maintaining O2 sats in the 90s

on 100% BiPAP.  Chest x-ray reporting stable diffuse bilateral infiltrates.  

Afebrile, T-max 99.2, WBC 14.7.  Hemoglobin 15.7, platelets 321.  BUN 41, 

creatinine 0.94. D-dimer 1.45, ferritin 1511.4, LDH 1025, CRP 54.1.





04/06/2021 alternating between BiPAP and 15 L high flow nasal cannula combined 

with nonrebreather, maintaining O2 sats in the high 80s.  Chest x-ray reporting 

diffuse bilateral infiltrates stable. T-max 99,WBC 14.  Hemoglobin 14.7, 

platelets 328 D-dimer 1.76, ferritin and CRP decreased.  BUN 39, creatinine 

0.91.








04/07/2021 maintained on 15 L-nasal cannula with  nonrebreather mask, IV 

steroids, bronchodilators,maintaining O2 sats in the high 80s.  Did not Require 

BiPAP during the night.  Feels better.  Nonproductive cough.  Complains of mild 

chest tightness with coughing. Chest x-ray reporting stable diffuse bilateral 

infiltrate.  Afebrile, WBC 13.6.  Hemoglobin 14.8, platelets 300. BUN 33, 

creatinine 0.86.  Blood sugars controlled. LDH elevated, 1127, CRP down to 17.6.










04/08/2021  continue on IV steroids, vitamins, maintaining O2 sats in the mid to

high 80s on 15 L nasal cannula plus nonrebreather.  Nonproductive cough.  

Reports she feels better.  Chest x-ray reporting no change in bilateral 

infiltrates.  Afebrile.  Creatinine 0.72.





04/09/2021 Continues on airflow high flow oxygen at 60 L/m and 90% FiO2 along 

with a nonrebreather mask, maintaining O2 sats in the high 80s.   Minimal cough.

 Complains of dry mouth.  He reports, continues to feel better.  Afebrile, WBC 

down to 12.2.  D-dimer, CRP decreased.  LDH increased ,1196.





04/12/2021 maintained on high flow nasal cannula 60 L in addition to 

nonrebreather maintaining better O2 sats  in the high 90s to 100%.  Chest x-ray 

reporting stable bilateral infiltrates .complains of exertional shortness of 

breath. Afebrile, T-max 99.5, WBC 19.  D-dimer, ferritin, LDH  decreased, CRP 

8.1.  BUN 25, creatinine 0.75.  Scheduled to have midline placed today








04/13/2021 rough night, Precedex initiated and patient placed on BiPAP.  This 

morning ,desating down into the 60s, on BiPAP.FiO2 adjusted, with reported O2 

sats improved into the 80s.Chest x-ray reporting stable  patchy bilateral 

Infiltrates.  Doppler reporting negative for DVT of bilateral lower extremities.

 Inflammatory markers elevated.








04/14/2021 difficulty oxygenating yesterday, converted over to BiPAP, currently 

on 100% FiO2 maintaining O2 sats of 63 to low 80s. Unstable to travel for CTA. 

Maintained on Lovenox . Continues on Precedex, covid cocktail, including IV 

Solu-Medrol.Chest x-ray reporting similar to prior exam.  Received Lasix 

yesterday, diuresed well with 24-hour I&O reflecting a negative fluid balance.  

BUN 40, creatinine 0.88.  Afebrile, WBC 20. 








04/15/2021 anxiety significantly improved on Precedex and morphine.  Maintaining

O2 sats in the high 80s on 100% BiPAP pressure 16/12.  Chest x-ray reporting 

some slight improved aeration at the right lung base.  





Therapeutic dosed Lovenox; unstable to travel for CTA to rule out PE .Afebrile, 

WBC 18.4.  Diuresing and remains in a negative fluid balance.  BUN 50, 

creatinine 0.99.








04/16/2021 continues to require BiPAP 100% FiO2, maintaining O2 sats high 80s to

low 90s.  D-dimer 1.77. therapeutic dosed Lovenox decreased. LDH 1371, CRP 6.3. 

24-hour I&O remains in a negative fluid balance. BUN 47, creatinine 0.78.   TPN 

for nutritional support ,Blood sugars controlled.T bili 1.4, ALT 1:15.  

Afebrile, WBC 21.3.





4/17/2021


Patient is currently he is in MICU and on BiPAP.  Awake and alert.  Chest x-ray 

showed persistent bibasilar opacities consistent with COVID-19 infection.  No 

significant interval change from 1 day earlier.


Patient is being continued on IV Solu-Medrol 60 mg every 6 hourly.  Patient is 

being continued on TPN.


Laboratory data showed WBC 31.0 hemoglobin 17.0 platelets 267 and lymphocytes 

0.4 BUN 39 and creatinine 0.68 LDH 1533, CRP 0.5 blood sugar is controlled.





Current medications reviewed..





Objective





- Vital Signs


Vital signs: 


                                   Vital Signs











Temp  98.0 F   04/17/21 16:00


 


Pulse  60   04/17/21 16:00


 


Resp  19   04/17/21 16:00


 


BP  113/80   04/17/21 16:00


 


Pulse Ox  86 L  04/17/21 16:00








                                 Intake & Output











 04/16/21 04/17/21 04/17/21





 18:59 06:59 18:59


 


Intake Total 461.896 220 346.01


 


Output Total 750 885 775


 


Balance -288.104 -665 -428.99


 


Weight 98 kg 97.1 kg 


 


Intake:   


 


   220 200


 


    Fluconazole in NaCl,Iso- 50  





    Osm 100 mg In Saline 1   





    50ml.bag @ 50 mls/hr IVPB   





    [facility] Rx#:[id number]   


 


    Sodium Chloride 0.9% 1, 240 220 200





    000 ml @ 20 mls/hr IV .   





    Q24H [facility] Rx#:[id number]   


 


  Intake, IV Titration 171.896  146.01





  Amount   


 


    Dexmedetomidine/0.9% NaCl 141.896  





    (Pmx) 400 mcg In Empty   





    Bag 1 bag @ Titrate IV .   





    Q0M [facility] Rx#:[id number]   


 


    Dexmedetomidine/0.9% NaCl   96.01





    (Pmx) 400 mcg In Empty   





    Bag 1 bag @ Titrate IV .   





    Q0M [facility] Rx#:[id number]   


 


    Fluconazole in NaCl,Iso-   50





    Osm 100 mg In Saline 1   





    50ml.bag @ 50 mls/hr IVPB   





    DAILY [facility] Rx#:[id number]   


 


    Mvi, Adult No.4 with Vit 30  





    K 10 ml Trace (Conc-1Ml/   





    Dose) 1 ml Sodium Acetate   





    30 meq Potassium   





    Chloride 20 meq Calcium   





    Gluconate 1 gm In Amino   





    Acids 5 %/Dextrose 20 % 1   





    ,000 ml @ 30 mls/hr IV .   





    Q24H [facility] Rx#:[id number]   


 


Output:   


 


  Urine 750 885 775


 


Other:   


 


  Voiding Method Indwelling Catheter Indwelling Catheter Indwelling Catheter














- Exam





PHYSICAL EXAMINATION: 


Patient is lying in the bed comfortably, no acute distress, awake alert. on 

Bipap.. 


HEENT: Normocephalic. Neck is supple. Pupils reactive. Nostrils clear. Oral 

cavity is moist. Ears reveal no drainage. 


Neck reveals no JVD, carotid bruits, or thyromegaly. 


CHEST EXAMINATION: Trachea is central. Symmetrical expansion. Lung fields clear 

to auscultation and percussion. 


CARDIAC: Normal S1, S2 with no gallops. No murmurs 


ABDOMEN: Soft. Bowel sounds normal. No organomegaly. No abdominal bruits. 


Extremities: reveal no edema.  No clubbing or cyanosis


Neurologically awake, alert, oriented x3 with well-coordinated movements.  No 

focal deficits noted


Skin: No rash or skin lesions. 


Psychiatric: Coperative.  


Musculoskeletal: No joint swelling or deformity.  Normal range of motion.





- Labs


CBC & Chem 7: 


                                 04/18/21 03:28





                                 04/18/21 03:24


Labs: 


                  Abnormal Lab Results - Last 24 Hours (Table)











  04/16/21 04/16/21 04/17/21 Range/Units





  17:18 23:54 04:15 


 


WBC     (3.8-10.6)  k/uL


 


RBC     (4.30-5.90)  m/uL


 


Neutrophils #     (1.3-7.7)  k/uL


 


Lymphocytes #     (1.0-4.8)  k/uL


 


D-Dimer    1.51 H  (<0.60)  mg/L FEU


 


Chloride     ()  mmol/L


 


BUN     (9-20)  mg/dL


 


Glucose     (74-99)  mg/dL


 


POC Glucose (mg/dL)  156 H  135 H   (75-99)  mg/dL


 


ALT     (4-49)  U/L


 


Lactate Dehydrogenase     (313-618)  U/L


 


Total Protein     (6.3-8.2)  g/dL


 


Albumin     (3.5-5.0)  g/dL














  04/17/21 04/17/21 04/17/21 Range/Units





  04:15 04:15 05:55 


 


WBC   31.0 H   (3.8-10.6)  k/uL


 


RBC   5.93 H   (4.30-5.90)  m/uL


 


Neutrophils #   29.6 H   (1.3-7.7)  k/uL


 


Lymphocytes #   0.4 L   (1.0-4.8)  k/uL


 


D-Dimer     (<0.60)  mg/L FEU


 


Chloride  111 H    ()  mmol/L


 


BUN  39 H    (9-20)  mg/dL


 


Glucose  125 H    (74-99)  mg/dL


 


POC Glucose (mg/dL)    173 H  (75-99)  mg/dL


 


ALT  123 H    (4-49)  U/L


 


Lactate Dehydrogenase  1533 H    (313-618)  U/L


 


Total Protein  5.5 L    (6.3-8.2)  g/dL


 


Albumin  2.8 L    (3.5-5.0)  g/dL














  04/17/21 Range/Units





  11:50 


 


WBC   (3.8-10.6)  k/uL


 


RBC   (4.30-5.90)  m/uL


 


Neutrophils #   (1.3-7.7)  k/uL


 


Lymphocytes #   (1.0-4.8)  k/uL


 


D-Dimer   (<0.60)  mg/L FEU


 


Chloride   ()  mmol/L


 


BUN   (9-20)  mg/dL


 


Glucose   (74-99)  mg/dL


 


POC Glucose (mg/dL)  194 H  (75-99)  mg/dL


 


ALT   (4-49)  U/L


 


Lactate Dehydrogenase   (313-618)  U/L


 


Total Protein   (6.3-8.2)  g/dL


 


Albumin   (3.5-5.0)  g/dL














Assessment and Plan


Assessment: 





Severe sepsis secondary to Covid pneumonia, beyond window for Remdesevir. 

Continue with dexamethasone and Lovenox subcu daily.  D-dimer is 1.51.





Acute hypoxic respiratory failure secondary to the above, worsening





Gastroesophageal reflux disease





Chronic low back pain





Former nicotine dependence











Plan: Continue on current medication regime ,monitoring and symptomatic 

treatment.ICU management as per pulmonary/intensivist.Covid regimen. Prognosis 

guarded.





Time with Patient: Greater than 30

## 2021-04-19 NOTE — XR
EXAM:

  XR Chest, 1 View

 

CLINICAL HISTORY:

  ITS.REASON XR Reason: Recheck post intubation. Continues to desat

 

TECHNIQUE:

  Frontal view of the chest.

 

COMPARISON:

4/19/2021 at 2:30am

 

FINDINGS:

see impression

 

IMPRESSION:     

1.  Endotracheal tube terminates 6.5 cm above the level of the paul.

 

2.  Unchanged patchy bibasilar opacities, right greater than left 

consistent with multifocal infection.

## 2021-04-19 NOTE — P.PN
Subjective


Progress Note Date: 04/18/21


Principal diagnosis: 





Acute hypoxic respiratory failure secondary to COVID-19 pneumonia





Malcom Griffith is a 49 yo M with PMH of allergies who presented to the ED via EMS

with worsening malaise and shortness of breath. He states he developed a cough 

and sore throat about 2 weeks ago which has worsened since then. He complains of

fever chills and shortness of breath. On arrival he was febrile tachypenic and 

hypoxic SpO2 94% on 4 L O2. WBC 5.8, lymphopenia, , , COVID 

positive, CXR with coarse bilateral infiltrates.





[id number] maintained on 90% airflow, O2 sats in the low 90s.  Positive 

nonproductive cough .  Outside window for Remdesevir, continue on steroids, vi

tamins,lovenox. Chest pain, palpitations, complains of chronic back pain.  T-max

100.2.  Preliminary blood cultures reporting no growth at 24 hours





04/05/2021 status post convalescent plasma, TOCI, maintaining O2 sats in the 90s

on 100% BiPAP.  Chest x-ray reporting stable diffuse bilateral infiltrates.  

Afebrile, T-max 99.2, WBC 14.7.  Hemoglobin 15.7, platelets 321.  BUN 41, 

creatinine 0.94. D-dimer 1.45, ferritin 1511.4, LDH 1025, CRP 54.1.





04/06/2021 alternating between BiPAP and 15 L high flow nasal cannula combined 

with nonrebreather, maintaining O2 sats in the high 80s.  Chest x-ray reporting 

diffuse bilateral infiltrates stable. T-max 99,WBC 14.  Hemoglobin 14.7, 

platelets 328 D-dimer 1.76, ferritin and CRP decreased.  BUN 39, creatinine 

0.91.








04/07/2021 maintained on 15 L-nasal cannula with  nonrebreather mask, IV 

steroids, bronchodilators,maintaining O2 sats in the high 80s.  Did not Require 

BiPAP during the night.  Feels better.  Nonproductive cough.  Complains of mild 

chest tightness with coughing. Chest x-ray reporting stable diffuse bilateral 

infiltrate.  Afebrile, WBC 13.6.  Hemoglobin 14.8, platelets 300. BUN 33, 

creatinine 0.86.  Blood sugars controlled. LDH elevated, 1127, CRP down to 17.6.










04/08/2021  continue on IV steroids, vitamins, maintaining O2 sats in the mid to

high 80s on 15 L nasal cannula plus nonrebreather.  Nonproductive cough.  

Reports she feels better.  Chest x-ray reporting no change in bilateral 

infiltrates.  Afebrile.  Creatinine 0.72.





04/09/2021 Continues on airflow high flow oxygen at 60 L/m and 90% FiO2 along 

with a nonrebreather mask, maintaining O2 sats in the high 80s.   Minimal cough.

 Complains of dry mouth.  He reports, continues to feel better.  Afebrile, WBC 

down to 12.2.  D-dimer, CRP decreased.  LDH increased ,1196.





04/12/2021 maintained on high flow nasal cannula 60 L in addition to 

nonrebreather maintaining better O2 sats  in the high 90s to 100%.  Chest x-ray 

reporting stable bilateral infiltrates .complains of exertional shortness of 

breath. Afebrile, T-max 99.5, WBC 19.  D-dimer, ferritin, LDH  decreased, CRP 

8.1.  BUN 25, creatinine 0.75.  Scheduled to have midline placed today








04/13/2021 rough night, Precedex initiated and patient placed on BiPAP.  This 

morning ,desating down into the 60s, on BiPAP.FiO2 adjusted, with reported O2 

sats improved into the 80s.Chest x-ray reporting stable  patchy bilateral 

Infiltrates.  Doppler reporting negative for DVT of bilateral lower extremities.

 Inflammatory markers elevated.








04/14/2021 difficulty oxygenating yesterday, converted over to BiPAP, currently 

on 100% FiO2 maintaining O2 sats of 63 to low 80s. Unstable to travel for CTA. 

Maintained on Lovenox . Continues on Precedex, covid cocktail, including IV 

Solu-Medrol.Chest x-ray reporting similar to prior exam.  Received Lasix 

yesterday, diuresed well with 24-hour I&O reflecting a negative fluid balance.  

BUN 40, creatinine 0.88.  Afebrile, WBC 20. 








04/15/2021 anxiety significantly improved on Precedex and morphine.  Maintaining

O2 sats in the high 80s on 100% BiPAP pressure 16/12.  Chest x-ray reporting 

some slight improved aeration at the right lung base.  





Therapeutic dosed Lovenox; unstable to travel for CTA to rule out PE .Afebrile, 

WBC 18.4.  Diuresing and remains in a negative fluid balance.  BUN 50, 

creatinine 0.99.








04/16/2021 continues to require BiPAP 100% FiO2, maintaining O2 sats high 80s to

low 90s.  D-dimer 1.77. therapeutic dosed Lovenox decreased. LDH 1371, CRP 6.3. 

24-hour I&O remains in a negative fluid balance. BUN 47, creatinine 0.78.   TPN 

for nutritional support ,Blood sugars controlled.T bili 1.4, ALT 1:15.  

Afebrile, WBC 21.3.





4/17/2021


Patient is currently he is in MICU and on BiPAP.  Awake and alert.  Chest x-ray 

showed persistent bibasilar opacities consistent with COVID-19 infection.  No 

significant interval change from 1 day earlier.


Patient is being continued on IV Solu-Medrol 60 mg every 6 hourly.  Patient is 

being continued on TPN.


Laboratory data showed WBC 31.0 hemoglobin 17.0 platelets 267 and lymphocytes 

0.4 BUN 39 and creatinine 0.68 LDH 1533, CRP 0.5 blood sugar is controlled.





4/18/2021


Patient is currently in the MICU and remains on BiPAP.  Patient decompensated 

with his pulse ox dropped down to 60s and became tachypneic.


Chest x-ray showing persistent bibasilar opacities consistent with COVID-19 

infection.  No significant interval change.


Patient is being continued dexamethasone and Lovenox.  Currently on TPN.


Laboratory data showed WBC trending down to 25.0, hemoglobin 15.7 and platelets 

198 lymphocytes 0.3


 and LDH 1486 and CRP 0.7 pulmonary is on board.





Current medications reviewed..





Objective





- Vital Signs


Vital signs: 


                                   Vital Signs











Temp  97.5 F L  04/18/21 12:00


 


Pulse  67   04/18/21 15:00


 


Resp  16   04/18/21 15:00


 


BP  113/76   04/18/21 15:00


 


Pulse Ox  87 L  04/18/21 15:00








                                 Intake & Output











 04/17/21 04/18/21 04/18/21





 18:59 06:59 18:59


 


Intake Total 406.01 1343 200.804


 


Output Total 950 740 220


 


Balance -543.99 603 -19.196


 


Weight  97.5 kg 


 


Intake:   


 


   220 80


 


    Sodium Chloride 0.9% 1, 260 220 80





    000 ml @ 20 mls/hr IV .   





    Q24H [facility] Rx#:[id number]   


 


  Intake, IV Titration 146.01 1123 120.804





  Amount   


 


    Dexmedetomidine/0.9% NaCl 96.01 100 70.804





    (Pmx) 400 mcg In Empty   





    Bag 1 bag @ Titrate IV .   





    Q0M [facility] Rx#:[id number]   


 


    Fluconazole in NaCl,Iso- 50  50





    Osm 100 mg In Saline 1   





    50ml.bag @ 50 mls/hr IVPB   





    [facility] Rx#:[id number]   


 


    Sodium Acetate 30 meq  1023 





    Potassium Phosphate 9   





    mmol Calcium Gluconate 0.   





    5 gm In Amino Acids 5 %/   





    Dextrose 20 % 1,000 ml @   





    85 mls/hr IV .BY DURATION   





    [facility] Rx#:[id number]   


 


Output:   


 


  Urine 950 740 220


 


Other:   


 


  Voiding Method Indwelling Catheter Indwelling Catheter Indwelling Catheter














- Exam





PHYSICAL EXAMINATION: 


Patient is lying in the bed comfortably, no acute distress, awake alert. on Bipa

p.. 


HEENT: Normocephalic. Neck is supple. Pupils reactive. Nostrils clear. Oral 

cavity is moist. Ears reveal no drainage. 


Neck reveals no JVD, carotid bruits, or thyromegaly. 


CHEST EXAMINATION: Trachea is central. Symmetrical expansion.Bibasilar 

diminished sounds.. 


CARDIAC: Normal S1, S2 with no gallops. No murmurs 


ABDOMEN: Soft. Bowel sounds normal. No organomegaly. No abdominal bruits. 


Extremities: reveal no edema.  No clubbing or cyanosis


Neurologically awake, alert, oriented x3 with well-coordinated movements.  No 

focal deficits noted


Skin: No rash or skin lesions. 


Psychiatric: Coperative.  


Musculoskeletal: No joint swelling or deformity.  Normal range of motion.





- Labs


CBC & Chem 7: 


                                 04/18/21 03:28





                                 04/18/21 03:24


Labs: 


                  Abnormal Lab Results - Last 24 Hours (Table)











  04/17/21 04/17/21 04/18/21 Range/Units





  17:39 23:59 03:24 


 


WBC     (3.8-10.6)  k/uL


 


Neutrophils #     (1.3-7.7)  k/uL


 


Lymphocytes #     (1.0-4.8)  k/uL


 


D-Dimer    1.60 H  (<0.60)  mg/L FEU


 


BUN     (9-20)  mg/dL


 


Glucose     (74-99)  mg/dL


 


POC Glucose (mg/dL)  200 H  222 H   (75-99)  mg/dL


 


ALT     (4-49)  U/L


 


Lactate Dehydrogenase     (313-618)  U/L


 


Total Protein     (6.3-8.2)  g/dL


 


Albumin     (3.5-5.0)  g/dL














  04/18/21 04/18/21 04/18/21 Range/Units





  03:24 03:28 06:31 


 


WBC   25.0 H   (3.8-10.6)  k/uL


 


Neutrophils #   23.9 H   (1.3-7.7)  k/uL


 


Lymphocytes #   0.3 L   (1.0-4.8)  k/uL


 


D-Dimer     (<0.60)  mg/L FEU


 


BUN  31 H    (9-20)  mg/dL


 


Glucose  138 H    (74-99)  mg/dL


 


POC Glucose (mg/dL)    153 H  (75-99)  mg/dL


 


ALT  138 H    (4-49)  U/L


 


Lactate Dehydrogenase  1486 H    (313-618)  U/L


 


Total Protein  5.0 L    (6.3-8.2)  g/dL


 


Albumin  2.6 L    (3.5-5.0)  g/dL














  04/18/21 Range/Units





  11:43 


 


WBC   (3.8-10.6)  k/uL


 


Neutrophils #   (1.3-7.7)  k/uL


 


Lymphocytes #   (1.0-4.8)  k/uL


 


D-Dimer   (<0.60)  mg/L FEU


 


BUN   (9-20)  mg/dL


 


Glucose   (74-99)  mg/dL


 


POC Glucose (mg/dL)  147 H  (75-99)  mg/dL


 


ALT   (4-49)  U/L


 


Lactate Dehydrogenase   (313-618)  U/L


 


Total Protein   (6.3-8.2)  g/dL


 


Albumin   (3.5-5.0)  g/dL














Assessment and Plan


Assessment: 





Severe sepsis secondary to Covid pneumonia, beyond window for Remdesevir. 

Continue with dexamethasone and Lovenox subcu daily.  D-dimer is 1.51.





Acute hypoxic respiratory failure secondary to the above, worsening





Gastroesophageal reflux disease





Chronic low back pain





Former nicotine dependence











Plan: Continue on current medication regime ,monitoring and symptomatic 

treatment.ICU management as per pulmonary/intensivist.Covid regimen. Prognosis 

guarded.





Time with Patient: Greater than 30

## 2021-04-19 NOTE — P.PN
Subjective


Progress Note Date: 04/19/21








CoVID 19 pneumonia





50-year-old male, with history of shortness of breath, who presents to the 

emergency department on March 31, a little after 9:00 PM.  He apparently was 

brought in by EMS.  I saw the patient in the emergency room, in room 33.  He was

on O2 several liters by nasal cannula and not receiving any IV fluids.  He has 

been sick for about 11 or 12 days.  He apparently just tested positive today.  

His symptoms included shortness of breath, weakness, fatigue, low saturations, 

and fever.  As an outpatient, he was on Tylenol, a Z-Alex, Claritin, Zofran, 

Flomax, and a Medrol Dosepak.  He does have ALLERGIES to penicillin antibiotics,

and sulfa antibiotics.  The patient was quite fatigued when I saw him in the 

emergency room.  He could barely open his eyes.  He was not demonstrating any 

signs or symptoms of respiratory compromise, and did not have any conversational

dyspnea or accessory muscle use.  His white count was 4.2, hemoglobin 16, 

hematocrit 46.2, and platelet count 201,000.  PT/INR PTT were normal.  Sodium 

136, potassium 4.4, chlorides 104, CO2 24, anion gap 8, BUN 17, and creatinine 

0.94.  AST was 96, FiO2 104, LDH was 723, and C-reactive protein was 133.9.  

There was no d-dimer ordered.  A chest x-ray did reveal bilateral infiltrates.





The patient is seen today 04/02/2021 in follow-up on the regular medical floor. 

He is currently awake, alert, in no acute distress.  He is still requiring AirVo

high flow nasal cannula at 60 L and 90% FiO2 to maintain O2 saturations in the 

low 90s.  He is currently afebrile.  Hemodynamically stable.  Blood cultures 

reveal no growth.  He remains on Lovenox, Decadron, vitamin supplements. 





The patient is seen today 04/03/2021 in follow-up on the regular medical floor. 

He is currently resting on his right side in bed.  Still quite short of breath 

with minimal exertion.  Still requiring AirVo high flow oxygen at 60 L and 90% 

FiO2 to maintain O2 saturations in the 90s.  Chest x-ray continues to show 

persistent interstitial opacities improving and the left but now worsening on 

the right mid and lower lungs.  D-dimer 0.91.  .  C-reactive protein 2O2.

 He remains on dexamethasone, Lovenox, vitamin supplements.





The patient is seen today 04/04/2021 in follow-up in the intensive care unit.  

He was transferred here last evening after developing increasing shortness of 

breath and increasing oxygen requirements.  He is currently resting fairly 

comfortably in bed.  He remains on BiPAP 14/7 and 100% FiO2 to maintain O2 

saturations in the low 90s.  Chest x-ray continues to show persistent right 

greater than left diffuse interstitial opacities with interval decrease in the 

right lung base and mild increase on the left.  He did receive Tocilizumab and 

convalescent plasma yesterday.  He remains on Lovenox, IV Solu-Medrol, vitamin 

supplements.  White count 10.1.  Hemoglobin 15.5.  Lymphocytes 0.4.  D-dimer 

1.27.  Sodium 140.  Potassium 4.6.  Creatinine 0.82.  LDH 10,025.  C-reactive 

protein 171.





Progress note dated 04/05/2021.





The patient was admitted to the hospital on March 31.  He came to the ICU on 

April 3.  He is currently unfortunately on BiPAP at 14/7, and 100%.  He is 

getting saline at 75 mL an hour.  He is quite short of breath.  The patient did 

receive both convalescent plasma, and TOCI on April 3.  The patient doesn't feel

like he is getting better.  He doesn't feel any worse but just doesn't feel like

he is improving.  He does realize, that in the end, he may end up on the 

mechanical ventilator.  White count 14.7, hemoglobin 15.7, hematocrit 46.0, 

platelet count 321,000.  D-dimer is 1.45, and fibrinogen is 673.  Sodium 143, 

potassium 4.3, chlorides 109, CO2 26, anion gap 8, BUN 41, and creatinine 0.94. 

Most recent LDH is 1025.  The most recent C-reactive protein is 64.1.  Chest x-

ray continues to show bilateral diffuse interstitial infiltrates.





Progress note dated 04/06/2021.





50-year-old male, admitted to the hospital on March 31.  He came in to the ICU 

on April 3.  Currently, the patient's on BiPAP with IPAP of 14, EPAP of 7.  FiO2

is 100%.  When he is not on BiPAP, the patient's on 15 L high flow nasal O2.  In

addition, when he is on the high flow nasal oxygen, he also has a nonrebreather 

mask on as well.  The patient's getting saline at 75 mL an hour.  White count 

14, hemoglobin 14.7, hematocrit 43.7, and platelet count 328,000.  D-dimer 1.76.

 Sodium 142, potassium 4.8, chlorides 112, CO2 26, anion gap 4, BUN and 

creatinine were 39 and 0.91.  C-reactive protein is down to 29.6.  Chest x-ray 

shows diffuse bilateral infiltrates.





The patient is seen today 04/07/2021 in follow-up in the intensive care unit.  

He is currently sitting up in bed.  Awake and alert.  Currently on 15 L high 

flow nasal cannula along with 100% nonrebreather mask.  He did not utilize the 

BiPAP last evening.  He has 0.9 normal saline at 75 ML's per hour.  Still 

dyspneic with minimal exertion.  Chest x-ray continues to show diffuse mixed 

interstitial and alveolar infiltrates.  Stable compared to previous.  Blood 

culture reveals no growth.  White count 13.6.  Hemoglobin 14.8.  Lymphocytes 

0.4.  Sodium 142.  Potassium 4.2.  Creatinine 0.86.  LDH 1127, C-reactive 

protein 17.6.  He remains on bronchodilators, IV Solu-Medrol, Lovenox, vitamin 

supplements.





The patient is seen today 04/08/2021 in follow-up in the intensive care unit.  

He is currently resting fairly comfortably in bed.  No significant events 

overnight.  He is maintaining O2 saturations in the upper 80s and low 90s on 15 

L high flow nasal cannula and addition to a nonrebreather mask.  0.9 normal 

saline at 75 mL per hour.  Chest x-ray shows no changes and bilateral 

infiltrates.  White count 13.3.  Hemoglobin 15.3.  Lymphocytes 0.4.  D-dimer 

3.36.  Sodium 138.  Potassium 4.3.  Creatinine 0.72.  C-reactive protein 14.4.  

He remains on Lovenox, IV Solu-Medrol, vitamin supplements.





The patient is seen today 04/09/2021 follow-up in the intensive care unit.  He 

is currently sitting up in bed.  Awake and alert.  In mild respiratory distress.

 Still quite dyspneic with minimal exertion.  Still with oxygen desaturations 

with minimal exertion.  He is currently on airflow high flow oxygen at 60 L/m 

and 90% FiO2 along with a nonrebreather mask to maintain O2 saturations 85-86%. 

Morning blood gases revealed a PaO2 of 49, pCO2 30 and a pH of 7.48.  0.9 normal

sinus 75 ML's per hour.  He has received a unit of convalescent plasma.  Blood 

cultures reveal no growth.  White count 12.2.  Hemoglobin 15.2.  Lymphocytes 

0.3.  D-dimer 2.84.  Sodium 138.  Potassium 4.3.  Creatinine 0.69.  LDH 1196.  

C-reactive protein 10.4.  Remains on IV Solu-Medrol, rhonchi dilators, Lovenox, 

vitamin supplements.





The patient is seen today 04/10/2021 follow-up in the intensive care unit.  He 

is currently sitting up in bed.  Awake and alert in no acute distress.  Still 

quite dyspneic with minimal exertion in conversation.  He remains on AirVo high 

flow oxygen at 60 L and 95% FiO2 along with a nonrebreather mask with O2 

saturations in the mid 80s.  He did receive convalescent plasma and tocilizumab 

back on 04/03/2021.  He has a 0.9 normal saline at 75 mL per hour.  Yesterday's 

blood gases revealed a pO2 of 49, pCO2 of 30 and a pH of 7.5.  Chest x-ray 

revealing slight improvement of bibasilar infiltrates.  Blood cultures reveal no

growth.  White count 12.7.  Hemoglobin 15.1.  D-dimer 2.47.  Sodium 135.  

Potassium 4.3.  Creatinine 0.74.  LDH 1213.  C-reactive protein 8.6.  Continued 

on Lovenox, IV Solu-Medrol, vitamin supplements.





The patient is seen today 04/11/2021 in follow-up in the intensive care unit.  

He remains sitting up in bed.  Awake and alert.  He is continued on AirVo high 

flow oxygen at 60 L and 95% FiO2 along with a nonrebreather mask.  O2 

saturations in the 80s.  He is 0.9 normal saline at 75 ML's per hour.  He 

received convalescent plasma 1.  Blood cultures reveal no growth.  White count 

11.1.  Hemoglobin 15.3.  Lymphocytes 0.4.  Sodium 134.  Potassium 4.4.  

Creatinine 0.71.  LDH 1172.  C-reactive protein 7.5.  He remains on Lovenox, 

Solu-Medrol, vitamin supplements. 





On 04/12/2021, I'm seeing this patient in follow-up.  The patient was Hospital 

as forCOVID 19  pneumonia and the patient progressive hypoxic respiratory 

failure.  The patient was initially admitted on 04/04/2021 and the patient is 

currently in the intensive care unit requiring high flow oxygen.  He is 

currently on high flow oxygen at 60 L with an FiO2 of 95% in addition to a 

nonrebreather facemask.  He is awake and alert.  He is short of breath with 

limited amount of activity and he easily desaturates.  No significant chest 

pain.  Cough with deep breathing.  The patient has already received convalescent

plasma.  The patient is currently on IV Solu-Medrol 60 mg every 6 hours of this 

in addition to multivitamins. He was also treated with Tocilizumab.  LDH level 

from yesterday was 1157 and the CRP was 8.1.  Chest x-ray still unchanged with 

diffuse bilateral pulmonary infiltrates.  This patient otherwise has been in 

good state of health.  No chronic illnesses otherwise.  He is being monitored 

very closely here in the intensive care unit due to concerns of progressive 

respiratory failure requiring intubation mechanical ventilation.  Blood cultures

of been negative thus far.





On 04/13/2021, the patient is being seen for a follow-up.  This is a case of 

Coumadin related pneumonia with hypoxic respiratory failure.  The patient was on

high flow oxygen yesterday and he was on 60 L with an FiO2 of 95%.  He was also 

using the nonrebreather facemask in addition to the high flow oxygen.  Was doing

some activity and movement yesterday, the patient desaturated down to the 60s 

and he was hard to recovered.  At this time, the patient became quite restless 

and agitated.  He was placed on a BiPAP briefly which made him more panicky 

uncomfortable.  Overnight, he was also started on Precedex which is currently ru

nning at 0.2 mcg/kg per minute.  Ultimately, he was taken off the BiPAP and the 

patient is currently back on high flow oxygen at 60 L with a 93% in addition to 

100% nonrebreather facemask.  His current pulse ox is order of 76-82 percent.  

The chest x-ray from today is showing bilateral lower lobe pulmonary infiltrates

and compared to yesterday's chest x-ray, there is no major interval change.  Fin

dings and essentially stable.  Meanwhile, his blood work from today is showing a

white cell count of 15.8, he does have lymphopenia, renal function is stable, 

LDH level is 1-15 and the CRP is 7.4.  I also check a pro-calcitonin level on 

him yesterday and the level came back low at 0.08.  Meanwhile, the patient is 

currently on Solu-Medrol 60 mg every 6 hours he is also on Lovenox 40 mg subcu 

every 24 hours.  The patient remains on Precedex for now.  His calm and 

comfortable and responsive.





04/14/2021, the patient is having difficulty with hypoxemia and worsening 

shortness of breath.  Note that after being on high flow oxygen for quite some 

time, the patient started showing some signs of decompensated yesterday.  His 

pulse ox was dropping in the mid and low 80s and he was very slow in recovering.

 Based on that, he was switched to a BiPAP which is currently running at a BiPAP

pressure of 14/5 cm of water with an FiO2 of 100%.  Despite all this, he remains

tachypneic.  The patient prior volume is above 1 L and his respiratory rate is 

in the mid 40s and is generating a very high risk ventilation even without the 

BiPAP.  He is quite tachypneic.  Pulse ox currently is in the low 70s.  He is on

Precedex.  He is very anxious.  His panicky.  He is restless. Precedex is 

running at micrograms per kilogram per minute.  As far as his treatment, the 

patient remains on IV Solu Medrol 60 mg every 6 hours.  He is also on Lovenox 40

mg subcu every 24 hours.  Chest x-ray from today is showing stable bilateral 

pulmonary infiltrates, essentially unchanged compared to yesterday.  Doppler of 

the lower extremities was done yesterday showed no evidence of DVT.  His 

inflammatory markers today is showing a d-dimer of 4.1 from yesterday.  LDH is 

01/04/2009, higher, CRP is 5.7, able.  Rest of the electrodes are stable, BUN is

40 with a creatinine of 0.8.  His white cell count is currently at 20 and 

hemoglobin is at 16.  His net fluid balance has been -2.6 L and the patient was 

given Lasix yesterday and the patient was Negative Fluid Balance.





04/15/2021 the patient is being seen for a follow-up.  He obviously not 

intubated yesterday as the patient calmed down considerably with utilization of 

Precedex and morphine.  Nevertheless, his sister status remains borderline and 

currently is still on a BiPAP at a pressure of 16/12 and FiO2 of 100% and the 

patient is also on Precedex and points Precedex at 0.7 mcg/kg per minute and 

morphine as needed.  He is not was essentially uneventful.  His current pulse ox

is around 89%.  The easily desaturates.  He had several events yesterday with 

his pulse ox dropped to lower levels and is taking them quite some time until he

recovers probably in the order of 30 minutes to 45 minutes.  During this time 

the patient becomes quite panicky restless and short of breath.  We have still 

avoided intubation on this patient and were continuing the supportive care with 

noninvasive positive pressure ventilation.  The chest x-ray remains stable 

without any interval change and there is some lower lobe at the pulmonary 

infiltrates.  The patient's white cell count is 18.4 with a hemoglobin of 16.8. 

His electrolytes are normal, renal function is showing a stable creatinine of 

0.9, rest of the inflammatory markers are still pending for now.  Fluid balance 

has been in the order of -2.6 L for yesterday and the patient is headed towards 

more negative fluid balance for today.  The patient remains on IV Solu-Medrol 60

mg every 6 hours.  He is on Lovenox therapeutic dose of 100 mg by mouth every 12

hours therapeutic dose as well as utilized as the patient was getting 

progressively more hypoxic and pulmonary embolism was a constellation.  The 

patient was unable to undergo a CT angiogram because of his very limited 

pulmonary reserve and borderline respiratory status.  Doppler of the lower 

extremities were obviously negative.  No signs of bleeding and was going to 

continue the therapy goes of Lovenox for another 24 hours.  D-dimer from today 

is pending for now.  Clinically, he is resting comfortably in bed.  His night 

was otherwise uneventful.  A Devlin catheter was also inserted yesterday is 

limited his mobility.





04/16/2021, the patient remains on a BiPAP of 16/12 cm of water with an FiO2 of 

100%.  His current respiratory rate is in the low 30s.  He seems to be 

comfortable, he had very much dependent on a BiPAP and the patient has limited r

eserve and easily desaturates.  His been on BiPAP for the past 3 days for now.  

This is making his nose irritated in the mouth dry.  The chest x-ray from today 

still pending.  Yesterday's chest x-ray was reviewed and the findings were 

essentially stable.  To control his increased level of anxiety and synchrony 

with the noninvasive positive pressure ventilator, and without Precedex which is

currently running at 0.7 mcg/kg per minute and this has done significant 

improvement in terms of lowering his respiratory rate and taking control of his 

anxiety.  He is on TPN for nutritional support for now.  He has a PICC line in 

his left upper extremity.  In terms of therapy, he remains on Lovenox 1 mg every

every 12 hours.  He remains on IV Solu Medrol 60 mg IV every 6 hours.  

Inflammatory markers on today's evaluation showed a d-dimer of 1.77, his LDH 

level is 1371 and his CRP level is at 6.3.  His electrolytes are all within 

normal limits.  His fluid balance over the past 24 hours is negative for 54 mL 

and the patient was being diuresed with Lasix.  He does not have a lazy 

extending dose for now.  He was also given Diflucan regarding the possibility of

some oropharyngeal candidiasis.  IV fluids currently running at 20 mL an hour.  

TPN is currently running at 30 mL an hour and the patient currently has a Devlin 

catheter in place.





04/17/2021, the patient is still on BiPAP.  He was on BiPAP overnight and is 

getting quite anxious and tired of being on the BiPAP.  Current BiPAP settings 

of 16/12 an FiO2 of 100%.  Current pulse ox is ranging between 84 and 87%.  He 

wants to try to high flow oxygen system again.  His white cell count is at 31.  

His d-dimer is at 1.5.  His LDH level is 1533 and his CRP level is at 0.5.  His 

chest x-ray from today is showing infiltrates in lung bases bilaterally, 

essentially unchanged compared to the yesterday's chest film.  I was told by the

nursing staff that even while receiving oral care.  He is still on Precedex at 

0.7 mcg/kg per minute.  He remains on IV Solu Medrol 60 mg every 6 hours.  I 

will today's condition essentially unchanged..  Have some oropharyngeal 

candidiasis for which she was started on Diflucan.  He remains on TPN for 

nutritional support which is running at 30 mL an hour.  He is awake and alert 

and is, indicating.  Altered mentation.  Found the week.  Quite discouraged 

because of his ongoing respiratory failure.  Unable to use a incentive 

spirometer as the patient is currently strictly on BiPAP.  He is not 

tachycardic.  His current respiratory rate is in the mid 20s.  He is able to 

generate higher tidal volumes and his minute ventilation continues to be quite 

elevated at 30





04/18/2021, the patient remains unchanged and this is quite discouraging as the 

patient is not doing any significant progress.  He remains very much BiPAP 

dependent.  Yesterday with ecchymosis the BiPAP for 5 minutes and he 

decompensated with his pulse ox dropped down to the low 60s and he became 

extremely tachypneic.  Even now, with movement and limited activity, the patient

desaturates and is taken in quite some time to recover.  His chest x-ray still 

showing bilateral lower lobe pulmonary infiltrates consistent with COVID-19 

related pneumonia/ARDS.  The patient remains on BiPAP this morning at a pressure

of 16/12 with an FiO2 of 100%.  LDH level remains elevated at 1486 and the CRP 

level is down to 0.7.  The d-dimer currently is at 1.6.  It is of treatment, the

patient is on IV Solu Medrol 60 mg every 6 hours.  He remains on Precedex at 0 

point have and microvascular kilogram per minute.  Precedex has made a much more

comfortable and less agitated.  He is awake.  Is arousable.  He cannot eat.  He 

is on TPN for nutritional support.  He was started on Diflucan for some 

oropharyngeal candidiasis.  TPN is running at the rate of 85 mL an hour.  In 

terms of his blood work and labs, white cell count was 25 and a platelet count 

was 198 slightly lower, his electrolytes are all within normal limits.  Total 

protein is down to 5 with albumin level of 2.6.  His triglyceride level is at 

379.  He is weak.  He is in bed most of the time.  Unable to stop on a chair.  

As mentioned, he carries a high risk of failing respiratory status requiring 

intubation mechanical ventilation.





04/19/2021, the patient is being seen in the intensive care unit.  This is a 

case of COVID-19 related pneumonia and ARDS.  At around midnight and on, the 

patient progressively become more hypoxic and his pulse ox dropped in the low 

70s.  At that point, the decision was to proceed with intubation mechanical 

ventilation.  Note that the patient was on BiPAP for several days and he was 

still hanging on with a pulse ox in the mid 80s and low 90s.  Nevertheless, 

overnight, he decompensated and he became more agitated tachycardic restless and

hypotensive.  As such, the decision was to proceed with intubation mechanical 

ventilation.  The patient is currently intubated.  Post intubation, we had 

significant difficulties with respiratory acidosis, hypotension, a synchrony 

with the mechanical ventilator and ongoing hypoxemia.  As such, the patient was 

immediately sedated and paralyzed.  Currently propofol is running at 75 mcg/kg 

per minute and fentanyl is running at 0.5 mcg/kg per minute.  Patient is also 

paralyzed with Nimbex at 2 mcg/kg per minute.  The patient is currently 

mechanically ventilated.  I did a lot of vent changes throughout the night and 

the most current vent setting includes an assist-control of 30 with an FiO2 of 

100% and a PEEP of 20 with a tidal volume of 350.  The patient's peak airway 

patient is 40.  Static pressure is 39.  The patient's chest x-ray showing 

extensive consolidation of the lower lobes more so on the right and as such I 

suspect a superimposed infection the right lower lobe.  Hemodynamically, the 

patient received IV fluids and he received a total of 2 L and currently IV 

fluids running at 0.9 at 150 mL an hour.  I made recommendations to stop the TPN

for now.  Meanwhile, the patient will be also covered with broad-spectrum 

antibiotics with a combination of cefepime, vancomycin and Eraxis.  Diflucan was

be discontinued.  Cultures will be sent.  The patient is oliguric and his skin 

is getting mottled and he is becoming more cyanotic.  In terms of labs, the most

recent blood gases available prior to the vent setting change includes a pH of 

7.06 with a pCO2 of 100 and pO2 of 58.  His blood work from this morning shows a

BUN of 32 with a creatinine of 0.7.  His white cell count is up to 55.3 with a 

hemoglobin of 16.7.  His LDH is up to 2094 and the CRP is at 3.6.  LFTs are also

on the rise.  Rest of the electrolytes are all within normal limits.  The 

patient is currently sedated and paralyzed, he is hemodynamically unstable in 

follow-up.   He is also on pressors and norepinephrine infusion is running at 

0.1 mcg/kg per minute.  Follow-up blood gases are pending for now.  





Objective





- Vital Signs


Vital signs: 


                                   Vital Signs











Temp  98.0 F   04/19/21 04:00


 


Pulse  154 H  04/19/21 06:00


 


Resp  30 H  04/19/21 06:00


 


BP  145/92   04/19/21 02:00


 


Pulse Ox  74 L  04/19/21 06:00








                                 Intake & Output











 04/18/21 04/19/21 04/19/21





 18:59 06:59 18:59


 


Intake Total 1680.890 2923.158 


 


Output Total 760 780 


 


Balance 654.804 672.158 


 


Weight  98.8 kg 


 


Intake:   


 


   1167 


 


    Sodium Acetate 30 meq  935 





    Potassium Phosphate 9   





    mmol Calcium Gluconate 0.   





    5 gm In Amino Acids 5 %/   





    Dextrose 20 % 1,000 ml @   





    85 mls/hr IV .BY DURATION   





    SHAZIA Rx#:[id number]   


 


    Sodium Chloride 0.9% 1, 260 220 





    000 ml @ 20 mls/hr IV .   





    Q24H [facility]AZIA Rx#:[id number]   


 


    pressure bag  12 


 


  Intake, IV Titration 1154.804 285.158 





  Amount   


 


    Cisatracurium 200 mg In  16.104 





    Sodium Chloride 0.9% 180   





    ml @ 1 MCG/KG/MIN 5.928   





    mls/hr IV .Q24H [facility]AZ[facility] Rx#:   





    [id number]   


 


    Dexmedetomidine/0.9% NaCl 70.804 164.573 





    (Pmx) 400 mcg In Empty   





    Bag 1 bag @ Titrate IV .   





    Q0M [facility] Rx#:[id number]   


 


    Fluconazole in NaCl,Iso- 50  





    Osm 100 mg In Saline 1   





    50ml.bag @ 50 mls/hr IVPB   





    DAILY SHAZIA Rx#:[id number]   


 


    Mvi, Adult No.4 with Vit 1034  





    K 10 ml Trace (Conc-1Ml/   





    Dose) 1 ml Sodium Acetate   





    30 meq Potassium   





    Phosphate 9 mmol Calcium   





    Gluconate 0.5 gm In Amino   





    Acids 5 %/Dextrose 20 %   





    1,000 ml @ 85 mls/hr IV .   





    BY DURATION [facility] Rx#:   





    [id number]   


 


    propofoL 1,000 mg In  104.481 





    Empty Bag 1 bag @ Titrate   





    IV .Q0M [facility] Rx#:   





    [id number]   


 


Output:   


 


  Urine 760 780 


 


Other:   


 


  Voiding Method Indwelling Catheter  








                       ABP, PAP, CO, CI - Last Documented











Arterial Blood Pressure        93/61

















- Exam








GENERAL EXAM: Sedated, paralyzed, intubated on mechanical ventilator.  He has a 

orogastric and orotracheal tube in place.


HEAD: Normocephalic.


EYES: Normal reaction of pupils, equal size.


NOSE: Clear with pink turbinates.


THROAT: No erythema or exudates.


NECK: No masses, no JVD.


CHEST: No chest wall deformity.


LUNGS: Equal air entry with basilar crackles.  Very much tachypneic and the 

patient is using excessive muscle breathing


CVS: S1 and S2 normal with no audible murmur, regular rhythm.


ABDOMEN: No hepatosplenomegaly, normal bowel sounds, no guarding or rigidity.


SPINE: No scoliosis or deformity


SKIN: No rashes


CENTRAL NERVOUS SYSTEM:  Sedated and paralyzed


EXTREMITIES: There is no peripheral edema.  No clubbing, no cyanosis ,  This 

skin is becoming more monitored in the lower extremities.  Pulses are 

diminished.





- Labs


CBC & Chem 7: 


                                 04/19/21 04:15





                                 04/19/21 04:15


Labs: 


                  Abnormal Lab Results - Last 24 Hours (Table)











  04/18/21 04/19/21 04/19/21 Range/Units





  11:43 00:09 01:47 


 


WBC     (3.8-10.6)  k/uL


 


Neutrophils #     (1.3-7.7)  k/uL


 


Lymphocytes #     (1.0-4.8)  k/uL


 


Monocytes #     (0-1.0)  k/uL


 


D-Dimer     (<0.60)  mg/L FEU


 


ABG pH    7.33 L  (7.35-7.45)  


 


ABG pCO2     (35-45)  mmHg


 


ABG pO2    27 L*  ()  mmHg


 


ABG HCO3     (21-25)  mmol/L


 


ABG Total CO2    25 H  (19-24)  mmol/L


 


ABG O2 Saturation    45.5 L  (94-97)  %


 


BUN     (9-20)  mg/dL


 


Glucose     (74-99)  mg/dL


 


POC Glucose (mg/dL)  147 H  150 H   (75-99)  mg/dL


 


Calcium     (8.4-10.2)  mg/dL


 


Phosphorus     (2.5-4.5)  mg/dL


 


Total Bilirubin     (0.2-1.3)  mg/dL


 


AST     (17-59)  U/L


 


ALT     (4-49)  U/L


 


Alkaline Phosphatase     ()  U/L


 


Lactate Dehydrogenase     (313-618)  U/L


 


C-Reactive Protein     (<1.0)  mg/dL


 


Total Protein     (6.3-8.2)  g/dL


 


Albumin     (3.5-5.0)  g/dL














  04/19/21 04/19/21 04/19/21 Range/Units





  03:09 04:15 04:15 


 


WBC    55.3 H*  (3.8-10.6)  k/uL


 


Neutrophils #    53.1 H  (1.3-7.7)  k/uL


 


Lymphocytes #    0.6 L  (1.0-4.8)  k/uL


 


Monocytes #    1.3 H  (0-1.0)  k/uL


 


D-Dimer     (<0.60)  mg/L FEU


 


ABG pH  7.10 L*    (7.35-7.45)  


 


ABG pCO2  88 H*    (35-45)  mmHg


 


ABG pO2  64 L    ()  mmHg


 


ABG HCO3  27 H    (21-25)  mmol/L


 


ABG Total CO2  30 H    (19-24)  mmol/L


 


ABG O2 Saturation  82.3 L    (94-97)  %


 


BUN   32 H   (9-20)  mg/dL


 


Glucose   191 H   (74-99)  mg/dL


 


POC Glucose (mg/dL)     (75-99)  mg/dL


 


Calcium   8.3 L   (8.4-10.2)  mg/dL


 


Phosphorus   4.7 H   (2.5-4.5)  mg/dL


 


Total Bilirubin   1.7 H   (0.2-1.3)  mg/dL


 


AST   90 H   (17-59)  U/L


 


ALT   219 H   (4-49)  U/L


 


Alkaline Phosphatase   160 H   ()  U/L


 


Lactate Dehydrogenase   2094 H   (313-618)  U/L


 


C-Reactive Protein   3.6 H   (<1.0)  mg/dL


 


Total Protein   5.5 L   (6.3-8.2)  g/dL


 


Albumin   2.8 L   (3.5-5.0)  g/dL














  04/19/21 04/19/21 Range/Units





  04:15 04:22 


 


WBC    (3.8-10.6)  k/uL


 


Neutrophils #    (1.3-7.7)  k/uL


 


Lymphocytes #    (1.0-4.8)  k/uL


 


Monocytes #    (0-1.0)  k/uL


 


D-Dimer  12.01 H   (<0.60)  mg/L FEU


 


ABG pH   7.06 L*  (7.35-7.45)  


 


ABG pCO2   100 H*  (35-45)  mmHg


 


ABG pO2   58 L*  ()  mmHg


 


ABG HCO3   29 H  (21-25)  mmol/L


 


ABG Total CO2   32 H  (19-24)  mmol/L


 


ABG O2 Saturation   76.6 L  (94-97)  %


 


BUN    (9-20)  mg/dL


 


Glucose    (74-99)  mg/dL


 


POC Glucose (mg/dL)    (75-99)  mg/dL


 


Calcium    (8.4-10.2)  mg/dL


 


Phosphorus    (2.5-4.5)  mg/dL


 


Total Bilirubin    (0.2-1.3)  mg/dL


 


AST    (17-59)  U/L


 


ALT    (4-49)  U/L


 


Alkaline Phosphatase    ()  U/L


 


Lactate Dehydrogenase    (313-618)  U/L


 


C-Reactive Protein    (<1.0)  mg/dL


 


Total Protein    (6.3-8.2)  g/dL


 


Albumin    (3.5-5.0)  g/dL














Assessment and Plan


Plan: 








1 ARDS with secondary Acute hypoxic respiratory failure secondary to CoVID 19 

pneumonia.  Outside the window for Remdesivir.  Did receive tocilizumab and 1 

unit of convalescent plasma.   Currently the patient is on Decadron 6 mg IV 

every 24 hours.  In summary, the patient was on BiPAP for almost a week and he 

was relatively stable and his condition decompensated early this morning.  The 

patient to be intubated and placed on a mechanical ventilator.  Currently he is 

sedated, paralyzed, and the patient is currently in a shock state, hypotensive, 

acidotic and this is mainly respiratory acidosis and hypoxic currently being 

ventilated with a low tidal volume high PEEP regimen.  The patient has also 

developed worsening in the right lower lobe pulmonary infiltrates.  Consider the

possibility of a superimposed infection/sepsis/hospital-acquired pneumonia.  

White cell count is on the rise and the patient is also significantly 

hypotensive currently on pressors.  As such a superimposed right lower lobe p

neumonia is suspected on top of his ARDS/hypoxic respiratory failure.  Peak and 

static pressures are quite elevated and the patient is hypoxic with a very low 

PF ratio.  





2 shock, with significant drop in the blood pressure and the patient is oliguric

currently on pressors.  2 L of IV fluid was given and the patient is currently 

on pressors.





3 elevated inflammatory markers secondary to Covid 19 infection





4 leukocytosis with ongoing rise in the white cell count up to 55





5  oropharyngeal candidiasis





6.  TPN for nutritional support





7 debility and muscle weakness secondary to above-mentioned comorbidities











Plan: 


 


Keep the patient sedated and paralyzed


Bedside ventilator changes were done and the patient is currently on assist 

control of 36 with a tidal volume of 350 with an FiO2 of 100% and a PEEP of 20. 

Follow-up blood gases are pending.  Meanwhile, the patient be given 2 A of so

dium bicarb to improve some of his acidosis.


We'll give an additional liter of fluids IV normal saline and this will come up 

to a total of 3 L and the patient will be maintained on maintenance 150 mL an 

hour


Titrate norepinephrine infusion to maintain his mean arterial pressure above 60


Keep the patient sedated and paralyzed


Continue IV Decadron


Stop Diflucan and add a combination of cefepime, vancomycin and Eraxis


Obtain blood cultures


Obtain pro-calcitonin level


Stop TPN for now


Condition is extremely critical.  Significant changes condition over the past 12

hours.  Obviously the patient is decompensated and currently is in respiratory 

failure and shock state.  We'll continue to follow.  Prognosis poor.  Family 

will be informed and further changes will be done based on the blood gas and 

ongoing progress.


Condition is critical.  





Critical care time >30 minutes.


Time with Patient: Greater than 30

## 2021-04-19 NOTE — P.PN
Subjective


Progress Note Date: 04/19/21


Principal diagnosis: 





Acute hypoxic respiratory failure secondary to COVID-19 pneumonia and ARDS





50-year-old male, with history of shortness of breath, who presents to the 

emergency department on March 31, a little after 9:00 PM.  He apparently was 

brought in by EMS.  I saw the patient in the emergency room, in room 33.  He was

on O2 several liters by nasal cannula and not receiving any IV fluids.  He has 

been sick for about 11 or 12 days.  He apparently just tested positive today.  

His symptoms included shortness of breath, weakness, fatigue, low saturations, 

and fever.  As an outpatient, he was on Tylenol, a Z-Alex, Claritin, Zofran, 

Flomax, and a Medrol Dosepak.  He does have ALLERGIES to penicillin antibiotics,

and sulfa antibiotics.  The patient was quite fatigued when I saw him in the 

emergency room.  He could barely open his eyes.  He was not demonstrating any s

igns or symptoms of respiratory compromise, and did not have any conversational 

dyspnea or accessory muscle use.  His white count was 4.2, hemoglobin 16, 

hematocrit 46.2, and platelet count 201,000.  PT/INR PTT were normal.  Sodium 

136, potassium 4.4, chlorides 104, CO2 24, anion gap 8, BUN 17, and creatinine 

0.94.  AST was 96, FiO2 104, LDH was 723, and C-reactive protein was 133.9.  

There was no d-dimer ordered.  A chest x-ray did reveal bilateral infiltrates.





The patient is seen today 04/02/2021 in follow-up on the regular medical floor. 

He is currently awake, alert, in no acute distress.  He is still requiring AirVo

high flow nasal cannula at 60 L and 90% FiO2 to maintain O2 saturations in the 

low 90s.  He is currently afebrile.  Hemodynamically stable.  Blood cultures 

reveal no growth.  He remains on Lovenox, Decadron, vitamin supplements. 





The patient is seen today 04/03/2021 in follow-up on the regular medical floor. 

He is currently resting on his right side in bed.  Still quite short of breath 

with minimal exertion.  Still requiring AirVo high flow oxygen at 60 L and 90% 

FiO2 to maintain O2 saturations in the 90s.  Chest x-ray continues to show 

persistent interstitial opacities improving and the left but now worsening on 

the right mid and lower lungs.  D-dimer 0.91.  .  C-reactive protein 2O2.

 He remains on dexamethasone, Lovenox, vitamin supplements.





The patient is seen today 04/04/2021 in follow-up in the intensive care unit.  

He was transferred here last evening after developing increasing shortness of 

breath and increasing oxygen requirements.  He is currently resting fairly 

comfortably in bed.  He remains on BiPAP 14/7 and 100% FiO2 to maintain O2 

saturations in the low 90s.  Chest x-ray continues to show persistent right 

greater than left diffuse interstitial opacities with interval decrease in the 

right lung base and mild increase on the left.  He did receive Tocilizumab and 

convalescent plasma yesterday.  He remains on Lovenox, IV Solu-Medrol, vitamin 

supplements.  White count 10.1.  Hemoglobin 15.5.  Lymphocytes 0.4.  D-dimer 

1.27.  Sodium 140.  Potassium 4.6.  Creatinine 0.82.  LDH 10,025.  C-reactive 

protein 171.





Progress note dated 04/05/2021.





The patient was admitted to the hospital on March 31.  He came to the ICU on 

April 3.  He is currently unfortunately on BiPAP at 14/7, and 100%.  He is 

getting saline at 75 mL an hour.  He is quite short of breath.  The patient did 

receive both convalescent plasma, and TOCI on April 3.  The patient doesn't feel

like he is getting better.  He doesn't feel any worse but just doesn't feel like

he is improving.  He does realize, that in the end, he may end up on the 

mechanical ventilator.  White count 14.7, hemoglobin 15.7, hematocrit 46.0, 

platelet count 321,000.  D-dimer is 1.45, and fibrinogen is 673.  Sodium 143, 

potassium 4.3, chlorides 109, CO2 26, anion gap 8, BUN 41, and creatinine 0.94. 

Most recent LDH is 1025.  The most recent C-reactive protein is 64.1.  Chest 

x-ray continues to show bilateral diffuse interstitial infiltrates.





Progress note dated 04/06/2021.





50-year-old male, admitted to the hospital on March 31.  He came in to the ICU 

on April 3.  Currently, the patient's on BiPAP with IPAP of 14, EPAP of 7.  FiO2

is 100%.  When he is not on BiPAP, the patient's on 15 L high flow nasal O2.  In

addition, when he is on the high flow nasal oxygen, he also has a nonrebreather 

mask on as well.  The patient's getting saline at 75 mL an hour.  White count 

14, hemoglobin 14.7, hematocrit 43.7, and platelet count 328,000.  D-dimer 1.76.

 Sodium 142, potassium 4.8, chlorides 112, CO2 26, anion gap 4, BUN and 

creatinine were 39 and 0.91.  C-reactive protein is down to 29.6.  Chest x-ray 

shows diffuse bilateral infiltrates.





The patient is seen today 04/07/2021 in follow-up in the intensive care unit.  

He is currently sitting up in bed.  Awake and alert.  Currently on 15 L high 

flow nasal cannula along with 100% nonrebreather mask.  He did not utilize the 

BiPAP last evening.  He has 0.9 normal saline at 75 ML's per hour.  Still 

dyspneic with minimal exertion.  Chest x-ray continues to show diffuse mixed 

interstitial and alveolar infiltrates.  Stable compared to previous.  Blood 

culture reveals no growth.  White count 13.6.  Hemoglobin 14.8.  Lymphocytes 

0.4.  Sodium 142.  Potassium 4.2.  Creatinine 0.86.  LDH 1127, C-reactive 

protein 17.6.  He remains on bronchodilators, IV Solu-Medrol, Lovenox, vitamin 

supplements.





The patient is seen today 04/08/2021 in follow-up in the intensive care unit.  

He is currently resting fairly comfortably in bed.  No significant events 

overnight.  He is maintaining O2 saturations in the upper 80s and low 90s on 15 

L high flow nasal cannula and addition to a nonrebreather mask.  0.9 normal 

saline at 75 mL per hour.  Chest x-ray shows no changes and bilateral 

infiltrates.  White count 13.3.  Hemoglobin 15.3.  Lymphocytes 0.4.  D-dimer 

3.36.  Sodium 138.  Potassium 4.3.  Creatinine 0.72.  C-reactive protein 14.4.  

He remains on Lovenox, IV Solu-Medrol, vitamin supplements.





The patient is seen today 04/09/2021 follow-up in the intensive care unit.  He 

is currently sitting up in bed.  Awake and alert.  In mild respiratory distress.

 Still quite dyspneic with minimal exertion.  Still with oxygen desaturations 

with minimal exertion.  He is currently on airflow high flow oxygen at 60 L/m 

and 90% FiO2 along with a nonrebreather mask to maintain O2 saturations 85-86%. 

Morning blood gases revealed a PaO2 of 49, pCO2 30 and a pH of 7.48.  0.9 normal

sinus 75 ML's per hour.  He has received a unit of convalescent plasma.  Blood 

cultures reveal no growth.  White count 12.2.  Hemoglobin 15.2.  Lymphocytes 

0.3.  D-dimer 2.84.  Sodium 138.  Potassium 4.3.  Creatinine 0.69.  LDH 1196.  

C-reactive protein 10.4.  Remains on IV Solu-Medrol, rhonchi dilators, Lovenox, 

vitamin supplements.





The patient is seen today 04/10/2021 follow-up in the intensive care unit.  He 

is currently sitting up in bed.  Awake and alert in no acute distress.  Still 

quite dyspneic with minimal exertion in conversation.  He remains on AirVo high 

flow oxygen at 60 L and 95% FiO2 along with a nonrebreather mask with O2 satura

tions in the mid 80s.  He did receive convalescent plasma and tocilizumab back 

on 04/03/2021.  He has a 0.9 normal saline at 75 mL per hour.  Yesterday's blood

gases revealed a pO2 of 49, pCO2 of 30 and a pH of 7.5.  Chest x-ray revealing 

slight improvement of bibasilar infiltrates.  Blood cultures reveal no growth.  

White count 12.7.  Hemoglobin 15.1.  D-dimer 2.47.  Sodium 135.  Potassium 4.3. 

Creatinine 0.74.  LDH 1213.  C-reactive protein 8.6.  Continued on Lovenox, IV 

Solu-Medrol, vitamin supplements.





The patient is seen today 04/11/2021 in follow-up in the intensive care unit.  

He remains sitting up in bed.  Awake and alert.  He is continued on AirVo high 

flow oxygen at 60 L and 95% FiO2 along with a nonrebreather mask.  O2 

saturations in the 80s.  He is 0.9 normal saline at 75 ML's per hour.  He 

received convalescent plasma 1.  Blood cultures reveal no growth.  White count 

11.1.  Hemoglobin 15.3.  Lymphocytes 0.4.  Sodium 134.  Potassium 4.4.  

Creatinine 0.71.  LDH 1172.  C-reactive protein 7.5.  He remains on Lovenox, 

Solu-Medrol, vitamin supplements. 





On 04/12/2021, I'm seeing this patient in follow-up.  The patient was Hospital 

as forCOVID 19  pneumonia and the patient progressive hypoxic respiratory 

failure.  The patient was initially admitted on 04/04/2021 and the patient is 

currently in the intensive care unit requiring high flow oxygen.  He is 

currently on high flow oxygen at 60 L with an FiO2 of 95% in addition to a 

nonrebreather facemask.  He is awake and alert.  He is short of breath with 

limited amount of activity and he easily desaturates.  No significant chest 

pain.  Cough with deep breathing.  The patient has already received convalescent

plasma.  The patient is currently on IV Solu-Medrol 60 mg every 6 hours of this 

in addition to multivitamins. He was also treated with Tocilizumab.  LDH level 

from yesterday was 1157 and the CRP was 8.1.  Chest x-ray still unchanged with 

diffuse bilateral pulmonary infiltrates.  This patient otherwise has been in 

good state of health.  No chronic illnesses otherwise.  He is being monitored 

very closely here in the intensive care unit due to concerns of progressive 

respiratory failure requiring intubation mechanical ventilation.  Blood cultures

of been negative thus far.





On 04/13/2021, the patient is being seen for a follow-up.  This is a case of 

Coumadin related pneumonia with hypoxic respiratory failure.  The patient was on

high flow oxygen yesterday and he was on 60 L with an FiO2 of 95%.  He was also 

using the nonrebreather facemask in addition to the high flow oxygen.  Was doing

some activity and movement yesterday, the patient desaturated down to the 60s 

and he was hard to recovered.  At this time, the patient became quite restless 

and agitated.  He was placed on a BiPAP briefly which made him more panicky 

uncomfortable.  Overnight, he was also started on Precedex which is currently 

running at 0.2 mcg/kg per minute.  Ultimately, he was taken off the BiPAP and 

the patient is currently back on high flow oxygen at 60 L with a 93% in addition

to 100% nonrebreather facemask.  His current pulse ox is order of 76-82 percent.

 The chest x-ray from today is showing bilateral lower lobe pulmonary 

infiltrates and compared to yesterday's chest x-ray, there is no major interval 

change.  Findings and essentially stable.  Meanwhile, his blood work from today 

is showing a white cell count of 15.8, he does have lymphopenia, renal function 

is stable, LDH level is 1-15 and the CRP is 7.4.  I also check a pro-calcitonin 

level on him yesterday and the level came back low at 0.08.  Meanwhile, the 

patient is currently on Solu-Medrol 60 mg every 6 hours he is also on Lovenox 40

mg subcu every 24 hours.  The patient remains on Precedex for now.  His calm and

comfortable and responsive.





04/14/2021, the patient is having difficulty with hypoxemia and worsening 

shortness of breath.  Note that after being on high flow oxygen for quite some 

time, the patient started showing some signs of decompensated yesterday.  His 

pulse ox was dropping in the mid and low 80s and he was very slow in recovering.

 Based on that, he was switched to a BiPAP which is currently running at a BiPAP

pressure of 14/5 cm of water with an FiO2 of 100%.  Despite all this, he remains

tachypneic.  The patient prior volume is above 1 L and his respiratory rate is 

in the mid 40s and is generating a very high risk ventilation even without the 

BiPAP.  He is quite tachypneic.  Pulse ox currently is in the low 70s.  He is on

Precedex.  He is very anxious.  His panicky.  He is restless. Precedex is 

running at micrograms per kilogram per minute.  As far as his treatment, the 

patient remains on IV Solu Medrol 60 mg every 6 hours.  He is also on Lovenox 40

mg subcu every 24 hours.  Chest x-ray from today is showing stable bilateral 

pulmonary infiltrates, essentially unchanged compared to yesterday.  Doppler of 

the lower extremities was done yesterday showed no evidence of DVT.  His 

inflammatory markers today is showing a d-dimer of 4.1 from yesterday.  LDH is 

01/04/2009, higher, CRP is 5.7, able.  Rest of the electrodes are stable, BUN is

40 with a creatinine of 0.8.  His white cell count is currently at 20 and 

hemoglobin is at 16.  His net fluid balance has been -2.6 L and the patient was 

given Lasix yesterday and the patient was Negative Fluid Balance.





04/15/2021 the patient is being seen for a follow-up.  He obviously not 

intubated yesterday as the patient calmed down considerably with utilization of 

Precedex and morphine.  Nevertheless, his sister status remains borderline and 

currently is still on a BiPAP at a pressure of 16/12 and FiO2 of 100% and the 

patient is also on Precedex and points Precedex at 0.7 mcg/kg per minute and 

morphine as needed.  He is not was essentially uneventful.  His current pulse ox

is around 89%.  The easily desaturates.  He had several events yesterday with hi

s pulse ox dropped to lower levels and is taking them quite some time until he 

recovers probably in the order of 30 minutes to 45 minutes.  During this time 

the patient becomes quite panicky restless and short of breath.  We have still 

avoided intubation on this patient and were continuing the supportive care with 

noninvasive positive pressure ventilation.  The chest x-ray remains stable 

without any interval change and there is some lower lobe at the pulmonary 

infiltrates.  The patient's white cell count is 18.4 with a hemoglobin of 16.8. 

His electrolytes are normal, renal function is showing a stable creatinine of 

0.9, rest of the inflammatory markers are still pending for now.  Fluid balance 

has been in the order of -2.6 L for yesterday and the patient is headed towards 

more negative fluid balance for today.  The patient remains on IV Solu-Medrol 60

mg every 6 hours.  He is on Lovenox therapeutic dose of 100 mg by mouth every 12

hours therapeutic dose as well as utilized as the patient was getting 

progressively more hypoxic and pulmonary embolism was a constellation.  The 

patient was unable to undergo a CT angiogram because of his very limited 

pulmonary reserve and borderline respiratory status.  Doppler of the lower 

extremities were obviously negative.  No signs of bleeding and was going to 

continue the therapy goes of Lovenox for another 24 hours.  D-dimer from today 

is pending for now.  Clinically, he is resting comfortably in bed.  His night 

was otherwise uneventful.  A Devlin catheter was also inserted yesterday is 

limited his mobility.





04/16/2021, the patient remains on a BiPAP of 16/12 cm of water with an FiO2 of 

100%.  His current respiratory rate is in the low 30s.  He seems to be 

comfortable, he had very much dependent on a BiPAP and the patient has limited 

reserve and easily desaturates.  His been on BiPAP for the past 3 days for now. 

This is making his nose irritated in the mouth dry.  The chest x-ray from today 

still pending.  Yesterday's chest x-ray was reviewed and the findings were 

essentially stable.  To control his increased level of anxiety and synchrony 

with the noninvasive positive pressure ventilator, and without Precedex which is

currently running at 0.7 mcg/kg per minute and this has done significant 

improvement in terms of lowering his respiratory rate and taking control of his 

anxiety.  He is on TPN for nutritional support for now.  He has a PICC line in 

his left upper extremity.  In terms of therapy, he remains on Lovenox 1 mg every

every 12 hours.  He remains on IV Solu Medrol 60 mg IV every 6 hours.  

Inflammatory markers on today's evaluation showed a d-dimer of 1.77, his LDH 

level is 1371 and his CRP level is at 6.3.  His electrolytes are all within 

normal limits.  His fluid balance over the past 24 hours is negative for 54 mL 

and the patient was being diuresed with Lasix.  He does not have a lazy 

extending dose for now.  He was also given Diflucan regarding the possibility of

some oropharyngeal candidiasis.  IV fluids currently running at 20 mL an hour.  

TPN is currently running at 30 mL an hour and the patient currently has a Devlin 

catheter in place.





04/17/2021, the patient is still on BiPAP.  He was on BiPAP overnight and is 

getting quite anxious and tired of being on the BiPAP.  Current BiPAP settings 

of 16/12 an FiO2 of 100%.  Current pulse ox is ranging between 84 and 87%.  He 

wants to try to high flow oxygen system again.  His white cell count is at 31.  

His d-dimer is at 1.5.  His LDH level is 1533 and his CRP level is at 0.5.  His 

chest x-ray from today is showing infiltrates in lung bases bilaterally, 

essentially unchanged compared to the yesterday's chest film.  I was told by the

nursing staff that even while receiving oral care.  He is still on Precedex at 

0.7 mcg/kg per minute.  He remains on IV Solu Medrol 60 mg every 6 hours.  I 

will today's condition essentially unchanged..  Have some oropharyngeal 

candidiasis for which she was started on Diflucan.  He remains on TPN for nutri

tional support which is running at 30 mL an hour.  He is awake and alert and is,

indicating.  Altered mentation.  Found the week.  Quite discouraged because of 

his ongoing respiratory failure.  Unable to use a incentive spirometer as the 

patient is currently strictly on BiPAP.  He is not tachycardic.  His current 

respiratory rate is in the mid 20s.  He is able to generate higher tidal volumes

and his minute ventilation continues to be quite elevated at 30





04/18/2021, the patient remains unchanged and this is quite discouraging as the 

patient is not doing any significant progress.  He remains very much BiPAP 

dependent.  Yesterday with ecchymosis the BiPAP for 5 minutes and he 

decompensated with his pulse ox dropped down to the low 60s and he became 

extremely tachypneic.  Even now, with movement and limited activity, the patient

desaturates and is taken in quite some time to recover.  His chest x-ray still 

showing bilateral lower lobe pulmonary infiltrates consistent with COVID-19 

related pneumonia/ARDS.  The patient remains on BiPAP this morning at a pressure

of 16/12 with an FiO2 of 100%.  LDH level remains elevated at 1486 and the CRP 

level is down to 0.7.  The d-dimer currently is at 1.6.  It is of treatment, the

patient is on IV Solu Medrol 60 mg every 6 hours.  He remains on Precedex at 0 

point have and microvascular kilogram per minute.  Precedex has made a much more

comfortable and less agitated.  He is awake.  Is arousable.  He cannot eat.  He 

is on TPN for nutritional support.  He was started on Diflucan for some 

oropharyngeal candidiasis.  TPN is running at the rate of 85 mL an hour.  In 

terms of his blood work and labs, white cell count was 25 and a platelet count 

was 198 slightly lower, his electrolytes are all within normal limits.  Total 

protein is down to 5 with albumin level of 2.6.  His triglyceride level is at 

379.  He is weak.  He is in bed most of the time.  Unable to stop on a chair.  

As mentioned, he carries a high risk of failing respiratory status requiring 

intubation mechanical ventilation.





04/19/2021, the patient is being seen in the intensive care unit.  This is a 

case of COVID-19 related pneumonia and ARDS.  At around midnight and on, the 

patient progressively become more hypoxic and his pulse ox dropped in the low 

70s.  At that point, the decision was to proceed with intubation mechanical 

ventilation.  Note that the patient was on BiPAP for several days and he was 

still hanging on with a pulse ox in the mid 80s and low 90s.  Nevertheless, 

overnight, he decompensated and he became more agitated tachycardic restless and

hypotensive.  As such, the decision was to proceed with intubation mechanical 

ventilation.  The patient is currently intubated.  Post intubation, we had 

significant difficulties with respiratory acidosis, hypotension, a synchrony 

with the mechanical ventilator and ongoing hypoxemia.  As such, the patient was 

immediately sedated and paralyzed.  Currently propofol is running at 75 mcg/kg 

per minute and fentanyl is running at 0.5 mcg/kg per minute.  Patient is also 

paralyzed with Nimbex at 2 mcg/kg per minute.  The patient is currently 

mechanically ventilated.  I did a lot of vent changes throughout the night and 

the most current vent setting includes an assist-control of 30 with an FiO2 of 

100% and a PEEP of 20 with a tidal volume of 350.  The patient's peak airway 

patient is 40.  Static pressure is 39.  The patient's chest x-ray showing 

extensive consolidation of the lower lobes more so on the right and as such I 

suspect a superimposed infection the right lower lobe.  Hemodynamically, the pat

ient received IV fluids and he received a total of 2 L and currently IV fluids 

running at 0.9 at 150 mL an hour.  I made recommendations to stop the TPN for 

now.  Meanwhile, the patient will be also covered with broad-spectrum 

antibiotics with a combination of cefepime, vancomycin and Eraxis.  Diflucan was

be discontinued.  Cultures will be sent.  The patient is oliguric and his skin 

is getting mottled and he is becoming more cyanotic.  In terms of labs, the most

recent blood gases available prior to the vent setting change includes a pH of 

7.06 with a pCO2 of 100 and pO2 of 58.  His blood work from this morning shows a

BUN of 32 with a creatinine of 0.7.  His white cell count is up to 55.3 with a 

hemoglobin of 16.7.  His LDH is up to 2094 and the CRP is at 3.6.  LFTs are also

on the rise.  Rest of the electrolytes are all within normal limits.  The 

patient is currently sedated and paralyzed, he is hemodynamically unstable in 

follow-up.   He is also on pressors and norepinephrine infusion is running at 

0.1 mcg/kg per minute.  Follow-up blood gases are pending for now.  





Patient was reevaluated today on 4/19/2021, patient was already seen by Dr. Cote, apparently around midnight the patient had a downhill course, became 

more hypoxic, and his O2 saturation dropped to the 70s.  Patient was intubated, 

he was initially on BiPAP, and now he is intubated and mechanically ventilated. 

Patient is now on assist control rate of 40, volume is 375 FiO2 100% PEEP of 20.

 ABG post intubation showed a pO2 of 70 pCO2 of 101 pH of 7.06.  Patient is on 

Nimbex, fentanyl, norepinephrine at 0.1, and propofol at 75.  Chest x-ray 

continues to show significant bilateral infiltrates worse on the right side.  

Considering his leukocytosis, patient was placed on vancomycin, cefepime, and on

Eraxis.  We change his TPN to enteral feeding.  And I plan to have a repeat ABG 

in 2 hours.  His IV fluid is down to 100 mL per hour, and I recommended a 1 L of

fluid boluses for low blood pressure in spite of norepinephrine.








Objective





- Vital Signs


Vital signs: 


                                   Vital Signs











Temp  98.2 F   04/19/21 08:00


 


Pulse  141 H  04/19/21 11:00


 


Resp  40 H  04/19/21 11:00


 


BP  139/87   04/19/21 11:00


 


Pulse Ox  81 L  04/19/21 11:00








                                 Intake & Output











 04/18/21 04/19/21 04/19/21





 18:59 06:59 18:59


 


Intake Total 7688.062 9223.158 5372.402


 


Output Total 760 780 65


 


Balance 654.804 770.489 3955.402


 


Weight  98.8 kg 


 


Intake:   


 


   1167 4159


 


    Sodium Acetate 30 meq  935 





    Potassium Phosphate 9   





    mmol Calcium Gluconate 0.   





    5 gm In Amino Acids 5 %/   





    Dextrose 20 % 1,000 ml @   





    85 mls/hr IV .BY DURATION   





    SHAZIA Rx#:[id number]   


 


    Sodium Chloride 0.9% 1, 260 220 150





    000 ml @ 100 mls/hr IV .   





    Q10H SHAZIA Rx#:[id number]   


 


    Sodium Chloride 0.9% 1,   4000





    000 ml @ 999 mls/hr IV .   





    Q1H1M ONE Rx#:[id number]   


 


    pressure bag  12 9


 


  Intake, IV Titration 1154.804 845.653 0474.402





  Amount   


 


    Cisatracurium 200 mg In  16.104 





    Sodium Chloride 0.9% 180   





    ml @ 1 MCG/KG/MIN 5.928   





    mls/hr IV .Q24H [facility] Rx#:   





    [id number]   


 


    Dexmedetomidine/0.9% NaCl 70.804 164.573 





    (Pmx) 400 mcg In Empty   





    Bag 1 bag @ Titrate IV .   





    Q0M [facility] Rx#:[id number]   


 


    Fluconazole in NaCl,Iso- 50  





    Osm 100 mg In Saline 1   





    50ml.bag @ 50 mls/hr IVPB   





    DAILY [facility] Rx#:[id number]   


 


    Mvi, Adult No.4 with Vit 1034  





    K 10 ml Trace (Conc-1Ml/   





    Dose) 1 ml Sodium Acetate   





    30 meq Potassium   





    Phosphate 9 mmol Calcium   





    Gluconate 0.5 gm In Amino   





    Acids 5 %/Dextrose 20 %   





    1,000 ml @ 85 mls/hr IV .   





    BY DURATION [facility] Rx#:   





    [id number]   


 


    Sodium Acetate 30 meq   1023





    Potassium Phosphate 9   





    mmol Calcium Gluconate 0.   





    5 gm In Amino Acids 5 %/   





    Dextrose 20 % 1,000 ml @   





    85 mls/hr IV .BY DURATION   





    [facility] Rx#:[id number]   


 


    propofoL 1,000 mg In  104.481 190.402





    Empty Bag 1 bag @ Titrate   





    IV .Q0M [facility] Rx#:   





    [id number]   


 


Output:   


 


  Urine 760 780 65


 


Other:   


 


  Voiding Method Indwelling Catheter Indwelling Catheter Indwelling Catheter








                       ABP, PAP, CO, CI - Last Documented











Arterial Blood Pressure        89/57

















- Exam








GENERAL EXAM: Revealed 50-year-old white male intubated and mechanically 

ventilated, sedated and paralyzed.


HEAD: Normocephalic.


EYES: Normal reaction of pupils, equal size.


NOSE: Clear with pink turbinates.


THROAT: No erythema or exudates.


NECK: No masses, no JVD.


CHEST: No chest wall deformity.


LUNGS: Symmetrical expansion crackles and rhonchi noted bilaterally.


CVS: Tachycardic, normal S1 and S2  no audible murmur, regular rhythm.


ABDOMEN: Soft nontender no megaly no rebound no guarding.


SKIN: No rashes


CENTRAL NERVOUS SYSTEM:  Sedated and paralyzed, cannot be assessed.


EXTREMITIES: There is no peripheral edema.  No clubbing, no cyanosis ,  This s

kin is becoming more monitored in the lower extremities.  Pulses are diminished.








- Labs


CBC & Chem 7: 


                                 04/19/21 04:15





                                 04/19/21 04:15


Labs: 


                  Abnormal Lab Results - Last 24 Hours (Table)











  04/18/21 04/19/21 04/19/21 Range/Units





  11:43 00:09 01:47 


 


WBC     (3.8-10.6)  k/uL


 


Neutrophils #     (1.3-7.7)  k/uL


 


Lymphocytes #     (1.0-4.8)  k/uL


 


Monocytes #     (0-1.0)  k/uL


 


D-Dimer     (<0.60)  mg/L FEU


 


ABG pH    7.33 L  (7.35-7.45)  


 


ABG pCO2     (35-45)  mmHg


 


ABG pO2    27 L*  ()  mmHg


 


ABG HCO3     (21-25)  mmol/L


 


ABG Total CO2    25 H  (19-24)  mmol/L


 


ABG O2 Saturation    45.5 L  (94-97)  %


 


BUN     (9-20)  mg/dL


 


Glucose     (74-99)  mg/dL


 


POC Glucose (mg/dL)  147 H  150 H   (75-99)  mg/dL


 


Calcium     (8.4-10.2)  mg/dL


 


Phosphorus     (2.5-4.5)  mg/dL


 


Ferritin     (22.0-322.0)  ng/mL


 


Total Bilirubin     (0.2-1.3)  mg/dL


 


AST     (17-59)  U/L


 


ALT     (4-49)  U/L


 


Alkaline Phosphatase     ()  U/L


 


Lactate Dehydrogenase     (313-618)  U/L


 


C-Reactive Protein     (<1.0)  mg/dL


 


Total Protein     (6.3-8.2)  g/dL


 


Albumin     (3.5-5.0)  g/dL














  04/19/21 04/19/21 04/19/21 Range/Units





  03:09 04:15 04:15 


 


WBC    55.3 H*  (3.8-10.6)  k/uL


 


Neutrophils #    53.1 H  (1.3-7.7)  k/uL


 


Lymphocytes #    0.6 L  (1.0-4.8)  k/uL


 


Monocytes #    1.3 H  (0-1.0)  k/uL


 


D-Dimer     (<0.60)  mg/L FEU


 


ABG pH  7.10 L*    (7.35-7.45)  


 


ABG pCO2  88 H*    (35-45)  mmHg


 


ABG pO2  64 L    ()  mmHg


 


ABG HCO3  27 H    (21-25)  mmol/L


 


ABG Total CO2  30 H    (19-24)  mmol/L


 


ABG O2 Saturation  82.3 L    (94-97)  %


 


BUN   32 H   (9-20)  mg/dL


 


Glucose   191 H   (74-99)  mg/dL


 


POC Glucose (mg/dL)     (75-99)  mg/dL


 


Calcium   8.3 L   (8.4-10.2)  mg/dL


 


Phosphorus   4.7 H   (2.5-4.5)  mg/dL


 


Ferritin   2442.6 H   (22.0-322.0)  ng/mL


 


Total Bilirubin   1.7 H   (0.2-1.3)  mg/dL


 


AST   90 H   (17-59)  U/L


 


ALT   219 H   (4-49)  U/L


 


Alkaline Phosphatase   160 H   ()  U/L


 


Lactate Dehydrogenase   2094 H   (313-618)  U/L


 


C-Reactive Protein   3.6 H   (<1.0)  mg/dL


 


Total Protein   5.5 L   (6.3-8.2)  g/dL


 


Albumin   2.8 L   (3.5-5.0)  g/dL














  04/19/21 04/19/21 04/19/21 Range/Units





  04:15 04:22 07:18 


 


WBC     (3.8-10.6)  k/uL


 


Neutrophils #     (1.3-7.7)  k/uL


 


Lymphocytes #     (1.0-4.8)  k/uL


 


Monocytes #     (0-1.0)  k/uL


 


D-Dimer  12.01 H    (<0.60)  mg/L FEU


 


ABG pH   7.06 L*  6.97 L*  (7.35-7.45)  


 


ABG pCO2   100 H*  >120 H*  (35-45)  mmHg


 


ABG pO2   58 L*  55 L*  ()  mmHg


 


ABG HCO3   29 H   (21-25)  mmol/L


 


ABG Total CO2   32 H   (19-24)  mmol/L


 


ABG O2 Saturation   76.6 L  71.2 L  (94-97)  %


 


BUN     (9-20)  mg/dL


 


Glucose     (74-99)  mg/dL


 


POC Glucose (mg/dL)     (75-99)  mg/dL


 


Calcium     (8.4-10.2)  mg/dL


 


Phosphorus     (2.5-4.5)  mg/dL


 


Ferritin     (22.0-322.0)  ng/mL


 


Total Bilirubin     (0.2-1.3)  mg/dL


 


AST     (17-59)  U/L


 


ALT     (4-49)  U/L


 


Alkaline Phosphatase     ()  U/L


 


Lactate Dehydrogenase     (313-618)  U/L


 


C-Reactive Protein     (<1.0)  mg/dL


 


Total Protein     (6.3-8.2)  g/dL


 


Albumin     (3.5-5.0)  g/dL














  04/19/21 Range/Units





  08:14 


 


WBC   (3.8-10.6)  k/uL


 


Neutrophils #   (1.3-7.7)  k/uL


 


Lymphocytes #   (1.0-4.8)  k/uL


 


Monocytes #   (0-1.0)  k/uL


 


D-Dimer   (<0.60)  mg/L FEU


 


ABG pH  7.06 L*  (7.35-7.45)  


 


ABG pCO2  101 H*  (35-45)  mmHg


 


ABG pO2  71 L  ()  mmHg


 


ABG HCO3  29 H  (21-25)  mmol/L


 


ABG Total CO2  32 H  (19-24)  mmol/L


 


ABG O2 Saturation  87.7 L  (94-97)  %


 


BUN   (9-20)  mg/dL


 


Glucose   (74-99)  mg/dL


 


POC Glucose (mg/dL)   (75-99)  mg/dL


 


Calcium   (8.4-10.2)  mg/dL


 


Phosphorus   (2.5-4.5)  mg/dL


 


Ferritin   (22.0-322.0)  ng/mL


 


Total Bilirubin   (0.2-1.3)  mg/dL


 


AST   (17-59)  U/L


 


ALT   (4-49)  U/L


 


Alkaline Phosphatase   ()  U/L


 


Lactate Dehydrogenase   (313-618)  U/L


 


C-Reactive Protein   (<1.0)  mg/dL


 


Total Protein   (6.3-8.2)  g/dL


 


Albumin   (3.5-5.0)  g/dL














Assessment and Plan


Assessment: 





Impression:


Acute hypoxic respiratory failure secondary to COVID-19 pneumonia and ARDS.


Acute septic shock is seriously considered, although hypovolemic shock is not 

entirely ruled out, but felt to be less likely.


Elevated inflammatory markers seconded to COVID-19 infection.


Worsening leukocytosis, suspect ongoing sepsis.


Oropharyngeal candidiasis.


Profound medical debility and suspect critical illness polyneuropathy.





Recommendation:


Continue ventilatory support.


Continue hemodynamic support.  Titrate norepinephrine accordingly.


Continue Decadron.


Continue cefepime and vancomycin and axis.  Check blood cultures.


Start patient on enteral feeding and discontinue TPN.


Check pro calcitonin level.


Adjust ventilator settings according to ABG.


Prognosis remains extremely poor and guarded.


Continue sodium bicarb for his acidosis.


Patient received toci , and he received 1 unit of convalescent plasma.


Continue Decadron.


10 GI and DVT prophylaxis.


Patient is extremely ill, and suspect poor prognosis.  Critical care time is 

over 30 minutes.


Time with Patient: Greater than 30

## 2021-04-19 NOTE — XR
EXAM:

  XR Chest, 1 View

 

CLINICAL HISTORY:

  ITS.REASON XR Reason: Tube placement

 

TECHNIQUE:

  Frontal view of the chest.

 

COMPARISON:

  4/18/2021.

 

FINDINGS:

  Lungs:  Patchy airspace disease is noted predominantly at the lung 

bases compatible with by basilar pneumonias.

  Pleural space:  Unremarkable.  No pneumothorax.

  Heart:  Unremarkable.  No cardiomegaly.

  Mediastinum:  Unremarkable.

  Bones/joints:  Osteopenia.

  Tubes, lines and devices:  Endotracheal tube is noted in place with its 

tip at the thoracic inlet above the paul.  NG tube is noted in place 

with its tip below the diaphragm, in good position.  Left-sided PICC line 

catheter is noted in place with its tip at the level of the mid superior 

vena cava.

 

IMPRESSION:     

  Endotracheal and NG tubes are noted in place in good position.

## 2021-04-20 LAB
ALBUMIN SERPL-MCNC: 2 G/DL (ref 3.5–5)
ALP SERPL-CCNC: 156 U/L (ref 38–126)
ALT SERPL-CCNC: 288 U/L (ref 4–49)
ANION GAP SERPL CALC-SCNC: 5 MMOL/L
AST SERPL-CCNC: 147 U/L (ref 17–59)
BASOPHILS # BLD AUTO: 0.1 K/UL (ref 0–0.2)
BASOPHILS NFR BLD AUTO: 0 %
BUN SERPL-SCNC: 47 MG/DL (ref 9–20)
CALCIUM SPEC-MCNC: 6.5 MG/DL (ref 8.4–10.2)
CHLORIDE SERPL-SCNC: 110 MMOL/L (ref 98–107)
CK SERPL-CCNC: 264 U/L (ref 55–170)
CO2 BLDA-SCNC: 25 MMOL/L (ref 19–24)
CO2 SERPL-SCNC: 22 MMOL/L (ref 22–30)
EOSINOPHIL # BLD AUTO: 0.1 K/UL (ref 0–0.7)
EOSINOPHIL NFR BLD AUTO: 0 %
ERYTHROCYTE [DISTWIDTH] IN BLOOD BY AUTOMATED COUNT: 4.81 M/UL (ref 4.3–5.9)
ERYTHROCYTE [DISTWIDTH] IN BLOOD: 13.3 % (ref 11.5–15.5)
FERRITIN SERPL-MCNC: 2817.8 NG/ML (ref 22–322)
GLUCOSE BLD-MCNC: 121 MG/DL (ref 75–99)
GLUCOSE BLD-MCNC: 144 MG/DL (ref 75–99)
GLUCOSE BLD-MCNC: 144 MG/DL (ref 75–99)
GLUCOSE BLD-MCNC: 98 MG/DL (ref 75–99)
GLUCOSE SERPL-MCNC: 102 MG/DL (ref 74–99)
HCO3 BLDA-SCNC: 22 MMOL/L (ref 21–25)
HCT VFR BLD AUTO: 45.2 % (ref 39–53)
HGB BLD-MCNC: 13.9 GM/DL (ref 13–17.5)
LDH SPEC-CCNC: 2300 U/L (ref 313–618)
LYMPHOCYTES # SPEC AUTO: 0.4 K/UL (ref 1–4.8)
LYMPHOCYTES NFR SPEC AUTO: 1 %
MAGNESIUM SPEC-SCNC: 1.7 MG/DL (ref 1.6–2.3)
MCH RBC QN AUTO: 28.9 PG (ref 25–35)
MCHC RBC AUTO-ENTMCNC: 30.8 G/DL (ref 31–37)
MCV RBC AUTO: 94 FL (ref 80–100)
MONOCYTES # BLD AUTO: 1.4 K/UL (ref 0–1)
MONOCYTES NFR BLD AUTO: 3 %
NEUTROPHILS # BLD AUTO: 50.2 K/UL (ref 1.3–7.7)
NEUTROPHILS NFR BLD AUTO: 96 %
PCO2 BLDA: 98 MMHG (ref 35–45)
PH BLDA: 6.96 [PH] (ref 7.35–7.45)
PLATELET # BLD AUTO: 166 K/UL (ref 150–450)
PO2 BLDA: 72 MMHG (ref 83–108)
POTASSIUM SERPL-SCNC: 7.1 MMOL/L (ref 3.5–5.1)
PROT SERPL-MCNC: 4.3 G/DL (ref 6.3–8.2)
SODIUM SERPL-SCNC: 137 MMOL/L (ref 137–145)
WBC # BLD AUTO: 52.4 K/UL (ref 3.8–10.6)

## 2021-04-20 PROCEDURE — 06HY33Z INSERTION OF INFUSION DEVICE INTO LOWER VEIN, PERCUTANEOUS APPROACH: ICD-10-PCS

## 2021-04-20 PROCEDURE — 5A1D70Z PERFORMANCE OF URINARY FILTRATION, INTERMITTENT, LESS THAN 6 HOURS PER DAY: ICD-10-PCS

## 2021-04-20 RX ADMIN — NOREPINEPHRINE BITARTRATE SCH MLS/HR: 1 INJECTION, SOLUTION, CONCENTRATE INTRAVENOUS at 03:15

## 2021-04-20 RX ADMIN — DILTIAZEM HYDROCHLORIDE SCH MLS/HR: 5 INJECTION INTRAVENOUS at 16:40

## 2021-04-20 RX ADMIN — ACETAMINOPHEN PRN MG: 325 TABLET, FILM COATED ORAL at 01:15

## 2021-04-20 RX ADMIN — NOREPINEPHRINE BITARTRATE SCH MLS/HR: 1 INJECTION, SOLUTION, CONCENTRATE INTRAVENOUS at 10:37

## 2021-04-20 RX ADMIN — DEXTROSE SCH MG: 50 INJECTION, SOLUTION INTRAVENOUS at 07:45

## 2021-04-20 RX ADMIN — INSULIN ASPART SCH: 100 INJECTION, SOLUTION INTRAVENOUS; SUBCUTANEOUS at 00:00

## 2021-04-20 RX ADMIN — TAMSULOSIN HYDROCHLORIDE SCH MG: 0.4 CAPSULE ORAL at 07:50

## 2021-04-20 RX ADMIN — CHLORHEXIDINE GLUCONATE SCH ML: 1.2 RINSE ORAL at 20:09

## 2021-04-20 RX ADMIN — INSULIN ASPART SCH UNIT: 100 INJECTION, SOLUTION INTRAVENOUS; SUBCUTANEOUS at 18:16

## 2021-04-20 RX ADMIN — CEFAZOLIN SCH MLS/HR: 330 INJECTION, POWDER, FOR SOLUTION INTRAMUSCULAR; INTRAVENOUS at 06:43

## 2021-04-20 RX ADMIN — BACITRACIN ZINC AND POLYMYXIN B SULFATE SCH APPLIC: 500; 10000 OINTMENT TOPICAL at 21:17

## 2021-04-20 RX ADMIN — NOREPINEPHRINE BITARTRATE SCH MLS/HR: 1 INJECTION, SOLUTION, CONCENTRATE INTRAVENOUS at 06:51

## 2021-04-20 RX ADMIN — NOREPINEPHRINE BITARTRATE SCH MLS/HR: 1 INJECTION, SOLUTION, CONCENTRATE INTRAVENOUS at 09:09

## 2021-04-20 RX ADMIN — NOREPINEPHRINE BITARTRATE SCH MLS/HR: 1 INJECTION, SOLUTION, CONCENTRATE INTRAVENOUS at 00:39

## 2021-04-20 RX ADMIN — CISATRACURIUM BESYLATE SCH MLS/HR: 10 INJECTION INTRAVENOUS at 06:43

## 2021-04-20 RX ADMIN — ALBUTEROL SULFATE PRN PUFF: 90 AEROSOL, METERED RESPIRATORY (INHALATION) at 07:58

## 2021-04-20 RX ADMIN — NOREPINEPHRINE BITARTRATE SCH MLS/HR: 1 INJECTION, SOLUTION, CONCENTRATE INTRAVENOUS at 16:35

## 2021-04-20 RX ADMIN — NOREPINEPHRINE BITARTRATE SCH MLS/HR: 1 INJECTION, SOLUTION, CONCENTRATE INTRAVENOUS at 19:57

## 2021-04-20 RX ADMIN — NOREPINEPHRINE BITARTRATE SCH MLS/HR: 1 INJECTION, SOLUTION, CONCENTRATE INTRAVENOUS at 12:10

## 2021-04-20 RX ADMIN — CEFEPIME HYDROCHLORIDE SCH MLS/HR: 2 INJECTION, POWDER, FOR SOLUTION INTRAVENOUS at 05:05

## 2021-04-20 RX ADMIN — OXYCODONE HYDROCHLORIDE AND ACETAMINOPHEN SCH MG: 500 TABLET ORAL at 07:50

## 2021-04-20 RX ADMIN — Medication SCH MCG: at 07:50

## 2021-04-20 RX ADMIN — DEXTRAN 70 AND HYPROMELLOSE 2910 SCH DROPS: 1; 3 SOLUTION/ DROPS OPHTHALMIC at 08:37

## 2021-04-20 RX ADMIN — SODIUM CHLORIDE SCH MLS/HR: 9 INJECTION, SOLUTION INTRAVENOUS at 07:44

## 2021-04-20 RX ADMIN — DEXTRAN 70 AND HYPROMELLOSE 2910 SCH DROPS: 1; 3 SOLUTION/ DROPS OPHTHALMIC at 16:02

## 2021-04-20 RX ADMIN — NOREPINEPHRINE BITARTRATE SCH MLS/HR: 1 INJECTION, SOLUTION, CONCENTRATE INTRAVENOUS at 06:02

## 2021-04-20 RX ADMIN — NOREPINEPHRINE BITARTRATE SCH MLS/HR: 1 INJECTION, SOLUTION, CONCENTRATE INTRAVENOUS at 04:43

## 2021-04-20 RX ADMIN — Medication SCH MG: at 07:50

## 2021-04-20 RX ADMIN — NOREPINEPHRINE BITARTRATE SCH MLS/HR: 1 INJECTION, SOLUTION, CONCENTRATE INTRAVENOUS at 21:18

## 2021-04-20 RX ADMIN — DEXTRAN 70 AND HYPROMELLOSE 2910 SCH DROPS: 1; 3 SOLUTION/ DROPS OPHTHALMIC at 20:43

## 2021-04-20 RX ADMIN — DEXTRAN 70 AND HYPROMELLOSE 2910 SCH DROPS: 1; 3 SOLUTION/ DROPS OPHTHALMIC at 12:12

## 2021-04-20 RX ADMIN — NOREPINEPHRINE BITARTRATE SCH MLS/HR: 1 INJECTION, SOLUTION, CONCENTRATE INTRAVENOUS at 08:00

## 2021-04-20 RX ADMIN — NOREPINEPHRINE BITARTRATE SCH MLS/HR: 1 INJECTION, SOLUTION, CONCENTRATE INTRAVENOUS at 01:53

## 2021-04-20 RX ADMIN — INSULIN ASPART SCH: 100 INJECTION, SOLUTION INTRAVENOUS; SUBCUTANEOUS at 05:44

## 2021-04-20 RX ADMIN — SODIUM CHLORIDE SCH MLS/HR: 900 INJECTION, SOLUTION INTRAVENOUS at 11:29

## 2021-04-20 RX ADMIN — ANIDULAFUNGIN SCH MLS/HR: 100 INJECTION, POWDER, LYOPHILIZED, FOR SOLUTION INTRAVENOUS at 08:47

## 2021-04-20 RX ADMIN — CHLORHEXIDINE GLUCONATE SCH ML: 1.2 RINSE ORAL at 07:45

## 2021-04-20 RX ADMIN — CEFAZOLIN SCH MLS/HR: 330 INJECTION, POWDER, FOR SOLUTION INTRAMUSCULAR; INTRAVENOUS at 03:40

## 2021-04-20 RX ADMIN — ENOXAPARIN SODIUM SCH MG: 40 INJECTION SUBCUTANEOUS at 07:45

## 2021-04-20 RX ADMIN — PANTOPRAZOLE SODIUM SCH MG: 40 INJECTION, POWDER, FOR SOLUTION INTRAVENOUS at 07:45

## 2021-04-20 RX ADMIN — CISATRACURIUM BESYLATE SCH MLS/HR: 10 INJECTION INTRAVENOUS at 19:57

## 2021-04-20 RX ADMIN — INSULIN ASPART SCH: 100 INJECTION, SOLUTION INTRAVENOUS; SUBCUTANEOUS at 12:34

## 2021-04-20 RX ADMIN — CEFAZOLIN SCH MLS/HR: 330 INJECTION, POWDER, FOR SOLUTION INTRAMUSCULAR; INTRAVENOUS at 16:01

## 2021-04-20 RX ADMIN — NOREPINEPHRINE BITARTRATE SCH MLS/HR: 1 INJECTION, SOLUTION, CONCENTRATE INTRAVENOUS at 18:39

## 2021-04-20 RX ADMIN — CEFAZOLIN SCH MLS/HR: 330 INJECTION, POWDER, FOR SOLUTION INTRAMUSCULAR; INTRAVENOUS at 20:00

## 2021-04-20 RX ADMIN — CEFEPIME HYDROCHLORIDE SCH MLS/HR: 1 INJECTION, POWDER, FOR SOLUTION INTRAMUSCULAR; INTRAVENOUS at 18:16

## 2021-04-20 RX ADMIN — AMIODARONE HYDROCHLORIDE SCH MLS/HR: 50 INJECTION, SOLUTION INTRAVENOUS at 18:52

## 2021-04-20 RX ADMIN — DEXTRAN 70 AND HYPROMELLOSE 2910 SCH DROPS: 1; 3 SOLUTION/ DROPS OPHTHALMIC at 03:39

## 2021-04-20 NOTE — XR
EXAMINATION TYPE: XR chest 1V portable

 

DATE OF EXAM: 4/20/2021

 

COMPARISON: Chest x-ray 4/19/2021

 

HISTORY: Intubated, abnormal chest x-ray

 

TECHNIQUE: Single frontal view of the chest is obtained.

 

FINDINGS:  Endotracheal tube has withdrawn to the level of the thoracic inlet, approximately T1. Orog
astric tube remains in place. Bilateral airspace disease shows a similar appearance. There is a left-
sided PICC line present with the tip over the superior vena cava.

 

IMPRESSION:  Findings are similar, endotracheal tube has withdrawn to approximately T1 level.

## 2021-04-20 NOTE — P.PCN
Date of Procedure: 04/20/21


Preoperative Diagnosis: 


Acute renal failure, COVID


Postoperative Diagnosis: 


Same


Procedure(s) Performed: 


Right common femoral vein hemodialysis catheter placement under ultrasound 

guidance


Anesthesia: local


Surgeon: Lemuel Baca


Estimated Blood Loss (ml): 5


Pathology: none sent


Condition: stable


Disposition: ICU


Indications for Procedure: 


50 year old male who is in the ICU currently being treated for COVID pneumonia 

and shock who developed acute renal failure in need of hemodialysis.


Description of Procedure: 


After written and informed consent was obtained from the patients wife and all 

risks, benefits and complications were described the procedure was done at 

bedside.  The area of the right groin was prepped and draped in usual sterile 

fashion.  Using an ultrasound the right common femoral vein was visualized and 

shown to be patent without any thrombus.  Under ultrasound guidance the vein was

cannulated and dark non pulsatile blood flow was visualized.  A guidewire was 

placed and serial dilation was performed and a 20cm straight Mahurkar catheter 

was placed over the wire. The wire was removed and catheter was flushed easily 

and heparin saline was placed.  The catheter was then secured in place with 

nylon suture and dressings placed.  The patient tolerated the procedure well.

## 2021-04-20 NOTE — P.PN
Subjective


Progress Note Date: 04/20/21


Malcom Griffith is a 49 yo M with PMH of allergies who presented to the ED via EMS

with worsening malaise and shortness of breath. He states he developed a cough 

and sore throat about 2 weeks ago which has worsened since then. He complains of

fever chills and shortness of breath. On arrival he was febrile tachypenic and 

hypoxic SpO2 94% on 4 L O2. WBC 5.8, lymphopenia, , , COVID 

positive, CXR with coarse bilateral infiltrates.





[id number] maintained on 90% airflow, O2 sats in the low 90s.  Positive 

nonproductive cough .  Outside window for Remdesevir, continue on steroids, 

vitamins,lovenox. Chest pain, palpitations, complains of chronic back pain.  T-

max 100.2.  Preliminary blood cultures reporting no growth at 24 hours





04/05/2021 status post convalescent plasma, TOCI, maintaining O2 sats in the 90s

on 100% BiPAP.  Chest x-ray reporting stable diffuse bilateral infiltrates.  

Afebrile, T-max 99.2, WBC 14.7.  Hemoglobin 15.7, platelets 321.  BUN 41, 

creatinine 0.94. D-dimer 1.45, ferritin 1511.4, LDH 1025, CRP 54.1.





04/06/2021 alternating between BiPAP and 15 L high flow nasal cannula combined 

with nonrebreather, maintaining O2 sats in the high 80s.  Chest x-ray reporting 

diffuse bilateral infiltrates stable. T-max 99,WBC 14.  Hemoglobin 14.7, 

platelets 328 D-dimer 1.76, ferritin and CRP decreased.  BUN 39, creatinine 

0.91.








04/07/2021 maintained on 15 L-nasal cannula with  nonrebreather mask, IV 

steroids, bronchodilators,maintaining O2 sats in the high 80s.  Did not Require 

BiPAP during the night.  Feels better.  Nonproductive cough.  Complains of mild 

chest tightness with coughing. Chest x-ray reporting stable diffuse bilateral 

infiltrate.  Afebrile, WBC 13.6.  Hemoglobin 14.8, platelets 300. BUN 33, 

creatinine 0.86.  Blood sugars controlled. LDH elevated, 1127, CRP down to 17.6.










04/08/2021  continue on IV steroids, vitamins, maintaining O2 sats in the mid to

high 80s on 15 L nasal cannula plus nonrebreather.  Nonproductive cough.  

Reports she feels better.  Chest x-ray reporting no change in bilateral 

infiltrates.  Afebrile.  Creatinine 0.72.





04/09/2021 Continues on airflow high flow oxygen at 60 L/m and 90% FiO2 along 

with a nonrebreather mask, maintaining O2 sats in the high 80s.   Minimal cough.

 Complains of dry mouth.  He reports, continues to feel better.  Afebrile, WBC 

down to 12.2.  D-dimer, CRP decreased.  LDH increased ,1196.





04/12/2021 maintained on high flow nasal cannula 60 L in addition to 

nonrebreather maintaining better O2 sats  in the high 90s to 100%.  Chest x-ray 

reporting stable bilateral infiltrates .complains of exertional shortness of 

breath. Afebrile, T-max 99.5, WBC 19.  D-dimer, ferritin, LDH  decreased, CRP 

8.1.  BUN 25, creatinine 0.75.  Scheduled to have midline placed today








04/13/2021 rough night, Precedex initiated and patient placed on BiPAP.  This 

morning ,desating down into the 60s, on BiPAP.FiO2 adjusted, with reported O2 

sats improved into the 80s.Chest x-ray reporting stable  patchy bilateral Inf

iltrates.  Doppler reporting negative for DVT of bilateral lower extremities.  

Inflammatory markers elevated.








04/14/2021 difficulty oxygenating yesterday, converted over to BiPAP, currently 

on 100% FiO2 maintaining O2 sats of 63 to low 80s. Unstable to travel for CTA. 

Maintained on Lovenox . Continues on Precedex, covid cocktail, including IV 

Solu-Medrol.Chest x-ray reporting similar to prior exam.  Received Lasix 

yesterday, diuresed well with 24-hour I&O reflecting a negative fluid balance.  

BUN 40, creatinine 0.88.  Afebrile, WBC 20. 








04/15/2021 anxiety significantly improved on Precedex and morphine.  Maintaining

O2 sats in the high 80s on 100% BiPAP pressure 16/12.  Chest x-ray reporting 

some slight improved aeration at the right lung base.  





Therapeutic dosed Lovenox; unstable to travel for CTA to rule out PE .Afebrile, 

WBC 18.4.  Diuresing and remains in a negative fluid balance.  BUN 50, 

creatinine 0.99.








04/16/2021 continues to require BiPAP 100% FiO2, maintaining O2 sats high 80s to

low 90s.  D-dimer 1.77. therapeutic dosed Lovenox decreased. LDH 1371, CRP 6.3. 

24-hour I&O remains in a negative fluid balance. BUN 47, creatinine 0.78.   TPN 

for nutritional support ,Blood sugars controlled.T bili 1.4, ALT 1:15.  

Afebrile, WBC 21.3.








04/19/2021 worsening respiratory status with hypoxia throughout the night, 

requiring intubation in the early morning hours. Vent dependent, FiO2 100%, FiO2

20 %, ABGs noted.  Chest x-ray reporting unchanged patchy bibasilar opacities 

right greater than left.  Maintained on Nimbex, fentanyl, Levophed and diprovan 

drips and fluid bolus.  WBC up to 55.3 ,continues on Eraxis, cefepime and 

vancomycin.  Serial ABGs noted.








04/20/2021 vent-dependent, FiO2 100%, PEEP 20.  Sedated on fentanyl, diprovan. 

Requiring pressor support with both Levophed and vasopressin.  Critical 

potassium 7.1, bicarb 22, BUN 47, creatinine 3.59.  Received hyperkalemic 

cocktail, potassium down to 6.1, nephrology consulted.  Bicarb drip initiated.  

Vascular surgery consulted for emergent dialysis catheter placement.  Maintained

 on vancomycin, cefepime, and Eraxis,blood culture positive for staph aureus, 

final sensitivity is pending, sputum pending. Chest x-ray reporting similar 

findings.  T-max 101.3 WBC 52.4.  D-dimer 3.51, LDH 2300, , CRP 7.3.





Objective





- Vital Signs


Vital signs: 


                                   Vital Signs











Temp  98.9 F   04/20/21 16:00


 


Pulse  124 H  04/20/21 16:30


 


Resp  40 H  04/20/21 16:30


 


BP  139/87   04/20/21 16:00


 


Pulse Ox  91 L  04/20/21 16:30








                                 Intake & Output











 04/19/21 04/20/21 04/20/21





 18:59 06:59 18:59


 


Intake Total 8021.213 4310.613 2731.048


 


Output Total 210 23 5


 


Balance 7811.213 4287.613 2726.048


 


Weight 98.8 kg 103.5 kg 103.5 kg


 


Intake:   


 


  IV 6086 1439 827


 


    Calcium Gluconate 2 gm In  100 





    Sodium Chloride 0.9% 100   





    ml @ 100 mls/hr IVPB   





    ONCE ONE Rx#:[id number]   


 


    Dextrose 5% in Water 1,  200 800





    000 ml @ 100 mls/hr IV .   





    [id number] SHAZIA with Sodium   





    Bicarb (1 Meq/ml) 150 ml   





    Rx#:[id number]   


 


    Sodium Chloride 0.9% 1, 1050 1100 





    000 ml @ 100 mls/hr IV .   





    Q10H [facility]AZIA Rx#:[id number]   


 


    Sodium Chloride 0.9% 1, 5000  





    000 ml @ 999 mls/hr IV .   





    Q1H1M ONE Rx#:[id number]   


 


    pressure bag 36 39 27


 


  Intake, IV Titration 2971.326 7136.613 1644.048





  Amount   


 


    Cisatracurium 200 mg In 120.338 162.822 





    Sodium Chloride 0.9% 180   





    ml @ 1 MCG/KG/MIN 5.928   





    mls/hr IV .Q24H [facility] Rx#:   





    [id number]   


 


    Norepinephrine 4 mg In 357.258 1630.078 1266.325





    Sodium Chloride 0.9% 250   





    ml @ 0.05 MCG/KG/MIN 18.   





    821 mls/hr IV .[id number]   





    [facility] Rx#:[id number]   


 


    Sodium Acetate 30 meq 1023  





    Potassium Phosphate 9   





    mmol Calcium Gluconate 0.   





    5 gm In Amino Acids 5 %/   





    Dextrose 20 % 1,000 ml @   





    85 mls/hr IV .BY DURATION   





    SHZAIA Rx#:[id number]   


 


    Sodium Chloride 0.9% 1,   100





    000 ml @ 100 mls/hr IV .   





    Q10H [facility] Rx#:[id number]   


 


    fentaNYL (PF). 1,000 mcg  66.031 77.723





    In Sodium Chloride 0.9%   





    80 ml @ Per Protocol IV .   





    Q0M [facility] Rx#:[id number]   


 


    propofoL 1,000 mg In 477.875 449.682 200





    Empty Bag 1 bag @ Titrate   





    IV .Q0M [facility] Rx#:   





    [id number]   


 


  Tube Feeding 30 210 260


 


  Other 30 90 


 


Output:   


 


  Urine 210 23 5


 


  Hemodialysis   0


 


Other:   


 


  Voiding Method Indwelling Catheter Indwelling Catheter Indwelling Catheter








                       ABP, PAP, CO, CI - Last Documented











Arterial Blood Pressure        92/43

















- Exam


Limited exam secondary to covid, full exam deferred to intensivist, in a patient

intubated





General: Sitting up in bed, intubated, sedated 


CV: Regular rate, tachycardic


Neuro: Unable to assess, patient sedated and on mechanical ventilation











- Labs


CBC & Chem 7: 


                                 04/20/21 03:40





                                 04/20/21 08:31


Labs: 


                  Abnormal Lab Results - Last 24 Hours (Table)











  04/19/21 04/19/21 04/19/21 Range/Units





  11:44 18:22 23:56 


 


WBC     (3.8-10.6)  k/uL


 


MCHC     (31.0-37.0)  g/dL


 


Neutrophils #     (1.3-7.7)  k/uL


 


Lymphocytes #     (1.0-4.8)  k/uL


 


Monocytes #     (0-1.0)  k/uL


 


D-Dimer     (<0.60)  mg/L FEU


 


ABG pH     (7.35-7.45)  


 


ABG pCO2     (35-45)  mmHg


 


ABG pO2     ()  mmHg


 


ABG Total CO2     (19-24)  mmol/L


 


ABG O2 Saturation     (94-97)  %


 


Potassium     (3.5-5.1)  mmol/L


 


Chloride     ()  mmol/L


 


BUN     (9-20)  mg/dL


 


Creatinine     (0.66-1.25)  mg/dL


 


Glucose     (74-99)  mg/dL


 


POC Glucose (mg/dL)   106 H  105 H  (75-99)  mg/dL


 


Calcium     (8.4-10.2)  mg/dL


 


Phosphorus     (2.5-4.5)  mg/dL


 


Ferritin     (22.0-322.0)  ng/mL


 


Total Bilirubin     (0.2-1.3)  mg/dL


 


AST     (17-59)  U/L


 


ALT     (4-49)  U/L


 


Alkaline Phosphatase     ()  U/L


 


Lactate Dehydrogenase     (313-618)  U/L


 


Creatine Kinase     ()  U/L


 


C-Reactive Protein     (<1.0)  mg/dL


 


Total Protein     (6.3-8.2)  g/dL


 


Albumin     (3.5-5.0)  g/dL


 


Triglycerides     (<150)  mg/dL


 


Procalcitonin  3.62 H    (0.02-0.09)  ng/mL














  04/20/21 04/20/21 04/20/21 Range/Units





  03:40 03:40 03:40 


 


WBC  52.4 H*    (3.8-10.6)  k/uL


 


MCHC  30.8 L    (31.0-37.0)  g/dL


 


Neutrophils #  50.2 H    (1.3-7.7)  k/uL


 


Lymphocytes #  0.4 L    (1.0-4.8)  k/uL


 


Monocytes #  1.4 H    (0-1.0)  k/uL


 


D-Dimer    3.51 H  (<0.60)  mg/L FEU


 


ABG pH     (7.35-7.45)  


 


ABG pCO2     (35-45)  mmHg


 


ABG pO2     ()  mmHg


 


ABG Total CO2     (19-24)  mmol/L


 


ABG O2 Saturation     (94-97)  %


 


Potassium     (3.5-5.1)  mmol/L


 


Chloride     ()  mmol/L


 


BUN     (9-20)  mg/dL


 


Creatinine     (0.66-1.25)  mg/dL


 


Glucose     (74-99)  mg/dL


 


POC Glucose (mg/dL)     (75-99)  mg/dL


 


Calcium     (8.4-10.2)  mg/dL


 


Phosphorus     (2.5-4.5)  mg/dL


 


Ferritin     (22.0-322.0)  ng/mL


 


Total Bilirubin     (0.2-1.3)  mg/dL


 


AST     (17-59)  U/L


 


ALT     (4-49)  U/L


 


Alkaline Phosphatase     ()  U/L


 


Lactate Dehydrogenase     (313-618)  U/L


 


Creatine Kinase     ()  U/L


 


C-Reactive Protein     (<1.0)  mg/dL


 


Total Protein     (6.3-8.2)  g/dL


 


Albumin     (3.5-5.0)  g/dL


 


Triglycerides   992 H   (<150)  mg/dL


 


Procalcitonin     (0.02-0.09)  ng/mL














  04/20/21 04/20/21 04/20/21 Range/Units





  04:30 05:25 08:31 


 


WBC     (3.8-10.6)  k/uL


 


MCHC     (31.0-37.0)  g/dL


 


Neutrophils #     (1.3-7.7)  k/uL


 


Lymphocytes #     (1.0-4.8)  k/uL


 


Monocytes #     (0-1.0)  k/uL


 


D-Dimer     (<0.60)  mg/L FEU


 


ABG pH   6.96 L*   (7.35-7.45)  


 


ABG pCO2   98 H*   (35-45)  mmHg


 


ABG pO2   72 L   ()  mmHg


 


ABG Total CO2   25 H   (19-24)  mmol/L


 


ABG O2 Saturation   92.9 L   (94-97)  %


 


Potassium  7.1 H*   6.1 H*  (3.5-5.1)  mmol/L


 


Chloride  110 H    ()  mmol/L


 


BUN  47 H    (9-20)  mg/dL


 


Creatinine  3.59 H    (0.66-1.25)  mg/dL


 


Glucose  102 H    (74-99)  mg/dL


 


POC Glucose (mg/dL)     (75-99)  mg/dL


 


Calcium  6.5 L    (8.4-10.2)  mg/dL


 


Phosphorus  8.0 H    (2.5-4.5)  mg/dL


 


Ferritin  2817.8 H    (22.0-322.0)  ng/mL


 


Total Bilirubin  1.7 H    (0.2-1.3)  mg/dL


 


AST  147 H    (17-59)  U/L


 


ALT  288 H    (4-49)  U/L


 


Alkaline Phosphatase  156 H    ()  U/L


 


Lactate Dehydrogenase  2300 H    (313-618)  U/L


 


Creatine Kinase  264 H    ()  U/L


 


C-Reactive Protein  7.3 H    (<1.0)  mg/dL


 


Total Protein  4.3 L    (6.3-8.2)  g/dL


 


Albumin  2.0 L    (3.5-5.0)  g/dL


 


Triglycerides     (<150)  mg/dL


 


Procalcitonin     (0.02-0.09)  ng/mL














  04/20/21 Range/Units





  12:24 


 


WBC   (3.8-10.6)  k/uL


 


MCHC   (31.0-37.0)  g/dL


 


Neutrophils #   (1.3-7.7)  k/uL


 


Lymphocytes #   (1.0-4.8)  k/uL


 


Monocytes #   (0-1.0)  k/uL


 


D-Dimer   (<0.60)  mg/L FEU


 


ABG pH   (7.35-7.45)  


 


ABG pCO2   (35-45)  mmHg


 


ABG pO2   ()  mmHg


 


ABG Total CO2   (19-24)  mmol/L


 


ABG O2 Saturation   (94-97)  %


 


Potassium   (3.5-5.1)  mmol/L


 


Chloride   ()  mmol/L


 


BUN   (9-20)  mg/dL


 


Creatinine   (0.66-1.25)  mg/dL


 


Glucose   (74-99)  mg/dL


 


POC Glucose (mg/dL)  121 H  (75-99)  mg/dL


 


Calcium   (8.4-10.2)  mg/dL


 


Phosphorus   (2.5-4.5)  mg/dL


 


Ferritin   (22.0-322.0)  ng/mL


 


Total Bilirubin   (0.2-1.3)  mg/dL


 


AST   (17-59)  U/L


 


ALT   (4-49)  U/L


 


Alkaline Phosphatase   ()  U/L


 


Lactate Dehydrogenase   (313-618)  U/L


 


Creatine Kinase   ()  U/L


 


C-Reactive Protein   (<1.0)  mg/dL


 


Total Protein   (6.3-8.2)  g/dL


 


Albumin   (3.5-5.0)  g/dL


 


Triglycerides   (<150)  mg/dL


 


Procalcitonin   (0.02-0.09)  ng/mL








                      Microbiology - Last 24 Hours (Table)











 04/19/21 10:13 Gram Stain - Preliminary





 Sputum Sputum Culture - Preliminary





    Presumptive Staph aureus


 


 04/19/21 09:42 Blood Culture Gram Stain - Preliminary





 Blood Blood Culture - Preliminary





    Staphylococcus aureus


 


 04/19/21 09:42 Blood Culture - Final





 Blood 














Assessment and Plan


Assessment: 


Severe sepsis, septic shock secondary to Covid pneumonia, beyond window for 

Remdesevir.  Possible PE, unstable for transfer for CTA, maintained on Lovenox.





Acute hypoxic respiratory failure secondary to the above, mechanical vent 

dependent





Bacteremia, staph aureus, final sensitivities pending





Worsening leukocytosis, suspect related to #1 and to bacteremia





Possible hypovolemic shock, pressor dependent





Acute renal failure, related to acute septic shock, ATN, emergent dialysis 

catheter placement pending





Critical hyperkalemia secondary to the above





Metabolic and respiratory acidosis, on bicarb drip





Gastroesophageal reflux disease





Chronic low back pain





Former nicotine dependence





Medical debility, critical illness polyneuropathy




















Plan: Continue on current medication regime ,monitoring and symptomatic 

treatment.emergent hemodialysis pending .bicarb drip. ICU management as per 

pulmonary/intensivist.Covid regimen. Prognosis guarded.

















The impression and plan of care has been dictated as directed.





:


I performed a history and examination of this patient,  discussed the same with 

the dictator.  I agree with the dictator's note ,documented as a scribe.  Any 

additional findings or plans will be noted.

## 2021-04-20 NOTE — XR
EXAMINATION TYPE: XR chest 1V portable

 

DATE OF EXAM: 4/20/2021

 

COMPARISON: Chest x-ray dated 4/20/2021 at earlier time

 

HISTORY: Endotracheal tube adjustment

 

TECHNIQUE: Single frontal view of the chest is obtained.

 

FINDINGS:  Endotracheal tube has been advanced slightly but is remaining at the level of the thoracic
 inlet. The entire chest is not included on exam. No other interval change is evident.

 

IMPRESSION:  Endotracheal tube has been advanced only minimally

## 2021-04-20 NOTE — P.PN
Subjective


Progress Note Date: 04/20/21


Principal diagnosis: 





Acute hypoxic respiratory failure secondary to COVID-19 pneumonia and ARDS





50-year-old male, with history of shortness of breath, who presents to the 

emergency department on March 31, a little after 9:00 PM.  He apparently was 

brought in by EMS.  I saw the patient in the emergency room, in room 33.  He was

on O2 several liters by nasal cannula and not receiving any IV fluids.  He has 

been sick for about 11 or 12 days.  He apparently just tested positive today.  

His symptoms included shortness of breath, weakness, fatigue, low saturations, 

and fever.  As an outpatient, he was on Tylenol, a Z-Alex, Claritin, Zofran, 

Flomax, and a Medrol Dosepak.  He does have ALLERGIES to penicillin antibiotics,

and sulfa antibiotics.  The patient was quite fatigued when I saw him in the 

emergency room.  He could barely open his eyes.  He was not demonstrating any s

igns or symptoms of respiratory compromise, and did not have any conversational 

dyspnea or accessory muscle use.  His white count was 4.2, hemoglobin 16, 

hematocrit 46.2, and platelet count 201,000.  PT/INR PTT were normal.  Sodium 

136, potassium 4.4, chlorides 104, CO2 24, anion gap 8, BUN 17, and creatinine 

0.94.  AST was 96, FiO2 104, LDH was 723, and C-reactive protein was 133.9.  

There was no d-dimer ordered.  A chest x-ray did reveal bilateral infiltrates.





The patient is seen today 04/02/2021 in follow-up on the regular medical floor. 

He is currently awake, alert, in no acute distress.  He is still requiring AirVo

high flow nasal cannula at 60 L and 90% FiO2 to maintain O2 saturations in the 

low 90s.  He is currently afebrile.  Hemodynamically stable.  Blood cultures 

reveal no growth.  He remains on Lovenox, Decadron, vitamin supplements. 





The patient is seen today 04/03/2021 in follow-up on the regular medical floor. 

He is currently resting on his right side in bed.  Still quite short of breath 

with minimal exertion.  Still requiring AirVo high flow oxygen at 60 L and 90% 

FiO2 to maintain O2 saturations in the 90s.  Chest x-ray continues to show 

persistent interstitial opacities improving and the left but now worsening on 

the right mid and lower lungs.  D-dimer 0.91.  .  C-reactive protein 2O2.

 He remains on dexamethasone, Lovenox, vitamin supplements.





The patient is seen today 04/04/2021 in follow-up in the intensive care unit.  

He was transferred here last evening after developing increasing shortness of 

breath and increasing oxygen requirements.  He is currently resting fairly 

comfortably in bed.  He remains on BiPAP 14/7 and 100% FiO2 to maintain O2 

saturations in the low 90s.  Chest x-ray continues to show persistent right 

greater than left diffuse interstitial opacities with interval decrease in the 

right lung base and mild increase on the left.  He did receive Tocilizumab and 

convalescent plasma yesterday.  He remains on Lovenox, IV Solu-Medrol, vitamin 

supplements.  White count 10.1.  Hemoglobin 15.5.  Lymphocytes 0.4.  D-dimer 

1.27.  Sodium 140.  Potassium 4.6.  Creatinine 0.82.  LDH 10,025.  C-reactive 

protein 171.





Progress note dated 04/05/2021.





The patient was admitted to the hospital on March 31.  He came to the ICU on 

April 3.  He is currently unfortunately on BiPAP at 14/7, and 100%.  He is 

getting saline at 75 mL an hour.  He is quite short of breath.  The patient did 

receive both convalescent plasma, and TOCI on April 3.  The patient doesn't feel

like he is getting better.  He doesn't feel any worse but just doesn't feel like

he is improving.  He does realize, that in the end, he may end up on the 

mechanical ventilator.  White count 14.7, hemoglobin 15.7, hematocrit 46.0, 

platelet count 321,000.  D-dimer is 1.45, and fibrinogen is 673.  Sodium 143, 

potassium 4.3, chlorides 109, CO2 26, anion gap 8, BUN 41, and creatinine 0.94. 

Most recent LDH is 1025.  The most recent C-reactive protein is 64.1.  Chest 

x-ray continues to show bilateral diffuse interstitial infiltrates.





Progress note dated 04/06/2021.





50-year-old male, admitted to the hospital on March 31.  He came in to the ICU 

on April 3.  Currently, the patient's on BiPAP with IPAP of 14, EPAP of 7.  FiO2

is 100%.  When he is not on BiPAP, the patient's on 15 L high flow nasal O2.  In

addition, when he is on the high flow nasal oxygen, he also has a nonrebreather 

mask on as well.  The patient's getting saline at 75 mL an hour.  White count 

14, hemoglobin 14.7, hematocrit 43.7, and platelet count 328,000.  D-dimer 1.76.

 Sodium 142, potassium 4.8, chlorides 112, CO2 26, anion gap 4, BUN and 

creatinine were 39 and 0.91.  C-reactive protein is down to 29.6.  Chest x-ray 

shows diffuse bilateral infiltrates.





The patient is seen today 04/07/2021 in follow-up in the intensive care unit.  

He is currently sitting up in bed.  Awake and alert.  Currently on 15 L high 

flow nasal cannula along with 100% nonrebreather mask.  He did not utilize the 

BiPAP last evening.  He has 0.9 normal saline at 75 ML's per hour.  Still 

dyspneic with minimal exertion.  Chest x-ray continues to show diffuse mixed 

interstitial and alveolar infiltrates.  Stable compared to previous.  Blood 

culture reveals no growth.  White count 13.6.  Hemoglobin 14.8.  Lymphocytes 

0.4.  Sodium 142.  Potassium 4.2.  Creatinine 0.86.  LDH 1127, C-reactive 

protein 17.6.  He remains on bronchodilators, IV Solu-Medrol, Lovenox, vitamin 

supplements.





The patient is seen today 04/08/2021 in follow-up in the intensive care unit.  

He is currently resting fairly comfortably in bed.  No significant events 

overnight.  He is maintaining O2 saturations in the upper 80s and low 90s on 15 

L high flow nasal cannula and addition to a nonrebreather mask.  0.9 normal 

saline at 75 mL per hour.  Chest x-ray shows no changes and bilateral 

infiltrates.  White count 13.3.  Hemoglobin 15.3.  Lymphocytes 0.4.  D-dimer 

3.36.  Sodium 138.  Potassium 4.3.  Creatinine 0.72.  C-reactive protein 14.4.  

He remains on Lovenox, IV Solu-Medrol, vitamin supplements.





The patient is seen today 04/09/2021 follow-up in the intensive care unit.  He 

is currently sitting up in bed.  Awake and alert.  In mild respiratory distress.

 Still quite dyspneic with minimal exertion.  Still with oxygen desaturations 

with minimal exertion.  He is currently on airflow high flow oxygen at 60 L/m 

and 90% FiO2 along with a nonrebreather mask to maintain O2 saturations 85-86%. 

Morning blood gases revealed a PaO2 of 49, pCO2 30 and a pH of 7.48.  0.9 normal

sinus 75 ML's per hour.  He has received a unit of convalescent plasma.  Blood 

cultures reveal no growth.  White count 12.2.  Hemoglobin 15.2.  Lymphocytes 

0.3.  D-dimer 2.84.  Sodium 138.  Potassium 4.3.  Creatinine 0.69.  LDH 1196.  

C-reactive protein 10.4.  Remains on IV Solu-Medrol, rhonchi dilators, Lovenox, 

vitamin supplements.





The patient is seen today 04/10/2021 follow-up in the intensive care unit.  He 

is currently sitting up in bed.  Awake and alert in no acute distress.  Still 

quite dyspneic with minimal exertion in conversation.  He remains on AirVo high 

flow oxygen at 60 L and 95% FiO2 along with a nonrebreather mask with O2 satura

tions in the mid 80s.  He did receive convalescent plasma and tocilizumab back 

on 04/03/2021.  He has a 0.9 normal saline at 75 mL per hour.  Yesterday's blood

gases revealed a pO2 of 49, pCO2 of 30 and a pH of 7.5.  Chest x-ray revealing 

slight improvement of bibasilar infiltrates.  Blood cultures reveal no growth.  

White count 12.7.  Hemoglobin 15.1.  D-dimer 2.47.  Sodium 135.  Potassium 4.3. 

Creatinine 0.74.  LDH 1213.  C-reactive protein 8.6.  Continued on Lovenox, IV 

Solu-Medrol, vitamin supplements.





The patient is seen today 04/11/2021 in follow-up in the intensive care unit.  

He remains sitting up in bed.  Awake and alert.  He is continued on AirVo high 

flow oxygen at 60 L and 95% FiO2 along with a nonrebreather mask.  O2 

saturations in the 80s.  He is 0.9 normal saline at 75 ML's per hour.  He 

received convalescent plasma 1.  Blood cultures reveal no growth.  White count 

11.1.  Hemoglobin 15.3.  Lymphocytes 0.4.  Sodium 134.  Potassium 4.4.  

Creatinine 0.71.  LDH 1172.  C-reactive protein 7.5.  He remains on Lovenox, 

Solu-Medrol, vitamin supplements. 





On 04/12/2021, I'm seeing this patient in follow-up.  The patient was Hospital 

as forCOVID 19  pneumonia and the patient progressive hypoxic respiratory 

failure.  The patient was initially admitted on 04/04/2021 and the patient is 

currently in the intensive care unit requiring high flow oxygen.  He is 

currently on high flow oxygen at 60 L with an FiO2 of 95% in addition to a 

nonrebreather facemask.  He is awake and alert.  He is short of breath with 

limited amount of activity and he easily desaturates.  No significant chest 

pain.  Cough with deep breathing.  The patient has already received convalescent

plasma.  The patient is currently on IV Solu-Medrol 60 mg every 6 hours of this 

in addition to multivitamins. He was also treated with Tocilizumab.  LDH level 

from yesterday was 1157 and the CRP was 8.1.  Chest x-ray still unchanged with 

diffuse bilateral pulmonary infiltrates.  This patient otherwise has been in 

good state of health.  No chronic illnesses otherwise.  He is being monitored 

very closely here in the intensive care unit due to concerns of progressive 

respiratory failure requiring intubation mechanical ventilation.  Blood cultures

of been negative thus far.





On 04/13/2021, the patient is being seen for a follow-up.  This is a case of 

Coumadin related pneumonia with hypoxic respiratory failure.  The patient was on

high flow oxygen yesterday and he was on 60 L with an FiO2 of 95%.  He was also 

using the nonrebreather facemask in addition to the high flow oxygen.  Was doing

some activity and movement yesterday, the patient desaturated down to the 60s 

and he was hard to recovered.  At this time, the patient became quite restless 

and agitated.  He was placed on a BiPAP briefly which made him more panicky 

uncomfortable.  Overnight, he was also started on Precedex which is currently 

running at 0.2 mcg/kg per minute.  Ultimately, he was taken off the BiPAP and 

the patient is currently back on high flow oxygen at 60 L with a 93% in addition

to 100% nonrebreather facemask.  His current pulse ox is order of 76-82 percent.

 The chest x-ray from today is showing bilateral lower lobe pulmonary 

infiltrates and compared to yesterday's chest x-ray, there is no major interval 

change.  Findings and essentially stable.  Meanwhile, his blood work from today 

is showing a white cell count of 15.8, he does have lymphopenia, renal function 

is stable, LDH level is 1-15 and the CRP is 7.4.  I also check a pro-calcitonin 

level on him yesterday and the level came back low at 0.08.  Meanwhile, the 

patient is currently on Solu-Medrol 60 mg every 6 hours he is also on Lovenox 40

mg subcu every 24 hours.  The patient remains on Precedex for now.  His calm and

comfortable and responsive.





04/14/2021, the patient is having difficulty with hypoxemia and worsening 

shortness of breath.  Note that after being on high flow oxygen for quite some 

time, the patient started showing some signs of decompensated yesterday.  His 

pulse ox was dropping in the mid and low 80s and he was very slow in recovering.

 Based on that, he was switched to a BiPAP which is currently running at a BiPAP

pressure of 14/5 cm of water with an FiO2 of 100%.  Despite all this, he remains

tachypneic.  The patient prior volume is above 1 L and his respiratory rate is 

in the mid 40s and is generating a very high risk ventilation even without the 

BiPAP.  He is quite tachypneic.  Pulse ox currently is in the low 70s.  He is on

Precedex.  He is very anxious.  His panicky.  He is restless. Precedex is 

running at micrograms per kilogram per minute.  As far as his treatment, the 

patient remains on IV Solu Medrol 60 mg every 6 hours.  He is also on Lovenox 40

mg subcu every 24 hours.  Chest x-ray from today is showing stable bilateral 

pulmonary infiltrates, essentially unchanged compared to yesterday.  Doppler of 

the lower extremities was done yesterday showed no evidence of DVT.  His 

inflammatory markers today is showing a d-dimer of 4.1 from yesterday.  LDH is 

01/04/2009, higher, CRP is 5.7, able.  Rest of the electrodes are stable, BUN is

40 with a creatinine of 0.8.  His white cell count is currently at 20 and 

hemoglobin is at 16.  His net fluid balance has been -2.6 L and the patient was 

given Lasix yesterday and the patient was Negative Fluid Balance.





04/15/2021 the patient is being seen for a follow-up.  He obviously not 

intubated yesterday as the patient calmed down considerably with utilization of 

Precedex and morphine.  Nevertheless, his sister status remains borderline and 

currently is still on a BiPAP at a pressure of 16/12 and FiO2 of 100% and the 

patient is also on Precedex and points Precedex at 0.7 mcg/kg per minute and 

morphine as needed.  He is not was essentially uneventful.  His current pulse ox

is around 89%.  The easily desaturates.  He had several events yesterday with hi

s pulse ox dropped to lower levels and is taking them quite some time until he 

recovers probably in the order of 30 minutes to 45 minutes.  During this time 

the patient becomes quite panicky restless and short of breath.  We have still 

avoided intubation on this patient and were continuing the supportive care with 

noninvasive positive pressure ventilation.  The chest x-ray remains stable 

without any interval change and there is some lower lobe at the pulmonary 

infiltrates.  The patient's white cell count is 18.4 with a hemoglobin of 16.8. 

His electrolytes are normal, renal function is showing a stable creatinine of 

0.9, rest of the inflammatory markers are still pending for now.  Fluid balance 

has been in the order of -2.6 L for yesterday and the patient is headed towards 

more negative fluid balance for today.  The patient remains on IV Solu-Medrol 60

mg every 6 hours.  He is on Lovenox therapeutic dose of 100 mg by mouth every 12

hours therapeutic dose as well as utilized as the patient was getting 

progressively more hypoxic and pulmonary embolism was a constellation.  The 

patient was unable to undergo a CT angiogram because of his very limited 

pulmonary reserve and borderline respiratory status.  Doppler of the lower 

extremities were obviously negative.  No signs of bleeding and was going to 

continue the therapy goes of Lovenox for another 24 hours.  D-dimer from today 

is pending for now.  Clinically, he is resting comfortably in bed.  His night 

was otherwise uneventful.  A Devlin catheter was also inserted yesterday is 

limited his mobility.





04/16/2021, the patient remains on a BiPAP of 16/12 cm of water with an FiO2 of 

100%.  His current respiratory rate is in the low 30s.  He seems to be 

comfortable, he had very much dependent on a BiPAP and the patient has limited 

reserve and easily desaturates.  His been on BiPAP for the past 3 days for now. 

This is making his nose irritated in the mouth dry.  The chest x-ray from today 

still pending.  Yesterday's chest x-ray was reviewed and the findings were 

essentially stable.  To control his increased level of anxiety and synchrony 

with the noninvasive positive pressure ventilator, and without Precedex which is

currently running at 0.7 mcg/kg per minute and this has done significant 

improvement in terms of lowering his respiratory rate and taking control of his 

anxiety.  He is on TPN for nutritional support for now.  He has a PICC line in 

his left upper extremity.  In terms of therapy, he remains on Lovenox 1 mg every

every 12 hours.  He remains on IV Solu Medrol 60 mg IV every 6 hours.  

Inflammatory markers on today's evaluation showed a d-dimer of 1.77, his LDH 

level is 1371 and his CRP level is at 6.3.  His electrolytes are all within 

normal limits.  His fluid balance over the past 24 hours is negative for 54 mL 

and the patient was being diuresed with Lasix.  He does not have a lazy 

extending dose for now.  He was also given Diflucan regarding the possibility of

some oropharyngeal candidiasis.  IV fluids currently running at 20 mL an hour.  

TPN is currently running at 30 mL an hour and the patient currently has a Devlin 

catheter in place.





04/17/2021, the patient is still on BiPAP.  He was on BiPAP overnight and is 

getting quite anxious and tired of being on the BiPAP.  Current BiPAP settings 

of 16/12 an FiO2 of 100%.  Current pulse ox is ranging between 84 and 87%.  He 

wants to try to high flow oxygen system again.  His white cell count is at 31.  

His d-dimer is at 1.5.  His LDH level is 1533 and his CRP level is at 0.5.  His 

chest x-ray from today is showing infiltrates in lung bases bilaterally, 

essentially unchanged compared to the yesterday's chest film.  I was told by the

nursing staff that even while receiving oral care.  He is still on Precedex at 

0.7 mcg/kg per minute.  He remains on IV Solu Medrol 60 mg every 6 hours.  I 

will today's condition essentially unchanged..  Have some oropharyngeal 

candidiasis for which she was started on Diflucan.  He remains on TPN for nutri

tional support which is running at 30 mL an hour.  He is awake and alert and is,

indicating.  Altered mentation.  Found the week.  Quite discouraged because of 

his ongoing respiratory failure.  Unable to use a incentive spirometer as the 

patient is currently strictly on BiPAP.  He is not tachycardic.  His current 

respiratory rate is in the mid 20s.  He is able to generate higher tidal volumes

and his minute ventilation continues to be quite elevated at 30





04/18/2021, the patient remains unchanged and this is quite discouraging as the 

patient is not doing any significant progress.  He remains very much BiPAP 

dependent.  Yesterday with ecchymosis the BiPAP for 5 minutes and he 

decompensated with his pulse ox dropped down to the low 60s and he became 

extremely tachypneic.  Even now, with movement and limited activity, the patient

desaturates and is taken in quite some time to recover.  His chest x-ray still 

showing bilateral lower lobe pulmonary infiltrates consistent with COVID-19 

related pneumonia/ARDS.  The patient remains on BiPAP this morning at a pressure

of 16/12 with an FiO2 of 100%.  LDH level remains elevated at 1486 and the CRP 

level is down to 0.7.  The d-dimer currently is at 1.6.  It is of treatment, the

patient is on IV Solu Medrol 60 mg every 6 hours.  He remains on Precedex at 0 

point have and microvascular kilogram per minute.  Precedex has made a much more

comfortable and less agitated.  He is awake.  Is arousable.  He cannot eat.  He 

is on TPN for nutritional support.  He was started on Diflucan for some 

oropharyngeal candidiasis.  TPN is running at the rate of 85 mL an hour.  In 

terms of his blood work and labs, white cell count was 25 and a platelet count 

was 198 slightly lower, his electrolytes are all within normal limits.  Total 

protein is down to 5 with albumin level of 2.6.  His triglyceride level is at 

379.  He is weak.  He is in bed most of the time.  Unable to stop on a chair.  

As mentioned, he carries a high risk of failing respiratory status requiring 

intubation mechanical ventilation.





04/19/2021, the patient is being seen in the intensive care unit.  This is a 

case of COVID-19 related pneumonia and ARDS.  At around midnight and on, the 

patient progressively become more hypoxic and his pulse ox dropped in the low 

70s.  At that point, the decision was to proceed with intubation mechanical 

ventilation.  Note that the patient was on BiPAP for several days and he was 

still hanging on with a pulse ox in the mid 80s and low 90s.  Nevertheless, 

overnight, he decompensated and he became more agitated tachycardic restless and

hypotensive.  As such, the decision was to proceed with intubation mechanical 

ventilation.  The patient is currently intubated.  Post intubation, we had 

significant difficulties with respiratory acidosis, hypotension, a synchrony 

with the mechanical ventilator and ongoing hypoxemia.  As such, the patient was 

immediately sedated and paralyzed.  Currently propofol is running at 75 mcg/kg 

per minute and fentanyl is running at 0.5 mcg/kg per minute.  Patient is also 

paralyzed with Nimbex at 2 mcg/kg per minute.  The patient is currently 

mechanically ventilated.  I did a lot of vent changes throughout the night and 

the most current vent setting includes an assist-control of 30 with an FiO2 of 

100% and a PEEP of 20 with a tidal volume of 350.  The patient's peak airway 

patient is 40.  Static pressure is 39.  The patient's chest x-ray showing 

extensive consolidation of the lower lobes more so on the right and as such I 

suspect a superimposed infection the right lower lobe.  Hemodynamically, the pat

ient received IV fluids and he received a total of 2 L and currently IV fluids 

running at 0.9 at 150 mL an hour.  I made recommendations to stop the TPN for 

now.  Meanwhile, the patient will be also covered with broad-spectrum 

antibiotics with a combination of cefepime, vancomycin and Eraxis.  Diflucan was

be discontinued.  Cultures will be sent.  The patient is oliguric and his skin 

is getting mottled and he is becoming more cyanotic.  In terms of labs, the most

recent blood gases available prior to the vent setting change includes a pH of 

7.06 with a pCO2 of 100 and pO2 of 58.  His blood work from this morning shows a

BUN of 32 with a creatinine of 0.7.  His white cell count is up to 55.3 with a 

hemoglobin of 16.7.  His LDH is up to 2094 and the CRP is at 3.6.  LFTs are also

on the rise.  Rest of the electrolytes are all within normal limits.  The 

patient is currently sedated and paralyzed, he is hemodynamically unstable in 

follow-up.   He is also on pressors and norepinephrine infusion is running at 

0.1 mcg/kg per minute.  Follow-up blood gases are pending for now.  





Patient was reevaluated today on 4/19/2021, patient was already seen by Dr. Cote, apparently around midnight the patient had a downhill course, became 

more hypoxic, and his O2 saturation dropped to the 70s.  Patient was intubated, 

he was initially on BiPAP, and now he is intubated and mechanically ventilated. 

Patient is now on assist control rate of 40, volume is 375 FiO2 100% PEEP of 20.

 ABG post intubation showed a pO2 of 70 pCO2 of 101 pH of 7.06.  Patient is on 

Nimbex, fentanyl, norepinephrine at 0.1, and propofol at 75.  Chest x-ray 

continues to show significant bilateral infiltrates worse on the right side.  

Considering his leukocytosis, patient was placed on vancomycin, cefepime, and on

Eraxis.  We change his TPN to enteral feeding.  And I plan to have a repeat ABG 

in 2 hours.  His IV fluid is down to 100 mL per hour, and I recommended a 1 L of

fluid boluses for low blood pressure in spite of norepinephrine.





Patient was reevaluated today on 4/20/2021, patient continues to do poorly t

jessica.  Remains intubated and mechanically ventilated.  He is on assist control 

rate of 40 tidal volume is 430 FiO2 on the percent PEEP of 20.  His peak airway 

pressure is very high, his static pressure is also high, hence I recommended 

switching the patient to a pressure control mode of mechanical ventilation, 

pressure control of 20, TI of 0.70, respiratory rate of 40, FiO2 100  percent, 

and PEEP of 18.  ABG today on earlier vent setting showed a pO2 of 72, pCO2 of 

98 pH of 6.96, adjustments were made with tidal volume earlier, but he was 

already on the rate of 40.  Nephrology was consulted, patient will be undergoing

dialysis as his potassium seems to be rising, earlier potassium was 7.1, bicarb 

was 22 BUN 47 creatinine 3.59.  Patient clearly has both respiratory and 

metabolic acidosis causing hyperkalemia.  His LDH today is 2300, CPK is 264, and

C-reactive protein is 7.3 chest x-ray continues to show bilateral patchy 

infiltrates, right more so than left patient remains on D5W with bicarbonate 

drip.  He is also on propofol, fentanyl, norepinephrine, and vasopressin.  He is

also on tube feeding vital Hb at 20 mL per hour.  Patient remains on vancomycin,

cefepime, and Eraxis.  Blood culture is positive for staph aureus, final 

sensitivity is pending, it is not clear whether this is MRSA or MSSA yet.  

Patient is on Vanco covering the finding.














Objective





- Vital Signs


Vital signs: 


                                   Vital Signs











Temp  98.8 F   04/20/21 12:00


 


Pulse  120 H  04/20/21 12:00


 


Resp  40 H  04/20/21 12:00


 


BP  139/87   04/19/21 11:00


 


Pulse Ox  90 L  04/20/21 12:00








                                 Intake & Output











 04/19/21 04/20/21 04/20/21





 18:59 06:59 18:59


 


Intake Total 8021.213 4310.613 1825.048


 


Output Total 210 23 5


 


Balance 7811.213 4287.613 1820.048


 


Weight 98.8 kg 103.5 kg 


 


Intake:   


 


  IV 6086 1439 515


 


    Calcium Gluconate 2 gm In  100 





    Sodium Chloride 0.9% 100   





    ml @ 100 mls/hr IVPB   





    ONCE ONE Rx#:[id number]   


 


    Dextrose 5% in Water 1,  200 500





    000 ml @ 100 mls/hr IV .   





    N87Q50M SHAZIA with Sodium   





    Bicarb (1 Meq/ml) 150 ml   





    Rx#:[id number]   


 


    Sodium Chloride 0.9% 1, 1050 1100 





    000 ml @ 100 mls/hr IV .   





    Q10H SHAZIA Rx#:[id number]   


 


    Sodium Chloride 0.9% 1, 5000  





    000 ml @ 999 mls/hr IV .   





    Q1H1M ONE Rx#:[id number]   


 


    pressure bag 36 39 15


 


  Intake, IV Titration 5577.826 7535.613 1190.048





  Amount   


 


    Cisatracurium 200 mg In 120.338 162.822 





    Sodium Chloride 0.9% 180   





    ml @ 1 MCG/KG/MIN 5.928   





    mls/hr IV .Q24H SHAZIA Rx#:   





    [id number]   


 


    Norepinephrine 4 mg In 422.247 0960.078 1012.325





    Sodium Chloride 0.9% 250   





    ml @ 0.05 MCG/KG/MIN 18.   





    821 mls/hr IV .[id number]   





    SHAZIA Rx#:[id number]   


 


    Sodium Acetate 30 meq 1023  





    Potassium Phosphate 9   





    mmol Calcium Gluconate 0.   





    5 gm In Amino Acids 5 %/   





    Dextrose 20 % 1,000 ml @   





    85 mls/hr IV .BY DURATION   





    SHAZIA Rx#:[id number]   


 


    fentaNYL (PF). 1,000 mcg  66.031 77.723





    In Sodium Chloride 0.9%   





    80 ml @ Per Protocol IV .   





    Q0M SHAZIA Rx#:[id number]   


 


    propofoL 1,000 mg In 477.875 449.682 100





    Empty Bag 1 bag @ Titrate   





    IV .Q0M SHAZIA Rx#:   





    [id number]   


 


  Tube Feeding 30 210 120


 


  Other 30 90 


 


Output:   


 


  Urine 210 23 5


 


Other:   


 


  Voiding Method Indwelling Catheter Indwelling Catheter Indwelling Catheter








                       ABP, PAP, CO, CI - Last Documented











Arterial Blood Pressure        107/55

















- Exam








GENERAL EXAM: Revealed 50-year-old white male intubated and mechanically 

ventilated, sedated and paralyzed.


HEAD: Normocephalic.


EYES: Normal reaction of pupils, equal size.


NOSE: Clear with pink turbinates.


THROAT: No erythema or exudates.


NECK: No masses, no JVD.


CHEST: No chest wall deformity.


LUNGS: Symmetrical expansion crackles and rhonchi noted bilaterally.


CVS: Tachycardic, normal S1 and S2  no audible murmur, regular rhythm.


ABDOMEN: Soft nontender no megaly no rebound no guarding.


SKIN: No rashes


CENTRAL NERVOUS SYSTEM:  Sedated and paralyzed, cannot be assessed.


EXTREMITIES: There is no peripheral edema.  No clubbing, no cyanosis ,  

diminished pulses bilaterally





- Labs


CBC & Chem 7: 


                                 04/20/21 03:40





                                 04/20/21 08:31


Labs: 


                  Abnormal Lab Results - Last 24 Hours (Table)











  04/19/21 04/19/21 04/19/21 Range/Units





  11:44 18:22 23:56 


 


WBC     (3.8-10.6)  k/uL


 


MCHC     (31.0-37.0)  g/dL


 


Neutrophils #     (1.3-7.7)  k/uL


 


Lymphocytes #     (1.0-4.8)  k/uL


 


Monocytes #     (0-1.0)  k/uL


 


D-Dimer     (<0.60)  mg/L FEU


 


ABG pH     (7.35-7.45)  


 


ABG pCO2     (35-45)  mmHg


 


ABG pO2     ()  mmHg


 


ABG Total CO2     (19-24)  mmol/L


 


ABG O2 Saturation     (94-97)  %


 


Potassium     (3.5-5.1)  mmol/L


 


Chloride     ()  mmol/L


 


BUN     (9-20)  mg/dL


 


Creatinine     (0.66-1.25)  mg/dL


 


Glucose     (74-99)  mg/dL


 


POC Glucose (mg/dL)   106 H  105 H  (75-99)  mg/dL


 


Calcium     (8.4-10.2)  mg/dL


 


Phosphorus     (2.5-4.5)  mg/dL


 


Ferritin     (22.0-322.0)  ng/mL


 


Total Bilirubin     (0.2-1.3)  mg/dL


 


AST     (17-59)  U/L


 


ALT     (4-49)  U/L


 


Alkaline Phosphatase     ()  U/L


 


Lactate Dehydrogenase     (313-618)  U/L


 


Creatine Kinase     ()  U/L


 


C-Reactive Protein     (<1.0)  mg/dL


 


Total Protein     (6.3-8.2)  g/dL


 


Albumin     (3.5-5.0)  g/dL


 


Triglycerides     (<150)  mg/dL


 


Procalcitonin  3.62 H    (0.02-0.09)  ng/mL














  04/20/21 04/20/21 04/20/21 Range/Units





  03:40 03:40 03:40 


 


WBC  52.4 H*    (3.8-10.6)  k/uL


 


MCHC  30.8 L    (31.0-37.0)  g/dL


 


Neutrophils #  50.2 H    (1.3-7.7)  k/uL


 


Lymphocytes #  0.4 L    (1.0-4.8)  k/uL


 


Monocytes #  1.4 H    (0-1.0)  k/uL


 


D-Dimer    3.51 H  (<0.60)  mg/L FEU


 


ABG pH     (7.35-7.45)  


 


ABG pCO2     (35-45)  mmHg


 


ABG pO2     ()  mmHg


 


ABG Total CO2     (19-24)  mmol/L


 


ABG O2 Saturation     (94-97)  %


 


Potassium     (3.5-5.1)  mmol/L


 


Chloride     ()  mmol/L


 


BUN     (9-20)  mg/dL


 


Creatinine     (0.66-1.25)  mg/dL


 


Glucose     (74-99)  mg/dL


 


POC Glucose (mg/dL)     (75-99)  mg/dL


 


Calcium     (8.4-10.2)  mg/dL


 


Phosphorus     (2.5-4.5)  mg/dL


 


Ferritin     (22.0-322.0)  ng/mL


 


Total Bilirubin     (0.2-1.3)  mg/dL


 


AST     (17-59)  U/L


 


ALT     (4-49)  U/L


 


Alkaline Phosphatase     ()  U/L


 


Lactate Dehydrogenase     (313-618)  U/L


 


Creatine Kinase     ()  U/L


 


C-Reactive Protein     (<1.0)  mg/dL


 


Total Protein     (6.3-8.2)  g/dL


 


Albumin     (3.5-5.0)  g/dL


 


Triglycerides   992 H   (<150)  mg/dL


 


Procalcitonin     (0.02-0.09)  ng/mL














  04/20/21 04/20/21 04/20/21 Range/Units





  04:30 05:25 08:31 


 


WBC     (3.8-10.6)  k/uL


 


MCHC     (31.0-37.0)  g/dL


 


Neutrophils #     (1.3-7.7)  k/uL


 


Lymphocytes #     (1.0-4.8)  k/uL


 


Monocytes #     (0-1.0)  k/uL


 


D-Dimer     (<0.60)  mg/L FEU


 


ABG pH   6.96 L*   (7.35-7.45)  


 


ABG pCO2   98 H*   (35-45)  mmHg


 


ABG pO2   72 L   ()  mmHg


 


ABG Total CO2   25 H   (19-24)  mmol/L


 


ABG O2 Saturation   92.9 L   (94-97)  %


 


Potassium  7.1 H*   6.1 H*  (3.5-5.1)  mmol/L


 


Chloride  110 H    ()  mmol/L


 


BUN  47 H    (9-20)  mg/dL


 


Creatinine  3.59 H    (0.66-1.25)  mg/dL


 


Glucose  102 H    (74-99)  mg/dL


 


POC Glucose (mg/dL)     (75-99)  mg/dL


 


Calcium  6.5 L    (8.4-10.2)  mg/dL


 


Phosphorus  8.0 H    (2.5-4.5)  mg/dL


 


Ferritin  2817.8 H    (22.0-322.0)  ng/mL


 


Total Bilirubin  1.7 H    (0.2-1.3)  mg/dL


 


AST  147 H    (17-59)  U/L


 


ALT  288 H    (4-49)  U/L


 


Alkaline Phosphatase  156 H    ()  U/L


 


Lactate Dehydrogenase  2300 H    (313-618)  U/L


 


Creatine Kinase  264 H    ()  U/L


 


C-Reactive Protein  7.3 H    (<1.0)  mg/dL


 


Total Protein  4.3 L    (6.3-8.2)  g/dL


 


Albumin  2.0 L    (3.5-5.0)  g/dL


 


Triglycerides     (<150)  mg/dL


 


Procalcitonin     (0.02-0.09)  ng/mL














  04/20/21 Range/Units





  12:24 


 


WBC   (3.8-10.6)  k/uL


 


MCHC   (31.0-37.0)  g/dL


 


Neutrophils #   (1.3-7.7)  k/uL


 


Lymphocytes #   (1.0-4.8)  k/uL


 


Monocytes #   (0-1.0)  k/uL


 


D-Dimer   (<0.60)  mg/L FEU


 


ABG pH   (7.35-7.45)  


 


ABG pCO2   (35-45)  mmHg


 


ABG pO2   ()  mmHg


 


ABG Total CO2   (19-24)  mmol/L


 


ABG O2 Saturation   (94-97)  %


 


Potassium   (3.5-5.1)  mmol/L


 


Chloride   ()  mmol/L


 


BUN   (9-20)  mg/dL


 


Creatinine   (0.66-1.25)  mg/dL


 


Glucose   (74-99)  mg/dL


 


POC Glucose (mg/dL)  121 H  (75-99)  mg/dL


 


Calcium   (8.4-10.2)  mg/dL


 


Phosphorus   (2.5-4.5)  mg/dL


 


Ferritin   (22.0-322.0)  ng/mL


 


Total Bilirubin   (0.2-1.3)  mg/dL


 


AST   (17-59)  U/L


 


ALT   (4-49)  U/L


 


Alkaline Phosphatase   ()  U/L


 


Lactate Dehydrogenase   (313-618)  U/L


 


Creatine Kinase   ()  U/L


 


C-Reactive Protein   (<1.0)  mg/dL


 


Total Protein   (6.3-8.2)  g/dL


 


Albumin   (3.5-5.0)  g/dL


 


Triglycerides   (<150)  mg/dL


 


Procalcitonin   (0.02-0.09)  ng/mL








                      Microbiology - Last 24 Hours (Table)











 04/19/21 09:42 Blood Culture Gram Stain - Preliminary





 Blood Blood Culture - Preliminary





    Staphylococcus aureus


 


 04/19/21 09:42 Blood Culture - Final





 Blood 


 


 04/19/21 10:13 Gram Stain - Preliminary





 Sputum Sputum Culture - Preliminary














Assessment and Plan


Assessment: 





Impression:


Acute hypoxic respiratory failure secondary to COVID-19 pneumonia and ARDS.


Acute septic shock is seriously considered, although hypovolemic shock is not 

entirely ruled out, but felt to be less likely.


Positive staph aureus bacteremia, final sensitivities pending.  Patient is on 

vancomycin.


Elevated inflammatory markers seconded to COVID-19 infection.


Worsening leukocytosis, suspect ongoing sepsis.  Most likely secondary to staph 

aureus bacteremia.


Oropharyngeal candidiasis.


Profound medical debility and suspect critical illness polyneuropathy.


Acute kidney injury, likely secondary to ATN likely secondary to acute septic 

shock patient is to be dialyzed today.


Recommendation:


Continue ventilatory support.  Patient is on pressure control mode of mechanical

ventilation.


Continue hemodynamic support.  Continue vasopressin and norepinephrine.


Continue Decadron.


Continue cefepime and vancomycin and axis.  Awaiting final sensitivity on the 

blood cultures.


Continue enteral feeding.


Adjust ventilator settings according to ABG.  Patient was placed on pressure 

control mode of mechanical ventilation today.


Prognosis remains extremely poor and guarded.


Continue sodium bicarb for his acidosis.


Patient received toci , and he received 1 unit of convalescent plasma.


Nephrology to start hemodialysis today.


Continue Decadron.


Continue GI and DVT prophylaxis.


Patient is extremely ill, and suspect poor prognosis.  Critical care time is 

over 30 minutes.


Time with Patient: Greater than 30

## 2021-04-20 NOTE — CONS
CONSULTATION



REASON FOR CONSULT:

Renal failure, hyperkalemia.



HISTORY OF PRESENT ILLNESS:

The patient is a 50-year-old male who was admitted to the hospital initially on

03/31/2021 with complaints of weakness, fever, shortness of breath and sore throat.  He

was Covid positive and had bilateral lung infiltrates.  The patient had been on BiPAP

on a regular floor for a long period of time until about _____.  He apparently

significantly deteriorated about 2 days ago and was brought into the ICU and eventually

intubated.  Post intubation, patient was noted to be significantly hypotensive and

required large doses of Levophed, currently maintained on about 50 mcg of Levophed.

His urine output has dropped significantly now only at about 0 to 5 mL an hour.  He had

been previously about 25-30.  The patient's serum potassium was elevated at 7.1 early

this morning.  Yesterday, he was at 4.4 mEq/L.  No active GI bleed noted at this time.

White cell count is up to 52.4, hemoglobin of 13.9.  The patient was acidotic earlier.

However, it was mostly respiratory acidosis and his CO2 on the labs was at 22 and it

was 25 on the blood gas.  The bicarb was 25 on the blood gas.  Currently patient is

maintained on vancomycin as he has evidence of Staph aureus bacteremia as well.  He is

maintained on steroids for the Covid pneumonia.  FiO2 is currently at about 100%.



PAST MEDICAL HISTORY:

Significant for pneumonia previously, gastroesophageal reflux disease, chronic back

pain.



PAST SURGICAL HISTORY:

Cholecystectomy.



SOCIAL HISTORY:

The patient is a former smoker.  No history of drug abuse or alcohol abuse.



MEDICATIONS:

Medications prior to admission included: Zithromax, Claritin, Zofran, Flomax, Medrol

Dosepak.



ALLERGIES:

INCLUDE AMOXICILLIN WHICH CAUSES RASH AND HIVES.  SULFA CAUSES ANAPHYLAXIS.



REVIEW OF SYSTEMS:

Negative for GI bleed.  The patient is on the vent, currently sedated.  FiO2 at 100%.

No significant urine output.



PHYSICAL EXAMINATION:

Patient is currently sedated on the vent. Blood pressure this morning 101/54, heart

rate 120 per minute, he is afebrile.

Examination shows no significant edema lower extremities.

CNS exam cannot be performed.

Abdomen is soft, nontender.

Heart and lungs are not examined.



LAB:

Show sodium 137 potassium 7.1, chloride 110, CO2 is 22, BUN 47, serum creatinine 3.59,

hemoglobin 13.9 g/dL.



ASSESSMENT:

1. Acute kidney injury, acute tubular necrosis currently oliguric with significant

    increase in creatinine to 3.59 from 0.79 yesterday.  Patient has been significantly

    hypotensive requiring large doses of pressors.  In view of persistent hyperkalemia

    and no urine output, we will proceed with renal replacement therapy and plan for

    dialysis today.

2. Hyperkalemia associated with acute kidney injury expect improvement post dialysis.

3. Severe respiratory acidosis, currently on the vent with vent changes.

4. Covid pneumonia.

5. Staph aureus bacteremia, maintained on vancomycin.

6. Covid pneumonia, status post Sulesomab and one unit of convalescence _____

    currently maintained on steroids.

7. Acute hypoxic respiratory failure currently on the vent.



PLAN:

1. Proceed with vascular surgery consult.

2. Dialysis catheter placement.

3. First treatment of hemodialysis today.

4. Change tube feeds to Nepro and avoid any nephrotoxic agents.

5. May continue with the saline for now.

6. Patient is also maintained on TPN.

7. Repeat labs in about 3 hours post dialysis.

8. Recommend change vancomycin to another antibiotic possibly daptomycin.

9. Recommend ID consult.

10.

Thank you for this consultation.  We will continue to follow the patient with you

during his hospitalization.





[email or web address] / IJN: [id number] / Job#: [id number] Nutrition Assessment   Assessment Type: Initial assessment  Reason for Visit: Registered Dietitian Evaluation  Referral Requested By: Rounds  Chart Medications Lab Results Reviewed: yes    Nutritional Risk Factors: High risk diagnosis    Current Diet Order: NPO  Diet Tolerance: npo  Food Allergies: no  Priority Points: Status 2-3    Demographic/Anthropometrics Information  Gender: male   Patient Age: 71 year old  Height: Height: 6' (182.9 cm)  Weight: Weight: 61.1 kg  BMI: Body mass index is 18.27 kg/m².    Physical Appearance: Other: n/a  Weight Classification: Underweight (BMI < 18.5)    Estimated Nutritional Needs  Assessment Weight: 61  Kg (covid positive 12/8)   Energy Needs: 25-35 kcal/kg  (8955-6202 kcal/day)  Protein Needs: 1.2-2.0 g/kg      ( grams/day)  Fluid Needs: Fluids per MD     Nutrition Diagnosis (PES)  Inadequate oral intake related to Intubation/ Respiratory status as evidenced by Need for NPO status    Nutrition Plan  Recommended Nutrition Intervention: Coordination of nutrition care by a nutrition professional  Monitor: Biochemical data, medical tests, procedures    Discharge Needs: Pending  Care Plan Discussed With: Pt unable to participate in plan of care  Goals: Transition to enteral nutrition  Goal Progress: Initiated  Timeframe to Achieve Goal: 1-3 days    Dietitian Notes/Impressions/Recommendations:  1/5/21: Pt admitted from nursing home with lethargy, hypoxemia, sepsis, failure to thrive. History of Afib, HTN, kidney failure, CAD, recent positive Covid 12/8. Renal consulted for GLEN. Potential plan for pressors per rounds. Echo today. Not appropriate for BSE x2 days. Currently on BiPAP.   Weight Hx: No wt history per EMR.    NFPE: Deferred d/t current circumstances.       TREATMENT PLAN: Monitoring & Interventions   1. If TF to start: Vital AF 1.2 at 60ml/hr to meet 100% needs at 1728 calories, 108gm protein. Provides 1167ml free water.   2. FWF 150ml q 4 with IVF off or per Renal.  Total free water: 2067ml.   3. RD to follow for GOC, ability to feed.        Malnutrition Status: deferred.

## 2021-04-21 LAB
ALBUMIN SERPL-MCNC: 1.9 G/DL (ref 3.5–5)
ALP SERPL-CCNC: 155 U/L (ref 38–126)
ALT SERPL-CCNC: 187 U/L (ref 4–49)
ANION GAP SERPL CALC-SCNC: 10 MMOL/L
AST SERPL-CCNC: 81 U/L (ref 17–59)
BASOPHILS # BLD AUTO: 0.1 K/UL (ref 0–0.2)
BASOPHILS NFR BLD AUTO: 0 %
BUN SERPL-SCNC: 44 MG/DL (ref 9–20)
CALCIUM SPEC-MCNC: 6.2 MG/DL (ref 8.4–10.2)
CHLORIDE SERPL-SCNC: 106 MMOL/L (ref 98–107)
CK SERPL-CCNC: 345 U/L (ref 55–170)
CO2 BLDA-SCNC: 24 MMOL/L (ref 19–24)
CO2 SERPL-SCNC: 20 MMOL/L (ref 22–30)
EOSINOPHIL # BLD AUTO: 0.4 K/UL (ref 0–0.7)
EOSINOPHIL NFR BLD AUTO: 1 %
ERYTHROCYTE [DISTWIDTH] IN BLOOD BY AUTOMATED COUNT: 4.05 M/UL (ref 4.3–5.9)
ERYTHROCYTE [DISTWIDTH] IN BLOOD: 13.3 % (ref 11.5–15.5)
FERRITIN SERPL-MCNC: 1379.4 NG/ML (ref 22–322)
GLUCOSE BLD-MCNC: 123 MG/DL (ref 75–99)
GLUCOSE BLD-MCNC: 139 MG/DL (ref 75–99)
GLUCOSE BLD-MCNC: 82 MG/DL (ref 75–99)
GLUCOSE SERPL-MCNC: 133 MG/DL (ref 74–99)
HBV SURFACE AB SERPL IA-ACNC: 3.5 MIU/ML
HCO3 BLDA-SCNC: 22 MMOL/L (ref 21–25)
HCT VFR BLD AUTO: 36.4 % (ref 39–53)
HGB BLD-MCNC: 12.7 GM/DL (ref 13–17.5)
LDH SPEC-CCNC: 1645 U/L (ref 313–618)
LYMPHOCYTES # SPEC AUTO: 0.4 K/UL (ref 1–4.8)
LYMPHOCYTES NFR SPEC AUTO: 1 %
MAGNESIUM SPEC-SCNC: 1.5 MG/DL (ref 1.6–2.3)
MCH RBC QN AUTO: 31.5 PG (ref 25–35)
MCHC RBC AUTO-ENTMCNC: 35 G/DL (ref 31–37)
MCV RBC AUTO: 89.9 FL (ref 80–100)
MONOCYTES # BLD AUTO: 1.2 K/UL (ref 0–1)
MONOCYTES NFR BLD AUTO: 3 %
NEUTROPHILS # BLD AUTO: 37.9 K/UL (ref 1.3–7.7)
NEUTROPHILS NFR BLD AUTO: 94 %
PCO2 BLDA: 75 MMHG (ref 35–45)
PH BLDA: 7.08 [PH] (ref 7.35–7.45)
PLATELET # BLD AUTO: 114 K/UL (ref 150–450)
PO2 BLDA: 57 MMHG (ref 83–108)
POTASSIUM SERPL-SCNC: 5.2 MMOL/L (ref 3.5–5.1)
PROT SERPL-MCNC: 4.1 G/DL (ref 6.3–8.2)
SODIUM SERPL-SCNC: 136 MMOL/L (ref 137–145)
WBC # BLD AUTO: 40.3 K/UL (ref 3.8–10.6)

## 2021-04-21 RX ADMIN — CEFAZOLIN SCH MLS/HR: 330 INJECTION, POWDER, FOR SOLUTION INTRAMUSCULAR; INTRAVENOUS at 04:26

## 2021-04-21 RX ADMIN — ANIDULAFUNGIN SCH: 100 INJECTION, POWDER, LYOPHILIZED, FOR SOLUTION INTRAVENOUS at 11:32

## 2021-04-21 RX ADMIN — DEXTRAN 70 AND HYPROMELLOSE 2910 SCH DROPS: 1; 3 SOLUTION/ DROPS OPHTHALMIC at 00:18

## 2021-04-21 RX ADMIN — OXYCODONE HYDROCHLORIDE AND ACETAMINOPHEN SCH: 500 TABLET ORAL at 09:07

## 2021-04-21 RX ADMIN — DEXTRAN 70 AND HYPROMELLOSE 2910 SCH DROPS: 1; 3 SOLUTION/ DROPS OPHTHALMIC at 20:19

## 2021-04-21 RX ADMIN — DEXTRAN 70 AND HYPROMELLOSE 2910 SCH DROPS: 1; 3 SOLUTION/ DROPS OPHTHALMIC at 04:25

## 2021-04-21 RX ADMIN — DILTIAZEM HYDROCHLORIDE SCH: 5 INJECTION INTRAVENOUS at 18:44

## 2021-04-21 RX ADMIN — BACITRACIN ZINC AND POLYMYXIN B SULFATE SCH APPLIC: 500; 10000 OINTMENT TOPICAL at 20:19

## 2021-04-21 RX ADMIN — DEXTRAN 70 AND HYPROMELLOSE 2910 SCH: 1; 3 SOLUTION/ DROPS OPHTHALMIC at 17:50

## 2021-04-21 RX ADMIN — INSULIN ASPART SCH: 100 INJECTION, SOLUTION INTRAVENOUS; SUBCUTANEOUS at 13:46

## 2021-04-21 RX ADMIN — SODIUM CHLORIDE SCH MLS/HR: 900 INJECTION, SOLUTION INTRAVENOUS at 23:06

## 2021-04-21 RX ADMIN — INSULIN ASPART SCH UNIT: 100 INJECTION, SOLUTION INTRAVENOUS; SUBCUTANEOUS at 06:38

## 2021-04-21 RX ADMIN — NOREPINEPHRINE BITARTRATE SCH: 1 INJECTION, SOLUTION, CONCENTRATE INTRAVENOUS at 16:31

## 2021-04-21 RX ADMIN — TAMSULOSIN HYDROCHLORIDE SCH: 0.4 CAPSULE ORAL at 09:08

## 2021-04-21 RX ADMIN — ENOXAPARIN SODIUM SCH MG: 30 INJECTION SUBCUTANEOUS at 09:25

## 2021-04-21 RX ADMIN — DEXTRAN 70 AND HYPROMELLOSE 2910 SCH: 1; 3 SOLUTION/ DROPS OPHTHALMIC at 11:32

## 2021-04-21 RX ADMIN — CHLORHEXIDINE GLUCONATE SCH ML: 1.2 RINSE ORAL at 09:25

## 2021-04-21 RX ADMIN — DILTIAZEM HYDROCHLORIDE SCH MLS/HR: 5 INJECTION INTRAVENOUS at 06:39

## 2021-04-21 RX ADMIN — ALBUTEROL SULFATE PRN PUFF: 90 AEROSOL, METERED RESPIRATORY (INHALATION) at 22:12

## 2021-04-21 RX ADMIN — Medication SCH: at 09:07

## 2021-04-21 RX ADMIN — BACITRACIN ZINC AND POLYMYXIN B SULFATE SCH APPLIC: 500; 10000 OINTMENT TOPICAL at 09:26

## 2021-04-21 RX ADMIN — INSULIN ASPART SCH: 100 INJECTION, SOLUTION INTRAVENOUS; SUBCUTANEOUS at 00:24

## 2021-04-21 RX ADMIN — SODIUM CHLORIDE SCH MLS/HR: 900 INJECTION, SOLUTION INTRAVENOUS at 11:49

## 2021-04-21 RX ADMIN — Medication SCH: at 09:08

## 2021-04-21 RX ADMIN — NOREPINEPHRINE BITARTRATE SCH MLS/HR: 1 INJECTION, SOLUTION, CONCENTRATE INTRAVENOUS at 20:17

## 2021-04-21 RX ADMIN — CISATRACURIUM BESYLATE SCH MLS/HR: 10 INJECTION INTRAVENOUS at 20:18

## 2021-04-21 RX ADMIN — CEFEPIME HYDROCHLORIDE SCH: 1 INJECTION, POWDER, FOR SOLUTION INTRAMUSCULAR; INTRAVENOUS at 18:45

## 2021-04-21 RX ADMIN — INSULIN ASPART SCH: 100 INJECTION, SOLUTION INTRAVENOUS; SUBCUTANEOUS at 18:45

## 2021-04-21 RX ADMIN — CHLORHEXIDINE GLUCONATE SCH ML: 1.2 RINSE ORAL at 20:30

## 2021-04-21 RX ADMIN — AMIODARONE HYDROCHLORIDE SCH: 50 INJECTION, SOLUTION INTRAVENOUS at 17:49

## 2021-04-21 RX ADMIN — CEFAZOLIN SCH MLS/HR: 330 INJECTION, POWDER, FOR SOLUTION INTRAMUSCULAR; INTRAVENOUS at 20:05

## 2021-04-21 RX ADMIN — CEFEPIME HYDROCHLORIDE SCH MLS/HR: 1 INJECTION, POWDER, FOR SOLUTION INTRAMUSCULAR; INTRAVENOUS at 06:54

## 2021-04-21 RX ADMIN — CEFAZOLIN SCH: 330 INJECTION, POWDER, FOR SOLUTION INTRAMUSCULAR; INTRAVENOUS at 08:27

## 2021-04-21 RX ADMIN — CEFAZOLIN SCH MLS/HR: 330 INJECTION, POWDER, FOR SOLUTION INTRAMUSCULAR; INTRAVENOUS at 21:42

## 2021-04-21 RX ADMIN — DEXTROSE SCH MG: 50 INJECTION, SOLUTION INTRAVENOUS at 09:25

## 2021-04-21 RX ADMIN — PANTOPRAZOLE SODIUM SCH MG: 40 INJECTION, POWDER, FOR SOLUTION INTRAVENOUS at 09:26

## 2021-04-21 NOTE — P.PN
Subjective


Progress Note Date: 04/21/21


Principal diagnosis: 





Acute hypoxic respiratory failure secondary to COVID-19 pneumonia and ARDS





50-year-old male, with history of shortness of breath, who presents to the 

emergency department on March 31, a little after 9:00 PM.  He apparently was 

brought in by EMS.  I saw the patient in the emergency room, in room 33.  He was

on O2 several liters by nasal cannula and not receiving any IV fluids.  He has 

been sick for about 11 or 12 days.  He apparently just tested positive today.  

His symptoms included shortness of breath, weakness, fatigue, low saturations, 

and fever.  As an outpatient, he was on Tylenol, a Z-Alex, Claritin, Zofran, 

Flomax, and a Medrol Dosepak.  He does have ALLERGIES to penicillin antibiotics,

and sulfa antibiotics.  The patient was quite fatigued when I saw him in the 

emergency room.  He could barely open his eyes.  He was not demonstrating any s

igns or symptoms of respiratory compromise, and did not have any conversational 

dyspnea or accessory muscle use.  His white count was 4.2, hemoglobin 16, 

hematocrit 46.2, and platelet count 201,000.  PT/INR PTT were normal.  Sodium 

136, potassium 4.4, chlorides 104, CO2 24, anion gap 8, BUN 17, and creatinine 

0.94.  AST was 96, FiO2 104, LDH was 723, and C-reactive protein was 133.9.  

There was no d-dimer ordered.  A chest x-ray did reveal bilateral infiltrates.





The patient is seen today 04/02/2021 in follow-up on the regular medical floor. 

He is currently awake, alert, in no acute distress.  He is still requiring AirVo

high flow nasal cannula at 60 L and 90% FiO2 to maintain O2 saturations in the 

low 90s.  He is currently afebrile.  Hemodynamically stable.  Blood cultures 

reveal no growth.  He remains on Lovenox, Decadron, vitamin supplements. 





The patient is seen today 04/03/2021 in follow-up on the regular medical floor. 

He is currently resting on his right side in bed.  Still quite short of breath 

with minimal exertion.  Still requiring AirVo high flow oxygen at 60 L and 90% 

FiO2 to maintain O2 saturations in the 90s.  Chest x-ray continues to show 

persistent interstitial opacities improving and the left but now worsening on 

the right mid and lower lungs.  D-dimer 0.91.  .  C-reactive protein 2O2.

 He remains on dexamethasone, Lovenox, vitamin supplements.





The patient is seen today 04/04/2021 in follow-up in the intensive care unit.  

He was transferred here last evening after developing increasing shortness of 

breath and increasing oxygen requirements.  He is currently resting fairly 

comfortably in bed.  He remains on BiPAP 14/7 and 100% FiO2 to maintain O2 

saturations in the low 90s.  Chest x-ray continues to show persistent right 

greater than left diffuse interstitial opacities with interval decrease in the 

right lung base and mild increase on the left.  He did receive Tocilizumab and 

convalescent plasma yesterday.  He remains on Lovenox, IV Solu-Medrol, vitamin 

supplements.  White count 10.1.  Hemoglobin 15.5.  Lymphocytes 0.4.  D-dimer 

1.27.  Sodium 140.  Potassium 4.6.  Creatinine 0.82.  LDH 10,025.  C-reactive 

protein 171.





Progress note dated 04/05/2021.





The patient was admitted to the hospital on March 31.  He came to the ICU on 

April 3.  He is currently unfortunately on BiPAP at 14/7, and 100%.  He is 

getting saline at 75 mL an hour.  He is quite short of breath.  The patient did 

receive both convalescent plasma, and TOCI on April 3.  The patient doesn't feel

like he is getting better.  He doesn't feel any worse but just doesn't feel like

he is improving.  He does realize, that in the end, he may end up on the 

mechanical ventilator.  White count 14.7, hemoglobin 15.7, hematocrit 46.0, 

platelet count 321,000.  D-dimer is 1.45, and fibrinogen is 673.  Sodium 143, 

potassium 4.3, chlorides 109, CO2 26, anion gap 8, BUN 41, and creatinine 0.94. 

Most recent LDH is 1025.  The most recent C-reactive protein is 64.1.  Chest 

x-ray continues to show bilateral diffuse interstitial infiltrates.





Progress note dated 04/06/2021.





50-year-old male, admitted to the hospital on March 31.  He came in to the ICU 

on April 3.  Currently, the patient's on BiPAP with IPAP of 14, EPAP of 7.  FiO2

is 100%.  When he is not on BiPAP, the patient's on 15 L high flow nasal O2.  In

addition, when he is on the high flow nasal oxygen, he also has a nonrebreather 

mask on as well.  The patient's getting saline at 75 mL an hour.  White count 

14, hemoglobin 14.7, hematocrit 43.7, and platelet count 328,000.  D-dimer 1.76.

 Sodium 142, potassium 4.8, chlorides 112, CO2 26, anion gap 4, BUN and 

creatinine were 39 and 0.91.  C-reactive protein is down to 29.6.  Chest x-ray 

shows diffuse bilateral infiltrates.





The patient is seen today 04/07/2021 in follow-up in the intensive care unit.  

He is currently sitting up in bed.  Awake and alert.  Currently on 15 L high 

flow nasal cannula along with 100% nonrebreather mask.  He did not utilize the 

BiPAP last evening.  He has 0.9 normal saline at 75 ML's per hour.  Still 

dyspneic with minimal exertion.  Chest x-ray continues to show diffuse mixed 

interstitial and alveolar infiltrates.  Stable compared to previous.  Blood 

culture reveals no growth.  White count 13.6.  Hemoglobin 14.8.  Lymphocytes 

0.4.  Sodium 142.  Potassium 4.2.  Creatinine 0.86.  LDH 1127, C-reactive 

protein 17.6.  He remains on bronchodilators, IV Solu-Medrol, Lovenox, vitamin 

supplements.





The patient is seen today 04/08/2021 in follow-up in the intensive care unit.  

He is currently resting fairly comfortably in bed.  No significant events 

overnight.  He is maintaining O2 saturations in the upper 80s and low 90s on 15 

L high flow nasal cannula and addition to a nonrebreather mask.  0.9 normal 

saline at 75 mL per hour.  Chest x-ray shows no changes and bilateral 

infiltrates.  White count 13.3.  Hemoglobin 15.3.  Lymphocytes 0.4.  D-dimer 

3.36.  Sodium 138.  Potassium 4.3.  Creatinine 0.72.  C-reactive protein 14.4.  

He remains on Lovenox, IV Solu-Medrol, vitamin supplements.





The patient is seen today 04/09/2021 follow-up in the intensive care unit.  He 

is currently sitting up in bed.  Awake and alert.  In mild respiratory distress.

 Still quite dyspneic with minimal exertion.  Still with oxygen desaturations 

with minimal exertion.  He is currently on airflow high flow oxygen at 60 L/m 

and 90% FiO2 along with a nonrebreather mask to maintain O2 saturations 85-86%. 

Morning blood gases revealed a PaO2 of 49, pCO2 30 and a pH of 7.48.  0.9 normal

sinus 75 ML's per hour.  He has received a unit of convalescent plasma.  Blood 

cultures reveal no growth.  White count 12.2.  Hemoglobin 15.2.  Lymphocytes 

0.3.  D-dimer 2.84.  Sodium 138.  Potassium 4.3.  Creatinine 0.69.  LDH 1196.  

C-reactive protein 10.4.  Remains on IV Solu-Medrol, rhonchi dilators, Lovenox, 

vitamin supplements.





The patient is seen today 04/10/2021 follow-up in the intensive care unit.  He 

is currently sitting up in bed.  Awake and alert in no acute distress.  Still 

quite dyspneic with minimal exertion in conversation.  He remains on AirVo high 

flow oxygen at 60 L and 95% FiO2 along with a nonrebreather mask with O2 satura

tions in the mid 80s.  He did receive convalescent plasma and tocilizumab back 

on 04/03/2021.  He has a 0.9 normal saline at 75 mL per hour.  Yesterday's blood

gases revealed a pO2 of 49, pCO2 of 30 and a pH of 7.5.  Chest x-ray revealing 

slight improvement of bibasilar infiltrates.  Blood cultures reveal no growth.  

White count 12.7.  Hemoglobin 15.1.  D-dimer 2.47.  Sodium 135.  Potassium 4.3. 

Creatinine 0.74.  LDH 1213.  C-reactive protein 8.6.  Continued on Lovenox, IV 

Solu-Medrol, vitamin supplements.





The patient is seen today 04/11/2021 in follow-up in the intensive care unit.  

He remains sitting up in bed.  Awake and alert.  He is continued on AirVo high 

flow oxygen at 60 L and 95% FiO2 along with a nonrebreather mask.  O2 

saturations in the 80s.  He is 0.9 normal saline at 75 ML's per hour.  He 

received convalescent plasma 1.  Blood cultures reveal no growth.  White count 

11.1.  Hemoglobin 15.3.  Lymphocytes 0.4.  Sodium 134.  Potassium 4.4.  

Creatinine 0.71.  LDH 1172.  C-reactive protein 7.5.  He remains on Lovenox, 

Solu-Medrol, vitamin supplements. 





On 04/12/2021, I'm seeing this patient in follow-up.  The patient was Hospital 

as forCOVID 19  pneumonia and the patient progressive hypoxic respiratory 

failure.  The patient was initially admitted on 04/04/2021 and the patient is 

currently in the intensive care unit requiring high flow oxygen.  He is 

currently on high flow oxygen at 60 L with an FiO2 of 95% in addition to a 

nonrebreather facemask.  He is awake and alert.  He is short of breath with 

limited amount of activity and he easily desaturates.  No significant chest 

pain.  Cough with deep breathing.  The patient has already received convalescent

plasma.  The patient is currently on IV Solu-Medrol 60 mg every 6 hours of this 

in addition to multivitamins. He was also treated with Tocilizumab.  LDH level 

from yesterday was 1157 and the CRP was 8.1.  Chest x-ray still unchanged with 

diffuse bilateral pulmonary infiltrates.  This patient otherwise has been in 

good state of health.  No chronic illnesses otherwise.  He is being monitored 

very closely here in the intensive care unit due to concerns of progressive 

respiratory failure requiring intubation mechanical ventilation.  Blood cultures

of been negative thus far.





On 04/13/2021, the patient is being seen for a follow-up.  This is a case of 

Coumadin related pneumonia with hypoxic respiratory failure.  The patient was on

high flow oxygen yesterday and he was on 60 L with an FiO2 of 95%.  He was also 

using the nonrebreather facemask in addition to the high flow oxygen.  Was doing

some activity and movement yesterday, the patient desaturated down to the 60s 

and he was hard to recovered.  At this time, the patient became quite restless 

and agitated.  He was placed on a BiPAP briefly which made him more panicky 

uncomfortable.  Overnight, he was also started on Precedex which is currently 

running at 0.2 mcg/kg per minute.  Ultimately, he was taken off the BiPAP and 

the patient is currently back on high flow oxygen at 60 L with a 93% in addition

to 100% nonrebreather facemask.  His current pulse ox is order of 76-82 percent.

 The chest x-ray from today is showing bilateral lower lobe pulmonary 

infiltrates and compared to yesterday's chest x-ray, there is no major interval 

change.  Findings and essentially stable.  Meanwhile, his blood work from today 

is showing a white cell count of 15.8, he does have lymphopenia, renal function 

is stable, LDH level is 1-15 and the CRP is 7.4.  I also check a pro-calcitonin 

level on him yesterday and the level came back low at 0.08.  Meanwhile, the 

patient is currently on Solu-Medrol 60 mg every 6 hours he is also on Lovenox 40

mg subcu every 24 hours.  The patient remains on Precedex for now.  His calm and

comfortable and responsive.





04/14/2021, the patient is having difficulty with hypoxemia and worsening 

shortness of breath.  Note that after being on high flow oxygen for quite some 

time, the patient started showing some signs of decompensated yesterday.  His 

pulse ox was dropping in the mid and low 80s and he was very slow in recovering.

 Based on that, he was switched to a BiPAP which is currently running at a BiPAP

pressure of 14/5 cm of water with an FiO2 of 100%.  Despite all this, he remains

tachypneic.  The patient prior volume is above 1 L and his respiratory rate is 

in the mid 40s and is generating a very high risk ventilation even without the 

BiPAP.  He is quite tachypneic.  Pulse ox currently is in the low 70s.  He is on

Precedex.  He is very anxious.  His panicky.  He is restless. Precedex is 

running at micrograms per kilogram per minute.  As far as his treatment, the 

patient remains on IV Solu Medrol 60 mg every 6 hours.  He is also on Lovenox 40

mg subcu every 24 hours.  Chest x-ray from today is showing stable bilateral 

pulmonary infiltrates, essentially unchanged compared to yesterday.  Doppler of 

the lower extremities was done yesterday showed no evidence of DVT.  His 

inflammatory markers today is showing a d-dimer of 4.1 from yesterday.  LDH is 

01/04/2009, higher, CRP is 5.7, able.  Rest of the electrodes are stable, BUN is

40 with a creatinine of 0.8.  His white cell count is currently at 20 and 

hemoglobin is at 16.  His net fluid balance has been -2.6 L and the patient was 

given Lasix yesterday and the patient was Negative Fluid Balance.





04/15/2021 the patient is being seen for a follow-up.  He obviously not 

intubated yesterday as the patient calmed down considerably with utilization of 

Precedex and morphine.  Nevertheless, his sister status remains borderline and 

currently is still on a BiPAP at a pressure of 16/12 and FiO2 of 100% and the 

patient is also on Precedex and points Precedex at 0.7 mcg/kg per minute and 

morphine as needed.  He is not was essentially uneventful.  His current pulse ox

is around 89%.  The easily desaturates.  He had several events yesterday with hi

s pulse ox dropped to lower levels and is taking them quite some time until he 

recovers probably in the order of 30 minutes to 45 minutes.  During this time 

the patient becomes quite panicky restless and short of breath.  We have still 

avoided intubation on this patient and were continuing the supportive care with 

noninvasive positive pressure ventilation.  The chest x-ray remains stable 

without any interval change and there is some lower lobe at the pulmonary 

infiltrates.  The patient's white cell count is 18.4 with a hemoglobin of 16.8. 

His electrolytes are normal, renal function is showing a stable creatinine of 

0.9, rest of the inflammatory markers are still pending for now.  Fluid balance 

has been in the order of -2.6 L for yesterday and the patient is headed towards 

more negative fluid balance for today.  The patient remains on IV Solu-Medrol 60

mg every 6 hours.  He is on Lovenox therapeutic dose of 100 mg by mouth every 12

hours therapeutic dose as well as utilized as the patient was getting 

progressively more hypoxic and pulmonary embolism was a constellation.  The 

patient was unable to undergo a CT angiogram because of his very limited 

pulmonary reserve and borderline respiratory status.  Doppler of the lower 

extremities were obviously negative.  No signs of bleeding and was going to 

continue the therapy goes of Lovenox for another 24 hours.  D-dimer from today 

is pending for now.  Clinically, he is resting comfortably in bed.  His night 

was otherwise uneventful.  A Devlin catheter was also inserted yesterday is 

limited his mobility.





04/16/2021, the patient remains on a BiPAP of 16/12 cm of water with an FiO2 of 

100%.  His current respiratory rate is in the low 30s.  He seems to be 

comfortable, he had very much dependent on a BiPAP and the patient has limited 

reserve and easily desaturates.  His been on BiPAP for the past 3 days for now. 

This is making his nose irritated in the mouth dry.  The chest x-ray from today 

still pending.  Yesterday's chest x-ray was reviewed and the findings were 

essentially stable.  To control his increased level of anxiety and synchrony 

with the noninvasive positive pressure ventilator, and without Precedex which is

currently running at 0.7 mcg/kg per minute and this has done significant 

improvement in terms of lowering his respiratory rate and taking control of his 

anxiety.  He is on TPN for nutritional support for now.  He has a PICC line in 

his left upper extremity.  In terms of therapy, he remains on Lovenox 1 mg every

every 12 hours.  He remains on IV Solu Medrol 60 mg IV every 6 hours.  

Inflammatory markers on today's evaluation showed a d-dimer of 1.77, his LDH 

level is 1371 and his CRP level is at 6.3.  His electrolytes are all within 

normal limits.  His fluid balance over the past 24 hours is negative for 54 mL 

and the patient was being diuresed with Lasix.  He does not have a lazy 

extending dose for now.  He was also given Diflucan regarding the possibility of

some oropharyngeal candidiasis.  IV fluids currently running at 20 mL an hour.  

TPN is currently running at 30 mL an hour and the patient currently has a Devlin 

catheter in place.





04/17/2021, the patient is still on BiPAP.  He was on BiPAP overnight and is 

getting quite anxious and tired of being on the BiPAP.  Current BiPAP settings 

of 16/12 an FiO2 of 100%.  Current pulse ox is ranging between 84 and 87%.  He 

wants to try to high flow oxygen system again.  His white cell count is at 31.  

His d-dimer is at 1.5.  His LDH level is 1533 and his CRP level is at 0.5.  His 

chest x-ray from today is showing infiltrates in lung bases bilaterally, 

essentially unchanged compared to the yesterday's chest film.  I was told by the

nursing staff that even while receiving oral care.  He is still on Precedex at 

0.7 mcg/kg per minute.  He remains on IV Solu Medrol 60 mg every 6 hours.  I 

will today's condition essentially unchanged..  Have some oropharyngeal 

candidiasis for which she was started on Diflucan.  He remains on TPN for nutri

tional support which is running at 30 mL an hour.  He is awake and alert and is,

indicating.  Altered mentation.  Found the week.  Quite discouraged because of 

his ongoing respiratory failure.  Unable to use a incentive spirometer as the 

patient is currently strictly on BiPAP.  He is not tachycardic.  His current 

respiratory rate is in the mid 20s.  He is able to generate higher tidal volumes

and his minute ventilation continues to be quite elevated at 30





04/18/2021, the patient remains unchanged and this is quite discouraging as the 

patient is not doing any significant progress.  He remains very much BiPAP 

dependent.  Yesterday with ecchymosis the BiPAP for 5 minutes and he 

decompensated with his pulse ox dropped down to the low 60s and he became 

extremely tachypneic.  Even now, with movement and limited activity, the patient

desaturates and is taken in quite some time to recover.  His chest x-ray still 

showing bilateral lower lobe pulmonary infiltrates consistent with COVID-19 

related pneumonia/ARDS.  The patient remains on BiPAP this morning at a pressure

of 16/12 with an FiO2 of 100%.  LDH level remains elevated at 1486 and the CRP 

level is down to 0.7.  The d-dimer currently is at 1.6.  It is of treatment, the

patient is on IV Solu Medrol 60 mg every 6 hours.  He remains on Precedex at 0 

point have and microvascular kilogram per minute.  Precedex has made a much more

comfortable and less agitated.  He is awake.  Is arousable.  He cannot eat.  He 

is on TPN for nutritional support.  He was started on Diflucan for some 

oropharyngeal candidiasis.  TPN is running at the rate of 85 mL an hour.  In 

terms of his blood work and labs, white cell count was 25 and a platelet count 

was 198 slightly lower, his electrolytes are all within normal limits.  Total 

protein is down to 5 with albumin level of 2.6.  His triglyceride level is at 

379.  He is weak.  He is in bed most of the time.  Unable to stop on a chair.  

As mentioned, he carries a high risk of failing respiratory status requiring 

intubation mechanical ventilation.





04/19/2021, the patient is being seen in the intensive care unit.  This is a 

case of COVID-19 related pneumonia and ARDS.  At around midnight and on, the 

patient progressively become more hypoxic and his pulse ox dropped in the low 

70s.  At that point, the decision was to proceed with intubation mechanical 

ventilation.  Note that the patient was on BiPAP for several days and he was 

still hanging on with a pulse ox in the mid 80s and low 90s.  Nevertheless, 

overnight, he decompensated and he became more agitated tachycardic restless and

hypotensive.  As such, the decision was to proceed with intubation mechanical 

ventilation.  The patient is currently intubated.  Post intubation, we had 

significant difficulties with respiratory acidosis, hypotension, a synchrony 

with the mechanical ventilator and ongoing hypoxemia.  As such, the patient was 

immediately sedated and paralyzed.  Currently propofol is running at 75 mcg/kg 

per minute and fentanyl is running at 0.5 mcg/kg per minute.  Patient is also 

paralyzed with Nimbex at 2 mcg/kg per minute.  The patient is currently 

mechanically ventilated.  I did a lot of vent changes throughout the night and 

the most current vent setting includes an assist-control of 30 with an FiO2 of 

100% and a PEEP of 20 with a tidal volume of 350.  The patient's peak airway 

patient is 40.  Static pressure is 39.  The patient's chest x-ray showing 

extensive consolidation of the lower lobes more so on the right and as such I 

suspect a superimposed infection the right lower lobe.  Hemodynamically, the pat

ient received IV fluids and he received a total of 2 L and currently IV fluids 

running at 0.9 at 150 mL an hour.  I made recommendations to stop the TPN for 

now.  Meanwhile, the patient will be also covered with broad-spectrum 

antibiotics with a combination of cefepime, vancomycin and Eraxis.  Diflucan was

be discontinued.  Cultures will be sent.  The patient is oliguric and his skin 

is getting mottled and he is becoming more cyanotic.  In terms of labs, the most

recent blood gases available prior to the vent setting change includes a pH of 

7.06 with a pCO2 of 100 and pO2 of 58.  His blood work from this morning shows a

BUN of 32 with a creatinine of 0.7.  His white cell count is up to 55.3 with a 

hemoglobin of 16.7.  His LDH is up to 2094 and the CRP is at 3.6.  LFTs are also

on the rise.  Rest of the electrolytes are all within normal limits.  The 

patient is currently sedated and paralyzed, he is hemodynamically unstable in 

follow-up.   He is also on pressors and norepinephrine infusion is running at 

0.1 mcg/kg per minute.  Follow-up blood gases are pending for now.  





Patient was reevaluated today on 4/19/2021, patient was already seen by Dr. Cote, apparently around midnight the patient had a downhill course, became 

more hypoxic, and his O2 saturation dropped to the 70s.  Patient was intubated, 

he was initially on BiPAP, and now he is intubated and mechanically ventilated. 

Patient is now on assist control rate of 40, volume is 375 FiO2 100% PEEP of 20.

 ABG post intubation showed a pO2 of 70 pCO2 of 101 pH of 7.06.  Patient is on 

Nimbex, fentanyl, norepinephrine at 0.1, and propofol at 75.  Chest x-ray 

continues to show significant bilateral infiltrates worse on the right side.  

Considering his leukocytosis, patient was placed on vancomycin, cefepime, and on

Eraxis.  We change his TPN to enteral feeding.  And I plan to have a repeat ABG 

in 2 hours.  His IV fluid is down to 100 mL per hour, and I recommended a 1 L of

fluid boluses for low blood pressure in spite of norepinephrine.





Patient was reevaluated today on 4/20/2021, patient continues to do poorly t

jessica.  Remains intubated and mechanically ventilated.  He is on assist control 

rate of 40 tidal volume is 430 FiO2 on the percent PEEP of 20.  His peak airway 

pressure is very high, his static pressure is also high, hence I recommended 

switching the patient to a pressure control mode of mechanical ventilation, 

pressure control of 20, TI of 0.70, respiratory rate of 40, FiO2 100  percent, 

and PEEP of 18.  ABG today on earlier vent setting showed a pO2 of 72, pCO2 of 

98 pH of 6.96, adjustments were made with tidal volume earlier, but he was 

already on the rate of 40.  Nephrology was consulted, patient will be undergoing

dialysis as his potassium seems to be rising, earlier potassium was 7.1, bicarb 

was 22 BUN 47 creatinine 3.59.  Patient clearly has both respiratory and 

metabolic acidosis causing hyperkalemia.  His LDH today is 2300, CPK is 264, and

C-reactive protein is 7.3 chest x-ray continues to show bilateral patchy 

infiltrates, right more so than left patient remains on D5W with bicarbonate 

drip.  He is also on propofol, fentanyl, norepinephrine, and vasopressin.  He is

also on tube feeding vital Hb at 20 mL per hour.  Patient remains on vancomycin,

cefepime, and Eraxis.  Blood culture is positive for staph aureus, final 

sensitivity is pending, it is not clear whether this is MRSA or MSSA yet.  

Patient is on Vanco covering the finding.





Patient was reevaluated today on 4/21/2021, remains in the ICU, intubated, 

mechanically ventilated, sedated, and paralyzed.  Patient is on pressure control

mode of mechanical ventilation with pressure control of 20, respiratory rate of 

40 FiO2 100% PEEP of 18,ti 0.75.  ABG showed a pO2 of 57 pCO2 of 75 pH of 7.08. 

Patient is on amiodarone, Cardizem, norepinephrine, vasopressin, he is also on 

fentanyl, Nimbex, and propofol.  Overnight the patient had intermittent episodes

of atrial fibrillation and RVR, patient remains on cefepime and vancomycin and 

the Eraxis, blood cultures have been positive for staph aureus.  Sputum culture 

also positive for staph aureus, suspect MSSA.  Patient is well covered for the 

time being.  Patient remains on enteral feeding/Nepro after evaluating the 

patient, I kept him basically on the same medications, however his ventilator 

settings was switched to assist control rate of 40 tidal volume is 400 FiO2 100%

and PEEP was increased to 20.  Family came in, and I had a long discussion with 

the wife regarding his condition.  Made the wife aware that the patient is not 

doing well, and his mortality is over 95% considering the patient has multiorgan

involvement, including lungs, liver, kidneys, and his cardiac status.  Yearly 

the patient is extremely ill, critically ill, and mortality is over 95%.  Family

would like everything to continue, however I was able to change the CODE STATUS 

to DO NOT RESUSCITATE.  Patient is now DO NOT RESUSCITATE CODE STATUS.  All labs

today were reviewed, BUN is 44 creatinine 4.9.  Liver enzymes are elevated.  LDH

is elevated at 1645.  C-reactive protein is 6.2 ALT is 81, alkaline phosphatase 

is 187.














Objective





- Vital Signs


Vital signs: 


                                   Vital Signs











Temp  98.4 F   04/21/21 04:00


 


Pulse  118 H  04/21/21 11:30


 


Resp  40 H  04/21/21 11:30


 


BP  113/66   04/21/21 07:00


 


Pulse Ox  67 L  04/21/21 11:30








                                 Intake & Output











 04/20/21 04/21/21 04/21/21





 18:59 06:59 18:59


 


Intake Total 3351.048 2479.096 406


 


Output Total 5 0 0


 


Balance 3346.048 2479.096 406


 


Weight 103.5 kg 112.4 kg 


 


Intake:   


 


  IV 1033 1136 206


 


    Dextrose 5% in Water 1, 1000 100 





    000 ml @ 100 mls/hr IV .   





    [id number] SHAZIA with Sodium   





    Bicarb (1 Meq/ml) 150 ml   





    Rx#:[id number]   


 


    Sodium Chloride 0.9% 1,  1000 200





    000 ml @ 100 mls/hr IV .   





    Q10H SHAZIA Rx#:[id number]   


 


    pressure bag 33 36 6


 


  Intake, IV Titration 1128.722 8667.096 200





  Amount   


 


    Cisatracurium 200 mg In  156.894 





    Sodium Chloride 0.9% 180   





    ml @ 1 MCG/KG/MIN 5.928   





    mls/hr IV .Q24H SHAZIA Rx#:   





    [id number]   


 


    Diltiazem 125 mg In  112.75 





    Sodium Chloride 0.9% 100   





    ml @ 10 MG/HR 10 mls/hr   





    IV .[id number] [facility] Rx#:   





    [id number]   


 


    Norepinephrine 32 mg In  188.240 





    Sodium Chloride 0.9% 218   





    ml @ 0.4 MCG/KG/MIN 19.   





    406 mls/hr IV .[id number]   





    SHAZIA Rx#:[id number]   


 


    Norepinephrine 4 mg In 1520.325 195.742 





    Sodium Chloride 0.9% 250   





    ml @ 0.05 MCG/KG/MIN 18.   





    821 mls/hr IV .[id number]   





    SHAZIA Rx#:[id number]   


 


    Sodium Chloride 0.9% 1, 100  





    000 ml @ 100 mls/hr IV .   





    Q10H SHAZIA Rx#:[id number]   


 


    fentaNYL (PF). 1,000 mcg 77.723  100





    In Sodium Chloride 0.9%   





    80 ml @ Per Protocol IV .   





    Q0M SHAZIA Rx#:[id number]   


 


    propofoL 1,000 mg In 300 399.470 100





    Empty Bag 1 bag @ Titrate   





    IV .Q0M SHAZIA Rx#:   





    [id number]   


 


  Tube Feeding 320 230 0


 


  Other  60 


 


Output:   


 


  Urine 5 0 0


 


  Hemodialysis 0  


 


Other:   


 


  Voiding Method Indwelling Catheter Indwelling Catheter Indwelling Catheter








                       ABP, PAP, CO, CI - Last Documented











Arterial Blood Pressure        45/36

















- Exam








GENERAL EXAM: Revealed 50-year-old white male intubated and mechanically 

ventilated, sedated and paralyzed.


HEAD: Normocephalic.


EYES: Normal reaction of pupils, equal size.


NOSE: Clear with pink turbinates.


THROAT: No erythema or exudates.


NECK: No masses, no JVD.


CHEST: No chest wall deformity.


LUNGS: Symmetrical expansion crackles and rhonchi noted bilaterally.


CVS: Tachycardic, normal S1 and S2  no audible murmur, regular rhythm.


ABDOMEN: Soft nontender no megaly no rebound no guarding.


SKIN: No rashes


CENTRAL NERVOUS SYSTEM:  Sedated and paralyzed, cannot be assessed.


EXTREMITIES: There is no peripheral edema.  No clubbing, no cyanosis ,  

diminished pulses bilaterally





- Labs


CBC & Chem 7: 


                                 04/21/21 04:45





                                 04/21/21 05:00


Labs: 


                  Abnormal Lab Results - Last 24 Hours (Table)











  04/20/21 04/20/21 04/20/21 Range/Units





  12:24 18:04 23:50 


 


WBC     (3.8-10.6)  k/uL


 


RBC     (4.30-5.90)  m/uL


 


Hgb     (13.0-17.5)  gm/dL


 


Hct     (39.0-53.0)  %


 


Plt Count     (150-450)  k/uL


 


Neutrophils #     (1.3-7.7)  k/uL


 


Lymphocytes #     (1.0-4.8)  k/uL


 


Monocytes #     (0-1.0)  k/uL


 


ABG pH     (7.35-7.45)  


 


ABG pCO2     (35-45)  mmHg


 


ABG pO2     ()  mmHg


 


ABG O2 Saturation     (94-97)  %


 


Sodium     (137-145)  mmol/L


 


Potassium     (3.5-5.1)  mmol/L


 


Carbon Dioxide     (22-30)  mmol/L


 


BUN     (9-20)  mg/dL


 


Creatinine     (0.66-1.25)  mg/dL


 


Glucose     (74-99)  mg/dL


 


POC Glucose (mg/dL)  121 H  144 H  144 H  (75-99)  mg/dL


 


Calcium     (8.4-10.2)  mg/dL


 


Phosphorus     (2.5-4.5)  mg/dL


 


Magnesium     (1.6-2.3)  mg/dL


 


AST     (17-59)  U/L


 


ALT     (4-49)  U/L


 


Alkaline Phosphatase     ()  U/L


 


Lactate Dehydrogenase     (313-618)  U/L


 


Creatine Kinase     ()  U/L


 


C-Reactive Protein     (<1.0)  mg/dL


 


Total Protein     (6.3-8.2)  g/dL


 


Albumin     (3.5-5.0)  g/dL


 


Random Vancomycin     ug/mL














  04/21/21 04/21/21 04/21/21 Range/Units





  00:24 04:45 04:45 


 


WBC   40.3 H   (3.8-10.6)  k/uL


 


RBC   4.05 L   (4.30-5.90)  m/uL


 


Hgb   12.7 L   (13.0-17.5)  gm/dL


 


Hct   36.4 L   (39.0-53.0)  %


 


Plt Count   114 L   (150-450)  k/uL


 


Neutrophils #   37.9 H   (1.3-7.7)  k/uL


 


Lymphocytes #   0.4 L   (1.0-4.8)  k/uL


 


Monocytes #   1.2 H   (0-1.0)  k/uL


 


ABG pH     (7.35-7.45)  


 


ABG pCO2     (35-45)  mmHg


 


ABG pO2     ()  mmHg


 


ABG O2 Saturation     (94-97)  %


 


Sodium     (137-145)  mmol/L


 


Potassium     (3.5-5.1)  mmol/L


 


Carbon Dioxide     (22-30)  mmol/L


 


BUN     (9-20)  mg/dL


 


Creatinine     (0.66-1.25)  mg/dL


 


Glucose     (74-99)  mg/dL


 


POC Glucose (mg/dL)  123 H    (75-99)  mg/dL


 


Calcium     (8.4-10.2)  mg/dL


 


Phosphorus     (2.5-4.5)  mg/dL


 


Magnesium     (1.6-2.3)  mg/dL


 


AST     (17-59)  U/L


 


ALT     (4-49)  U/L


 


Alkaline Phosphatase     ()  U/L


 


Lactate Dehydrogenase     (313-618)  U/L


 


Creatine Kinase     ()  U/L


 


C-Reactive Protein     (<1.0)  mg/dL


 


Total Protein     (6.3-8.2)  g/dL


 


Albumin     (3.5-5.0)  g/dL


 


Random Vancomycin    42.5 H*  ug/mL














  04/21/21 04/21/21 04/21/21 Range/Units





  05:00 05:40 06:30 


 


WBC     (3.8-10.6)  k/uL


 


RBC     (4.30-5.90)  m/uL


 


Hgb     (13.0-17.5)  gm/dL


 


Hct     (39.0-53.0)  %


 


Plt Count     (150-450)  k/uL


 


Neutrophils #     (1.3-7.7)  k/uL


 


Lymphocytes #     (1.0-4.8)  k/uL


 


Monocytes #     (0-1.0)  k/uL


 


ABG pH   7.08 L*   (7.35-7.45)  


 


ABG pCO2   75 H*   (35-45)  mmHg


 


ABG pO2   57 L*   ()  mmHg


 


ABG O2 Saturation   86.8 L   (94-97)  %


 


Sodium  136 L    (137-145)  mmol/L


 


Potassium  5.2 H    (3.5-5.1)  mmol/L


 


Carbon Dioxide  20 L    (22-30)  mmol/L


 


BUN  44 H    (9-20)  mg/dL


 


Creatinine  4.90 H    (0.66-1.25)  mg/dL


 


Glucose  133 H    (74-99)  mg/dL


 


POC Glucose (mg/dL)    139 H  (75-99)  mg/dL


 


Calcium  6.2 L*    (8.4-10.2)  mg/dL


 


Phosphorus  6.9 H    (2.5-4.5)  mg/dL


 


Magnesium  1.5 L    (1.6-2.3)  mg/dL


 


AST  81 H    (17-59)  U/L


 


ALT  187 H    (4-49)  U/L


 


Alkaline Phosphatase  155 H    ()  U/L


 


Lactate Dehydrogenase  1645 H    (313-618)  U/L


 


Creatine Kinase  345 H    ()  U/L


 


C-Reactive Protein  6.2 H    (<1.0)  mg/dL


 


Total Protein  4.1 L    (6.3-8.2)  g/dL


 


Albumin  1.9 L    (3.5-5.0)  g/dL


 


Random Vancomycin     ug/mL








                      Microbiology - Last 24 Hours (Table)











 04/19/21 10:13 Gram Stain - Final





 Sputum Sputum Culture - Final





    Staphylococcus aureus


 


 04/19/21 09:42 Blood Culture Gram Stain - Preliminary





 Blood Blood Culture - Preliminary





    Staphylococcus aureus














Assessment and Plan


Assessment: 





Impression:


Acute hypoxic respiratory failure secondary to COVID-19 pneumonia and ARDS.


Acute septic shock is seriously considered, although hypovolemic shock is not 

entirely ruled out, but felt to be less likely.


Positive staph aureus bacteremia, final sensitivities pending.  Patient is on 

vancomycin.


Elevated inflammatory markers seconded to COVID-19 infection.


Multisystem organ failure secondary to COVID-19 infection pneumonia and possibly

underlying MSSA pneumonia and MSSA sepsis.  With MSSA bacteremia


Worsening leukocytosis, suspect ongoing sepsis.  Most likely secondary to staph 

aureus bacteremia.


Oropharyngeal candidiasis.


Profound medical debility and suspect critical illness polyneuropathy.


Acute kidney injury, likely secondary to ATN likely secondary to acute septic 

shock patient is to be dialyzed today.





Recommendation:


Updated the wife on his condition, and I had a long discussion with the wife at 

bedside.  CODE STATUS changed to DO NOT RESUSCITATE, however wife is not ready 

to consider comfort care measures at this point.


Continue ventilatory support.  Change pressure controlled volume control


Continue hemodynamic support.  Continue vasopressin and norepinephrine.


Continue Decadron.


Continue cefepime and vancomycin and eraxis.  Sputum showed MSSA, blood cultures

are showing staph aureus, final sensitivity is pending.


Continue enteral feeding.


Prognosis remains extremely poor and guarded.  Wife was updated


Continue sodium bicarb for his acidosis.


Patient received toci , and he received 1 unit of convalescent plasma.


Resume hemodialysis.


Continue GI and DVT prophylaxis.


Patient is extremely ill, and suspect poor prognosis.  Critical care time is 

over 30 minutes.


Time with Patient: Greater than 30

## 2021-04-21 NOTE — PN
PROGRESS NOTE



The patient is seen for followup for acute kidney injury. The patient is currently on

the vent.  FiO2 remains at 100%, PEEP of 18.  He is significantly hypotensive requiring

max doses of Levophed and vaso.  Patient had about 2 hours of dialysis yesterday.  He

subsequently developed atrial fibrillation and RVR.  His heart rate was in the 160s.

The patient was started on Cardizem drip.  Again, he did have about 2 hours of

treatment and potassium had improved from 7.1 to about 5.2 today.



Overnight, the patient has become more hypotensive requiring more pressors.



PHYSICAL EXAMINATION:

On examination today, blood pressure 80/54, heart rate 160 per minute. He is afebrile.

ABDOMEN:  Soft, obese.

Examination of lower extremities shows edema trace bilaterally.

CNS exam cannot be performed.



LABS:

Labs show sodium 136, potassium 5.2, chloride 106, CO2 is 20, BUN 44, creatinine 4.9.



ASSESSMENT:

1. Acute kidney injury, acute tubular necrosis, oliguric secondary to hypotension,

    underlying COVID infection, started on dialysis yesterday. No plans on dialysis

    today given the seem significant hypotension and hemodynamic instability with rapid

    atrial fibrillation.

2. Hyperkalemia associated with acute kidney injury, improved post dialysis yesterday.

3. Acute hypoxic respiratory failure secondary to COVID pneumonia.

4. COVID pneumonia currently on the vent.



PLAN:

No plans for dialysis today.  Overall prognosis is guarded.





MMODL / IJN: [id number] / Job#: [id number]

## 2021-04-21 NOTE — XR
EXAMINATION TYPE: XR chest 1V portable

 

DATE OF EXAM: 4/21/2021

 

COMPARISON: Right chest x-ray 4/20/2021

 

HISTORY: Intubated

 

TECHNIQUE: Single frontal view of the chest is obtained.

 

FINDINGS:  Endotracheal tube is overlying the thoracic inlet at the tracheal air column. Orogastric t
ube is in place. Pleural-parenchymal changes are stable. Left-sided PICC line is unchanged.

 

IMPRESSION:  Findings consistent with pneumonia. Endotracheal tube at thoracic inlet.

## 2021-04-21 NOTE — P.CRDCN
History of Present Illness


History of present illness: 





HISTORY OF PRESENTING ILLNESS


Patient is a pleasant 50 year old male with history of allergies, former tobacco

and COVID 19 infection who initially presented 3/31 with worsening SOB, fatigue 

cough, fever.  He was found to have COVID 19 pneumonia and has had lengthy 

hospitalization, currently on ventilator and most of history is obtained from 

chart as he is nonresponsive on vent.  Cardiology was consulted for AFib with 

RVR.  He had a femoral guillermo placed and dialysis started yesterday due to 

worsened LETY.  He went into new Afib with RVR with HR's up to 170-180's with 

decrease in his BP.  He already has been on pressors and is currently on 

Vasopressin, high dose of Levophed with systolics in the 70-80's.  He was placed

on Amio drip, Cardizem drip with HR's still in the 110-120's.  








REVIEW OF SYSTEMS


At the time of my exam:


Unable to obtain secondary to being on ventilator.





PHYSICAL EXAMINATION


Vitals reviewed


CONSTITUTIONAL: Ill appearing, non responsive on ventilator, 1+ anasarca


HEENT: Head is normocephalic. Pupils are equal, round. Mucous membranes of the 

mouth are dry.  +ETT, No carotid bruit.


CHEST EXAMINATION: Coarse BS bilaterally


HEART EXAMINATION: Regular rate and rhythm. S1, S2 heard. No murmurs, gallops or

rub.


ABDOMEN: Soft, Positive bowel sounds.


EXTREMITIES: 2+ peripheral pulses, +1+ LE edema


NEUROLOGIC EXAMINATION: Non responsive, sedated on vent 





ASSESSMENT


Acute hypoxic respiratory failure related to COVID 19 pneumonia/ ARDS


Septic shock


Staph aureus bacteremia


LETY


Paroxysmal Afib with RVR


Former tobacco abuse


Acidosis


Hyperkalemia


Elevated liver enzymes





PLAN


Patient appears to be declining and prognosis is grave.  Continue Amiodarone, 

Cardizem drip as able with vasopressors.  Multiorgan failure from sepsis.  Check

Echo for completeness.  Patient has some thrombosytopenia and high bleeding risk

and we will monitor off of anticoagulation at this time.  





Past Medical History


Past Medical History: GERD/Reflux, Pneumonia


Additional Past Medical History / Comment(s): Pt tested covid+ on 21 [facility] 

ER.      Other hx; Bronchitis, chronic low back pain, urinary hesitency


History of Any Multi-Drug Resistant Organisms: None Reported


Past Surgical History: Cholecystectomy


Past Anesthesia/Blood Transfusion Reactions: No Reported Reaction


Past Psychological History: No Psychological Hx Reported


Smoking Status: Former smoker


Past Alcohol Use History: None Reported


Past Drug Use History: None Reported





- Past Family History


  ** Father


Family Medical History: No Reported History





  ** Mother


Family Medical History: Cancer


Additional Family Medical History / Comment(s): Mother had magdiel/ovarian and bone

cancer.  She is .





Medications and Allergies


                                Home Medications











 Medication  Instructions  Recorded  Confirmed  Type


 


Acetaminophen [Tylenol Arthritis] 650 mg PO Q4H PRN 21 History


 


Azithromycin [Zithromax Z-pack (6 See Taper PO AS DIRECTED 21 

History





tabs)]    


 


Loratadine [Claritin] 10 mg PO DAILY 21 History


 


Ondansetron HCl [Zofran] 4 mg PO Q8H PRN 21 History


 


Tamsulosin HCl [Flomax] 0.4 mg PO DAILY 21 History


 


methylPREDNISolone Dose Pack See Taper PO AS DIRECTED 21 History





[Medrol Dose Pack]    








                                    Allergies











Allergy/AdvReac Type Severity Reaction Status Date / Time


 


amoxicillin Allergy  Rash/Hives Verified 21 22:23


 


Sulfa (Sulfonamide AdvReac  Anaphylaxis Verified 21 22:23





Antibiotics)     














Physical Exam


Vitals: 


                                   Vital Signs











  Temp Pulse Pulse Resp BP BP Pulse Ox


 


 21 14:30   112 H   40 H    72 L


 


 21 14:15   112 H   40 H    71 L


 


 21 14:00   112 H   40 H    71 L


 


 21 13:45   114 H   40 H    72 L


 


 21 13:30   112 H   40 H    66 L


 


 21 13:15   115 H   40 H    71 L


 


 21 13:00   110 H   40 H    71 L


 


 21 12:45   109 H   40 H    72 L


 


 21 12:30   116 H   40 H    71 L


 


 21 12:15   118 H   40 H    71 L


 


 21 12:00   123 H   40 H    71 L


 


 21 11:45   122 H   40 H    72 L


 


 21 11:30   118 H   40 H    67 L


 


 21 11:15   123 H   40 H    73 L


 


 21 11:00   123 H   40 H    74 L


 


 21 10:45   123 H   40 H    75 L


 


 21 10:30   124 H   40 H    76 L


 


 21 10:15   126 H   40 H    77 L


 


 21 10:00   128 H   40 H    78 L


 


 21 09:45   128 H   40 H    82 L


 


 21 09:30   129 H   40 H    83 L


 


 21 09:15   154 H   40 H    81 L


 


 21 09:00   129 H   40 H    81 L


 


 21 08:45   135 H   40 H    80 L


 


 21 08:30   134 H   40 H    82 L


 


 21 08:15   161 H   40 H    81 L


 


 21 08:00   117 H   40 H    83 L


 


 21 07:45   126 H   40 H    81 L


 


 21 07:30   131 H   40 H    81 L


 


 21 07:15   92   40 H    80 L


 


 21 07:00   130 H    113/66   84 L


 


 21 06:45   138 H    101/62   84 L


 


 21 06:30   138 H    131/59   85 L


 


 21 06:15   94    117/55   85 L


 


 21 06:00   95   40 H  93/69   84 L


 


 21 05:45   147 H    100/63   83 L


 


 21 05:30   120 H    133/65   86 L


 


 21 05:15   86    120/52   86 L


 


 21 05:00   106 H   40 H  115/53   83 L


 


 21 04:45   124 H    112/51   83 L


 


 21 04:30   125 H    112/50   82 L


 


 21 04:15   124 H    112/50   83 L


 


 21 04:00  98.4 F  122 H  102 H  40 H  109/51   83 L


 


 21 03:45   116 H    100/59   84 L


 


 21 03:30   149 H    99/48   85 L


 


 21 03:15   123 H    97/48   82 L


 


 21 03:00   104 H   40 H    82 L


 


 21 02:45   105 H    108/52   85 L


 


 21 02:30   96    103/49   85 L


 


 21 02:15   103 H    106/50   86 L


 


 21 02:00   98   40 H  109/53   86 L


 


 21 01:45   96    111/57   88 L


 


 21 01:30   96    111/55   88 L


 


 21 01:15   97    106/54   88 L


 


 21 01:00   98   40 H  111/56   87 L


 


 21 00:45   95    108/54   87 L


 


 21 00:30   97    110/57   86 L


 


 21 00:15   98   40 H  111/55   86 L


 


 21 00:00  98.9 F  97  94  40 H  110/57   85 L


 


 21 23:45   98   40 H  120/58   84 L


 


 21 23:30   98   40 H  121/58  


 


 21 23:15     6 L    83 L


 


 21 23:00   101 H   18    89 L


 


 21 22:53   101 H   40 H    89 L


 


 21 22:45   101 H   40 H    90 L


 


 21 22:30   101 H   40 H    90 L


 


 21 22:15   101 H   40 H    90 L


 


 21 22:00   101 H   40 H  139/87   90 L


 


 21 21:45   104 H   40 H  139/87   90 L


 


 21 21:30   105 H   40 H    90 L


 


 21 21:15   104 H   40 H    89 L


 


 21 21:00   103 H   40 H    88 L


 


 21 20:45   104 H   40 H    89 L


 


 21 20:30   104 H   40 H    89 L


 


 21 20:15   103 H   40 H    89 L


 


 21 20:00  100.4 F H  103 H  102 H  40 H    88 L


 


 21 19:45   103 H   40 H    88 L


 


 21 19:30   102 H   40 H    88 L


 


 21 19:00   102 H   40 H    87 L


 


 21 18:45   103 H   41 H    89 L


 


 21 18:30   103 H   40 H    89 L


 


 21 18:15   103 H   40 H    90 L


 


 21 18:00   102 H   40 H    89 L


 


 21 17:45   140 H   40 H    89 L


 


 21 17:30   140 H   40 H    89 L


 


 21 17:15   151 H   40 H    87 L


 


 21 17:00   138 H   40 H    87 L


 


 21 16:45   135 H   40 H    86 L


 


 21 16:30   124 H   40 H    91 L


 


 21 16:15   113 H   40 H    91 L


 


 21 16:00  98.9 F  151 H   40 H  139/87   89 L


 


 21 15:45   163 H   40 H    89 L


 


 21 15:30   114 H   40 H    91 L


 


 21 15:16  98.8 F   158 H  40 H   129/64 


 


 21 15:15   146 H   40 H    90 L


 


 21 15:00   176 H   40 H    93 L


 


 21 14:45   184 H   40 H    93 L








                                Intake and Output











 21





 22:59 06:59 14:59


 


Intake Total 9766.156 6463.095 921


 


Output Total 0 0 0


 


Balance 7910.904 3052.095 921


 


Intake:   


 


   824 721


 


    Dextrose 5% in Water 1, 400  





    000 ml @ 100 mls/hr IV .   





    [id number] [facility] with Sodium   





    Bicarb (1 Meq/ml) 150 ml   





    Rx#:[id number]   


 


    Sodium Chloride 0.9% 1, 200 800 700





    000 ml @ 100 mls/hr IV .   





    Q10H [facility] Rx#:[id number]   


 


    pressure bag 24 24 21


 


  Intake, IV Titration 1040.001 467.095 200





  Amount   


 


    Cisatracurium 200 mg In 156.894  





    Sodium Chloride 0.9% 180   





    ml @ 1 MCG/KG/MIN 5.928   





    mls/hr IV .Q24H [facility] Rx#:   





    [id number]   


 


    Diltiazem 125 mg In 37 75.75 





    Sodium Chloride 0.9% 100   





    ml @ 10 MG/HR 10 mls/hr   





    IV .[id number] [facility] Rx#:   





    [id number]   


 


    Norepinephrine 32 mg In  188.240 





    Sodium Chloride 0.9% 218   





    ml @ 0.4 MCG/KG/MIN 19.   





    406 mls/hr IV .[id number]   





    [facility] Rx#:[id number]   


 


    Norepinephrine 4 mg In 449.742  





    Sodium Chloride 0.9% 250   





    ml @ 0.05 MCG/KG/MIN 18.   





    821 mls/hr IV .[id number]   





    [facility] Rx#:[id number]   


 


    Sodium Chloride 0.9% 1, 100  





    000 ml @ 100 mls/hr IV .   





    Q10H [facility] Rx#:[id number]   


 


    fentaNYL (PF). 1,000 mcg   100





    In Sodium Chloride 0.9%   





    80 ml @ Per Protocol IV .   





    Q0M [facility] Rx#:[id number]   


 


    propofoL 1,000 mg In 296.365 203.105 100





    Empty Bag 1 bag @ Titrate   





    IV .Q0M [facility] Rx#:   





    [id number]   


 


  Tube Feeding 282 68 0


 


  Other 30 30 


 


Output:   


 


  Urine 0 0 0


 


  Hemodialysis 0  


 


Other:   


 


  Voiding Method Indwelling Catheter Indwelling Catheter Indwelling Catheter


 


  Weight  112.4 kg 








                         ABP, PAP, CO, CI - Last 8 Hours











Arterial Blood Pressure        51/38


 


Arterial Blood Pressure        51/37


 


Arterial Blood Pressure        52/38


 


Arterial Blood Pressure        53/37


 


Arterial Blood Pressure        49/36


 


Arterial Blood Pressure        54/39


 


Arterial Blood Pressure        54/36


 


Arterial Blood Pressure        55/39


 


Arterial Blood Pressure        55/39


 


Arterial Blood Pressure        55/40


 


Arterial Blood Pressure        56/40


 


Arterial Blood Pressure        58/40


 


Arterial Blood Pressure        45/36


 


Arterial Blood Pressure        60/42


 


Arterial Blood Pressure        62/43


 


Arterial Blood Pressure        64/43


 


Arterial Blood Pressure        65/44


 


Arterial Blood Pressure        71/46


 


Arterial Blood Pressure        73/47


 


Arterial Blood Pressure        82/50


 


Arterial Blood Pressure        85/51


 


Arterial Blood Pressure        76/52


 


Arterial Blood Pressure        76/50


 


Arterial Blood Pressure        77/48


 


Arterial Blood Pressure        90/51


 


Arterial Blood Pressure        80/54


 


Arterial Blood Pressure        93/56


 


Arterial Blood Pressure        83/48


 


Arterial Blood Pressure        82/49


 


Arterial Blood Pressure        88/47


 


Arterial Blood Pressure        77/47


 


Arterial Blood Pressure        85/53

















Results





                                 21 04:45





                                 21 05:00


                                 Cardiac Enzymes











  21 Range/Units





  05:00 


 


AST  81 H  (17-59)  U/L


 


Lactate Dehydrogenase  1645 H  (313-618)  U/L








                                       CBC











  21 Range/Units





  04:45 


 


WBC  40.3 H  (3.8-10.6)  k/uL


 


RBC  4.05 L  (4.30-5.90)  m/uL


 


Hgb  12.7 L  (13.0-17.5)  gm/dL


 


Hct  36.4 L  (39.0-53.0)  %


 


Plt Count  114 L  (150-450)  k/uL








                          Comprehensive Metabolic Panel











  21 Range/Units





  05:00 


 


Sodium  136 L  (137-145)  mmol/L


 


Potassium  5.2 H  (3.5-5.1)  mmol/L


 


Chloride  106  ()  mmol/L


 


Carbon Dioxide  20 L  (22-30)  mmol/L


 


BUN  44 H  (9-20)  mg/dL


 


Creatinine  4.90 H  (0.66-1.25)  mg/dL


 


Glucose  133 H  (74-99)  mg/dL


 


Calcium  6.2 L*  (8.4-10.2)  mg/dL


 


AST  81 H  (17-59)  U/L


 


ALT  187 H  (4-49)  U/L


 


Alkaline Phosphatase  155 H  ()  U/L


 


Total Protein  4.1 L  (6.3-8.2)  g/dL


 


Albumin  1.9 L  (3.5-5.0)  g/dL








                               Current Medications











Generic Name Dose Route Start Last Admin





  Trade Name Freq  PRN Reason Stop Dose Admin


 


Acetaminophen  650 mg  21 22:59  21 01:15





  Acetaminophen Tab 325 Mg Tab  PO   650 mg





  Q6HR PRN   Administration





  Mild Pain or Fever > 100.5  


 


Albuterol Sulfate  2 puff  21 22:19  21 07:58





  Albuterol Hfa Inhaler  INHALATION   2 puff





  RT-Q6H PRN   Administration





  Shortness Of Breath Or Wheezing  


 


Artificial Tears  2 drops  21 00:00  21 11:32





  Artificial Tears-Hypromellose Drops 15 Ml Btl  BOTH EYES   Not Given





  Q4HR [facility]  


 


Ascorbic Acid  500 mg  21 09:00  21 09:07





  Ascorbic Acid 500 Mg Tab  PO   Not Given





  DAILY [facility]  


 


Bacitracin/Polymyxin B Sulfate  1 applic  21 21:00  21 09:26





  Bacitracin/Polymyx 500-10,000 Unit/Gm Oint 14 Gm Tube  TOPICAL   1 applic





  BID SHAZIA   Administration


 


Bisacodyl  10 mg  21 08:09 





  Bisacodyl 10 Mg Supp  RECTAL  





  HS PRN  





  Constipation  


 


Chlorhexidine Gluconate  15 ml  21 09:00  21 09:25





  Chlorhexidine Gluconate 15 Ml Cup  MUCOUS MEM   15 ml





  BID SHAZIA   Administration


 


Cholecalciferol  50 mcg  21 09:00  21 09:07





  Cholecalciferol 25 Mcg (1000 Iu) Tablet  PO   Not Given





  DAILY [facility]  


 


Dexamethasone Sodium Phosphate  6 mg  21 09:00  21 09:25





  Dexamethasone Sod Phosphate 10 Mg/Ml 1 Ml Vial  IV   6 mg





  DAILY [facility]   Administration


 


Enoxaparin Sodium  30 mg  21 09:00  21 09:25





  Enoxaparin 30 Mg/0.3 Ml Syringe  SQ   30 mg





  DAILY [facility]   Administration


 


Sodium Chloride  1,000 mls @ 100 mls/hr  21 08:30  21 08:27





  Saline 0.9%  IV   Not Given





  .Q10H [facility]  


 


Dexmedetomidine HCl 400 mcg/  100 mls @ 0 mls/hr  21 03:00  21 01:16





  IV Solution  IV   0.7 mcg/kg/hr





  .Q0M [facility]   16.993 mls/hr





    Administration





  Protocol  





  Titrate  


 


Propofol 1,000 mg/ IV Solution  100 mls @ 0 mls/hr  21 02:15  21 

09:26





  IV   75 mcg/kg/min





  .Q0M [facility]   50.58 mls/hr





    Administration





  Protocol  





  Titrate  


 


Cisatracurium Besylate 200 mg/  200 mls @ 5.928 mls/hr  21 04:00  21

19:57





  Sodium Chloride  IV   2 mcg/kg/min





  .Q24H [facility]   11.856 mls/hr





    Administration





  Protocol  





  1 MCG/KG/MIN  


 


Fentanyl Citrate 1,000 mcg/  100 mls @ 0 mls/hr  21 05:30  21 11:49





  Sodium Chloride  IV   0.5 mcg/kg/hr





  .Q0M [facility]   4.94 mls/hr





    Administration





  Protocol  





  Per Protocol  


 


Anidulafungin 100 mg/ Sodium  130 mls @ 84 mls/hr  21 09:00  21 

11:32





  Chloride  IVPB   Not Given





  DAILY [facility]  


 


Vasopressin 60 unit/ Sodium  153 mls @ 4.59 mls/hr  21 06:00  21 

20:00





  Chloride  IV   4.59 mls/hr





  .Q24H [facility]   Administration





  0.03 UNITS/MIN  


 


Cefepime HCl 1 gm/ Sodium  50 mls @ 12.5 mls/hr  21 18:00  21 06:54





  Chloride  IVPB   12.5 mls/hr





  Q12H [facility]   Administration


 


Diltiazem HCl 125 mg/ Sodium  125 mls @ 10 mls/hr  21 16:15  21 

06:39





  Chloride  IV   15 mg/hr





  .[id number] [facility]AZIA   15 mls/hr





    Administration





  10 MG/HR  


 


Norepinephrine Bitartrate 32  250 mls @ 19.406 mls/hr  21 20:30  21 

06:00





  mg/ Sodium Chloride  IV   0.7 mcg/kg/min





  .J37B32M SHAZIA   33.961 mls/hr





    Titration





  Protocol  





  0.4 MCG/KG/MIN  


 


Insulin Aspart  0 unit  21 00:15  21 13:46





  Insulin Aspart (Novolog) 100 Unit/Ml Vial  SQ   Not Given





  Q6H [facility]  





  Protocol  


 


Miscellaneous Information  1 each  21 07:11 





  Vancomycin Iv Per Pharmacy 1 Each Mis  MISCELLANE  





  AS DIRECTED PRN  





  Per Protocol  





  Protocol  


 


Morphine Sulfate  4 mg  21 10:30  21 01:41





  Morphine Sulfate 4 Mg/Ml Syringe  IVP   4 mg





  Q2HR PRN   Administration





  Pain  


 


Naloxone HCl  0.2 mg  21 22:59 





  Naloxone 0.4 Mg/Ml 1 Ml Vial  IV  





  Q2M PRN  





  Opioid Reversal  


 


Pantoprazole Sodium  40 mg  21 11:00  21 09:26





  Pantoprazole 40 Mg/10 Ml Vial  IVP   40 mg





  DAILY [facility]   Administration


 


Saliva Substitute  1 spray  21 09:32  21 09:48





  Dry Mouth Spray 44.3 Spray/44.3 Ml Spray  MUCOUS MEM   1 spray





  QID PRN   Administration





  Dry Mouth  


 


Sodium Biphosphate/Sodium Phosphate  133 ml  21 20:19 





  Na Phos,M-B/Na Phos,Di-Ba 133 Ml Enema  RECTAL  21 20:20 





  ONCE PRN  





  Constipation  


 


Sodium Chloride  10 ml  04/15/21 17:00 





  Sodium Chloride 0.9% Flush 10 Ml Syringe  IV  





  Q4HR PRN  





  PICC Line  


 


Sodium Chloride  10 ml  21 09:00 





  Sodium Chloride 0.9% Flush 10 Ml Syringe  IV  





  WEEKLY [facility]IA  


 


Sodium Chloride  20 ml  04/15/21 17:00 





  Sodium Chloride 0.9% Flush 10 Ml Syringe  IV  





  Q4HR PRN  





  PICC Line  


 


Tamsulosin HCl  0.4 mg  21 09:00  21 09:08





  Tamsulosin 0.4 Mg Cap.Er.24h  PO   Not Given





  DAILY [facility]  


 


Zinc Sulfate  220 mg  21 09:00  21 09:08





  Zinc Sulfate 220 Mg Cap  PO   Not Given





  DAILY [facility]  








                                Intake and Output











 21





 22:59 06:59 14:59


 


Intake Total 4347.639 1881.095 921


 


Output Total 0 0 0


 


Balance 2154.319 7282.095 921


 


Intake:   


 


   824 721


 


    Dextrose 5% in Water 1, 400  





    000 ml @ 100 mls/hr IV .   





    [id number] [facility] with Sodium   





    Bicarb (1 Meq/ml) 150 ml   





    Rx#:[id number]   


 


    Sodium Chloride 0.9% 1, 200 800 700





    000 ml @ 100 mls/hr IV .   





    Q10H [facility] Rx#:[id number]   


 


    pressure bag 24 24 21


 


  Intake, IV Titration 1040.001 467.095 200





  Amount   


 


    Cisatracurium 200 mg In 156.894  





    Sodium Chloride 0.9% 180   





    ml @ 1 MCG/KG/MIN 5.928   





    mls/hr IV .Q24H [facility] Rx#:   





    [id number]   


 


    Diltiazem 125 mg In 37 75.75 





    Sodium Chloride 0.9% 100   





    ml @ 10 MG/HR 10 mls/hr   





    IV .[id number] [facility] Rx#:   





    [id number]   


 


    Norepinephrine 32 mg In  188.240 





    Sodium Chloride 0.9% 218   





    ml @ 0.4 MCG/KG/MIN 19.   





    406 mls/hr IV .[id number]   





    [facility] Rx#:[id number]   


 


    Norepinephrine 4 mg In 449.742  





    Sodium Chloride 0.9% 250   





    ml @ 0.05 MCG/KG/MIN 18.   





    821 mls/hr IV .[id number]   





    [facility] Rx#:[id number]   


 


    Sodium Chloride 0.9% 1, 100  





    000 ml @ 100 mls/hr IV .   





    Q10H [facility] Rx#:[id number]   


 


    fentaNYL (PF). 1,000 mcg   100





    In Sodium Chloride 0.9%   





    80 ml @ Per Protocol IV .   





    Q0M [facility] Rx#:[id number]   


 


    propofoL 1,000 mg In 296.365 203.105 100





    Empty Bag 1 bag @ Titrate   





    IV .Q0M [facility] Rx#:   





    [id number]   


 


  Tube Feeding 282 68 0


 


  Other 30 30 


 


Output:   


 


  Urine 0 0 0


 


  Hemodialysis 0  


 


Other:   


 


  Voiding Method Indwelling Catheter Indwelling Catheter Indwelling Catheter


 


  Weight  112.4 kg 








                                        





                                 21 04:45 





                                 21 05:00

## 2021-04-21 NOTE — CONS
CONSULTATION



DATE OF SERVICE:

04/20/2021



REASON FOR CONSULTATION:

Bacteremia.



HISTORY OF PRESENT ILLNESS:

The patient is a 50-year-old  male who presented to [facility] about 3 weeks ago for evaluation of worsening shortness of breath, generalized

weakness and cough, sore throat that has been going on for about 2 weeks before he

presented to the hospital.  The patient was diagnosed with COVID and chest x-ray showed

bilateral infiltrates.  Patient has been evaluated by admitting and pulmonary team and

has been treated for underlying COVID pneumonia.  The patient was transferred to the

ICU on the 13 of April and has been on BiPAP. Yesterday morning, the patient did go

into respiratory distress and had to be intubated.  The patient also became hypotensive

and did have worsening of his kidney function.  The patient this morning spiked a fever

of 101.3 degrees Fahrenheit.  The patient did have a worsening of his kidney function

and hyperkalemia for which a dialysis catheter has been placed and currently getting

dialyzed.  The patient did have blood culture obtained yesterday morning which is now

coming back positive with Staph aureus.  The patient was started on vancomycin,

cefepime and Eraxis by intensivist. Infectious Disease was consulted for further

management. All of this information has been obtained from thorough review of the

chart, talking to nursing staff as the patient is currently intubated and unable to

provide any history.  The patient is currently on high dose pressor support to maintain

his blood pressure and is on 100% FiO2.  Minimal secretions through the ET and no

diarrhea has been reported.



REVIEW OF SYSTEMS:

Positive points have been mentioned in HPI.  Rest of the systems are negative.



PAST MEDICAL HISTORY:

Gastroesophageal reflux disease, pneumonia, chronic low back pain, bronchitis.



PAST SURGICAL HISTORY:

Cholecystectomy.



SOCIAL HISTORY:

Remote history of smoking. No drinking or drug use.



FAMILY HISTORY:

Mother with history of brain and bone cancer.



ALLERGIES:

SULFA and AMOXICILLIN



MEDICATIONS:

The patient is currently on Tylenol, amiodarone, Eraxis, cefepime 1 q.12 hours,

vancomycin, diltiazem, Lovenox, fentanyl, norepinephrine, Protonix, propofol, Flomax

and zinc sulfate.



PHYSICAL EXAMINATION:

VITAL SIGNS: Blood pressure 99/52 with a pulse of 103, temperature 100.4, T-max 103, he

is 88% on 100% FIO2.

GENERAL DESCRIPTION: Patient is a middle-aged male intubated on the vent.

HEENT:  Examination shows slight pallor, no scleral icterus.  Patient is orally

intubated.

NECK: Trachea central, no thyromegaly.

LUNGS: Unlabored breathing, coarse breath sounds bilaterally, no wheeze.

HEART: S1-S2, regular rate and rhythm.

ABDOMEN:  Soft, no tenderness. No guarding or rigidity.

EXTREMITIES: No edema of the feet.

SKIN: No rash or mass palpable.

NEUROLOGICAL: Patient is intubated, on the vent.



LABS:

Hemoglobin 13.1, white count 32.4, BUN of 47, creatinine 3.59.  Liver enzymes are

elevated.  LDH is 2300. Procalcitonin 3.62.  Blood culture with Staph aureus.  Chest x-

ray with worsening bibasilar infiltrate.



DIAGNOSTIC IMPRESSION:

1. Patient with sepsis in this patient who did have a fever and significantly elevated

    white count and hypotension requiring pressor support.  Now with evidence gram-

    positive test of his bacteremia source likely pneumonia.  Patient does have other

    risk factors including having a PICC line, ART line however they have been recently

    placed and a dialysis catheter replaced recently.

2. Patient with renal insufficiency and high risk of nephrotoxicity.



PLAN:

1. Vancomycin, pharmacy to dose target of 15 while watching his kidney function very

    closely.  We will not use Zyvox as the patient is bacteremic and daptomycin will

    not work for a Staphylococcus aureus pneumonia.

2. Repeat blood cultures to document clearance of bacteremia.

3. We will follow on clinical condition to further adjust medication if needed. Thank

    you for this consultation.  Will follow this patient along with you.





MMODL / IJN: [id number] / Job#: [id number]

## 2021-04-21 NOTE — P.PN
Subjective


Progress Note Date: 04/21/21


Malcom Griffith is a 51 yo M with PMH of allergies who presented to the ED via EMS

with worsening malaise and shortness of breath. He states he developed a cough 

and sore throat about 2 weeks ago which has worsened since then. He complains of

fever chills and shortness of breath. On arrival he was febrile tachypenic and 

hypoxic SpO2 94% on 4 L O2. WBC 5.8, lymphopenia, , , COVID 

positive, CXR with coarse bilateral infiltrates.





[id number] maintained on 90% airflow, O2 sats in the low 90s.  Positive 

nonproductive cough .  Outside window for Remdesevir, continue on steroids, 

vitamins,lovenox. Chest pain, palpitations, complains of chronic back pain.  T-

max 100.2.  Preliminary blood cultures reporting no growth at 24 hours





04/05/2021 status post convalescent plasma, TOCI, maintaining O2 sats in the 90s

on 100% BiPAP.  Chest x-ray reporting stable diffuse bilateral infiltrates.  

Afebrile, T-max 99.2, WBC 14.7.  Hemoglobin 15.7, platelets 321.  BUN 41, 

creatinine 0.94. D-dimer 1.45, ferritin 1511.4, LDH 1025, CRP 54.1.





04/06/2021 alternating between BiPAP and 15 L high flow nasal cannula combined 

with nonrebreather, maintaining O2 sats in the high 80s.  Chest x-ray reporting 

diffuse bilateral infiltrates stable. T-max 99,WBC 14.  Hemoglobin 14.7, 

platelets 328 D-dimer 1.76, ferritin and CRP decreased.  BUN 39, creatinine 

0.91.








04/07/2021 maintained on 15 L-nasal cannula with  nonrebreather mask, IV 

steroids, bronchodilators,maintaining O2 sats in the high 80s.  Did not Require 

BiPAP during the night.  Feels better.  Nonproductive cough.  Complains of mild 

chest tightness with coughing. Chest x-ray reporting stable diffuse bilateral 

infiltrate.  Afebrile, WBC 13.6.  Hemoglobin 14.8, platelets 300. BUN 33, 

creatinine 0.86.  Blood sugars controlled. LDH elevated, 1127, CRP down to 17.6.










04/08/2021  continue on IV steroids, vitamins, maintaining O2 sats in the mid to

high 80s on 15 L nasal cannula plus nonrebreather.  Nonproductive cough.  

Reports she feels better.  Chest x-ray reporting no change in bilateral 

infiltrates.  Afebrile.  Creatinine 0.72.





04/09/2021 Continues on airflow high flow oxygen at 60 L/m and 90% FiO2 along 

with a nonrebreather mask, maintaining O2 sats in the high 80s.   Minimal cough.

 Complains of dry mouth.  He reports, continues to feel better.  Afebrile, WBC 

down to 12.2.  D-dimer, CRP decreased.  LDH increased ,1196.





04/12/2021 maintained on high flow nasal cannula 60 L in addition to 

nonrebreather maintaining better O2 sats  in the high 90s to 100%.  Chest x-ray 

reporting stable bilateral infiltrates .complains of exertional shortness of 

breath. Afebrile, T-max 99.5, WBC 19.  D-dimer, ferritin, LDH  decreased, CRP 

8.1.  BUN 25, creatinine 0.75.  Scheduled to have midline placed today








04/13/2021 rough night, Precedex initiated and patient placed on BiPAP.  This 

morning ,desating down into the 60s, on BiPAP.FiO2 adjusted, with reported O2 

sats improved into the 80s.Chest x-ray reporting stable  patchy bilateral Inf

iltrates.  Doppler reporting negative for DVT of bilateral lower extremities.  

Inflammatory markers elevated.








04/14/2021 difficulty oxygenating yesterday, converted over to BiPAP, currently 

on 100% FiO2 maintaining O2 sats of 63 to low 80s. Unstable to travel for CTA. 

Maintained on Lovenox . Continues on Precedex, covid cocktail, including IV 

Solu-Medrol.Chest x-ray reporting similar to prior exam.  Received Lasix 

yesterday, diuresed well with 24-hour I&O reflecting a negative fluid balance.  

BUN 40, creatinine 0.88.  Afebrile, WBC 20. 








04/15/2021 anxiety significantly improved on Precedex and morphine.  Maintaining

O2 sats in the high 80s on 100% BiPAP pressure 16/12.  Chest x-ray reporting 

some slight improved aeration at the right lung base.  





Therapeutic dosed Lovenox; unstable to travel for CTA to rule out PE .Afebrile, 

WBC 18.4.  Diuresing and remains in a negative fluid balance.  BUN 50, 

creatinine 0.99.








04/16/2021 continues to require BiPAP 100% FiO2, maintaining O2 sats high 80s to

low 90s.  D-dimer 1.77. therapeutic dosed Lovenox decreased. LDH 1371, CRP 6.3. 

24-hour I&O remains in a negative fluid balance. BUN 47, creatinine 0.78.   TPN 

for nutritional support ,Blood sugars controlled.T bili 1.4, ALT 1:15.  

Afebrile, WBC 21.3.








04/19/2021 worsening respiratory status with hypoxia throughout the night, 

requiring intubation in the early morning hours. Vent dependent, FiO2 100%, FiO2

20 %, ABGs noted.  Chest x-ray reporting unchanged patchy bibasilar opacities 

right greater than left.  Maintained on Nimbex, fentanyl, Levophed and diprovan 

drips and fluid bolus.  WBC up to 55.3 ,continues on Eraxis, cefepime and 

vancomycin.  Serial ABGs noted.








04/20/2021 vent-dependent, FiO2 100%, PEEP 20.  Sedated on fentanyl, diprovan. 

Requiring pressor support with both Levophed and vasopressin.  Critical 

potassium 7.1, bicarb 22, BUN 47, creatinine 3.59.  Received hyperkalemic 

cocktail, potassium down to 6.1, nephrology consulted.  Bicarb drip initiated.  

Vascular surgery consulted for emergent dialysis catheter placement.  Maintained

 on vancomycin, cefepime, and Eraxis,blood culture positive for staph aureus, 

final sensitivity is pending, sputum pending. Chest x-ray reporting similar 

findings.  T-max 101.3 WBC 52.4.  D-dimer 3.51, LDH 2300, , CRP 7.3.








04/21/2021 clinically worsening. In and out of atrial fibrillation with RVR with

heart rates up into the 160s, blood pressure unable to tolerate.  Maintained on 

amiodarone ,Cardizem, Levophed, and vasopressin drips. Blood pressures currently

in the high 70s.  Bacteremic , staph aureus in both preliminary blood cultures 

and sputum .Continues on Eraxis, cefepime, vancomycin as per ID.  T-max 100.4, 

WBC down to 40.3.  Remains vent dependent, FiO2 100% +18 Peep.  Requiring both 

sedation and paralytic, on diprovan, fentanyl and Nimbex drips. O2 sats 80-83%. 

ABGs noted, continues on bicarb drip.  Received emergent dialysis with potassium

down to 5.2, BUN 44, creatinine 4.9.  Magnesium 1.5.





Objective





- Vital Signs


Vital signs: 


                                   Vital Signs











Temp  98.4 F   04/21/21 04:00


 


Pulse  135 H  04/21/21 08:45


 


Resp  40 H  04/21/21 08:45


 


BP  113/66   04/21/21 07:00


 


Pulse Ox  80 L  04/21/21 08:45








                                 Intake & Output











 04/20/21 04/21/21 04/21/21





 18:59 06:59 18:59


 


Intake Total 3351.048 2479.096 306


 


Output Total 5 0 0


 


Balance 3346.048 2479.096 306


 


Weight 103.5 kg 112.4 kg 


 


Intake:   


 


  IV 1033 1136 206


 


    Dextrose 5% in Water 1, 1000 100 





    000 ml @ 100 mls/hr IV .   





    [id number] SHAZIA with Sodium   





    Bicarb (1 Meq/ml) 150 ml   





    Rx#:[id number]   


 


    Sodium Chloride 0.9% 1,  1000 200





    000 ml @ 100 mls/hr IV .   





    Q10H SHAZIA Rx#:[id number]   


 


    pressure bag 33 36 6


 


  Intake, IV Titration 6078.056 5286.096 100





  Amount   


 


    Cisatracurium 200 mg In  156.894 





    Sodium Chloride 0.9% 180   





    ml @ 1 MCG/KG/MIN 5.928   





    mls/hr IV .Q24H SHAZIA Rx#:   





    [id number]   


 


    Diltiazem 125 mg In  112.75 





    Sodium Chloride 0.9% 100   





    ml @ 10 MG/HR 10 mls/hr   





    IV .[id number] SHAZIA Rx#:   





    [id number]   


 


    Norepinephrine 32 mg In  188.240 





    Sodium Chloride 0.9% 218   





    ml @ 0.4 MCG/KG/MIN 19.   





    406 mls/hr IV .[id number]   





    SHAZIA Rx#:[id number]   


 


    Norepinephrine 4 mg In 1520.325 195.742 





    Sodium Chloride 0.9% 250   





    ml @ 0.05 MCG/KG/MIN 18.   





    821 mls/hr IV .[id number]   





    SHAZIA Rx#:[id number]   


 


    Sodium Chloride 0.9% 1, 100  





    000 ml @ 100 mls/hr IV .   





    Q10H SHAZIA Rx#:[id number]   


 


    fentaNYL (PF). 1,000 mcg 77.723  





    In Sodium Chloride 0.9%   





    80 ml @ Per Protocol IV .   





    Q0M SHAZIA Rx#:[id number]   


 


    propofoL 1,000 mg In 300 399.470 100





    Empty Bag 1 bag @ Titrate   





    IV .Q0M HSAZIA Rx#:   





    [id number]   


 


  Tube Feeding 320 230 0


 


  Other  60 


 


Output:   


 


  Urine 5 0 0


 


  Hemodialysis 0  


 


Other:   


 


  Voiding Method Indwelling Catheter Indwelling Catheter Indwelling Catheter








                       ABP, PAP, CO, CI - Last Documented











Arterial Blood Pressure        77/48

















- Exam


Limited exam secondary to a intubated covid patient ,full exam deferred to 

intensivist





General: Sitting up in bed, intubated, sedated and on paralytics


CV: irregular,tachycardic


Neuro: Unable to assess, patient on paralytics,sedated and on mechanical 

ventilation











- Labs


CBC & Chem 7: 


                                 04/21/21 04:45





                                 04/21/21 05:00


Labs: 


                  Abnormal Lab Results - Last 24 Hours (Table)











  04/20/21 04/20/21 04/20/21 Range/Units





  04:30 12:24 18:04 


 


WBC     (3.8-10.6)  k/uL


 


RBC     (4.30-5.90)  m/uL


 


Hgb     (13.0-17.5)  gm/dL


 


Hct     (39.0-53.0)  %


 


Plt Count     (150-450)  k/uL


 


Neutrophils #     (1.3-7.7)  k/uL


 


Lymphocytes #     (1.0-4.8)  k/uL


 


Monocytes #     (0-1.0)  k/uL


 


ABG pH     (7.35-7.45)  


 


ABG pCO2     (35-45)  mmHg


 


ABG pO2     ()  mmHg


 


ABG O2 Saturation     (94-97)  %


 


Sodium     (137-145)  mmol/L


 


Potassium     (3.5-5.1)  mmol/L


 


Carbon Dioxide     (22-30)  mmol/L


 


BUN     (9-20)  mg/dL


 


Creatinine     (0.66-1.25)  mg/dL


 


Glucose     (74-99)  mg/dL


 


POC Glucose (mg/dL)   121 H  144 H  (75-99)  mg/dL


 


Calcium     (8.4-10.2)  mg/dL


 


Phosphorus     (2.5-4.5)  mg/dL


 


Magnesium     (1.6-2.3)  mg/dL


 


Ferritin  2817.8 H    (22.0-322.0)  ng/mL


 


AST     (17-59)  U/L


 


ALT     (4-49)  U/L


 


Alkaline Phosphatase     ()  U/L


 


Lactate Dehydrogenase     (313-618)  U/L


 


Creatine Kinase     ()  U/L


 


C-Reactive Protein     (<1.0)  mg/dL


 


Total Protein     (6.3-8.2)  g/dL


 


Albumin     (3.5-5.0)  g/dL


 


Random Vancomycin     ug/mL














  04/20/21 04/21/21 04/21/21 Range/Units





  23:50 00:24 04:45 


 


WBC    40.3 H  (3.8-10.6)  k/uL


 


RBC    4.05 L  (4.30-5.90)  m/uL


 


Hgb    12.7 L  (13.0-17.5)  gm/dL


 


Hct    36.4 L  (39.0-53.0)  %


 


Plt Count    114 L  (150-450)  k/uL


 


Neutrophils #    37.9 H  (1.3-7.7)  k/uL


 


Lymphocytes #    0.4 L  (1.0-4.8)  k/uL


 


Monocytes #    1.2 H  (0-1.0)  k/uL


 


ABG pH     (7.35-7.45)  


 


ABG pCO2     (35-45)  mmHg


 


ABG pO2     ()  mmHg


 


ABG O2 Saturation     (94-97)  %


 


Sodium     (137-145)  mmol/L


 


Potassium     (3.5-5.1)  mmol/L


 


Carbon Dioxide     (22-30)  mmol/L


 


BUN     (9-20)  mg/dL


 


Creatinine     (0.66-1.25)  mg/dL


 


Glucose     (74-99)  mg/dL


 


POC Glucose (mg/dL)  144 H  123 H   (75-99)  mg/dL


 


Calcium     (8.4-10.2)  mg/dL


 


Phosphorus     (2.5-4.5)  mg/dL


 


Magnesium     (1.6-2.3)  mg/dL


 


Ferritin     (22.0-322.0)  ng/mL


 


AST     (17-59)  U/L


 


ALT     (4-49)  U/L


 


Alkaline Phosphatase     ()  U/L


 


Lactate Dehydrogenase     (313-618)  U/L


 


Creatine Kinase     ()  U/L


 


C-Reactive Protein     (<1.0)  mg/dL


 


Total Protein     (6.3-8.2)  g/dL


 


Albumin     (3.5-5.0)  g/dL


 


Random Vancomycin     ug/mL














  04/21/21 04/21/21 04/21/21 Range/Units





  04:45 05:00 05:40 


 


WBC     (3.8-10.6)  k/uL


 


RBC     (4.30-5.90)  m/uL


 


Hgb     (13.0-17.5)  gm/dL


 


Hct     (39.0-53.0)  %


 


Plt Count     (150-450)  k/uL


 


Neutrophils #     (1.3-7.7)  k/uL


 


Lymphocytes #     (1.0-4.8)  k/uL


 


Monocytes #     (0-1.0)  k/uL


 


ABG pH    7.08 L*  (7.35-7.45)  


 


ABG pCO2    75 H*  (35-45)  mmHg


 


ABG pO2    57 L*  ()  mmHg


 


ABG O2 Saturation    86.8 L  (94-97)  %


 


Sodium   136 L   (137-145)  mmol/L


 


Potassium   5.2 H   (3.5-5.1)  mmol/L


 


Carbon Dioxide   20 L   (22-30)  mmol/L


 


BUN   44 H   (9-20)  mg/dL


 


Creatinine   4.90 H   (0.66-1.25)  mg/dL


 


Glucose   133 H   (74-99)  mg/dL


 


POC Glucose (mg/dL)     (75-99)  mg/dL


 


Calcium   6.2 L*   (8.4-10.2)  mg/dL


 


Phosphorus   6.9 H   (2.5-4.5)  mg/dL


 


Magnesium   1.5 L   (1.6-2.3)  mg/dL


 


Ferritin     (22.0-322.0)  ng/mL


 


AST   81 H   (17-59)  U/L


 


ALT   187 H   (4-49)  U/L


 


Alkaline Phosphatase   155 H   ()  U/L


 


Lactate Dehydrogenase   1645 H   (313-618)  U/L


 


Creatine Kinase   345 H   ()  U/L


 


C-Reactive Protein   6.2 H   (<1.0)  mg/dL


 


Total Protein   4.1 L   (6.3-8.2)  g/dL


 


Albumin   1.9 L   (3.5-5.0)  g/dL


 


Random Vancomycin  42.5 H*    ug/mL














  04/21/21 Range/Units





  06:30 


 


WBC   (3.8-10.6)  k/uL


 


RBC   (4.30-5.90)  m/uL


 


Hgb   (13.0-17.5)  gm/dL


 


Hct   (39.0-53.0)  %


 


Plt Count   (150-450)  k/uL


 


Neutrophils #   (1.3-7.7)  k/uL


 


Lymphocytes #   (1.0-4.8)  k/uL


 


Monocytes #   (0-1.0)  k/uL


 


ABG pH   (7.35-7.45)  


 


ABG pCO2   (35-45)  mmHg


 


ABG pO2   ()  mmHg


 


ABG O2 Saturation   (94-97)  %


 


Sodium   (137-145)  mmol/L


 


Potassium   (3.5-5.1)  mmol/L


 


Carbon Dioxide   (22-30)  mmol/L


 


BUN   (9-20)  mg/dL


 


Creatinine   (0.66-1.25)  mg/dL


 


Glucose   (74-99)  mg/dL


 


POC Glucose (mg/dL)  139 H  (75-99)  mg/dL


 


Calcium   (8.4-10.2)  mg/dL


 


Phosphorus   (2.5-4.5)  mg/dL


 


Magnesium   (1.6-2.3)  mg/dL


 


Ferritin   (22.0-322.0)  ng/mL


 


AST   (17-59)  U/L


 


ALT   (4-49)  U/L


 


Alkaline Phosphatase   ()  U/L


 


Lactate Dehydrogenase   (313-618)  U/L


 


Creatine Kinase   ()  U/L


 


C-Reactive Protein   (<1.0)  mg/dL


 


Total Protein   (6.3-8.2)  g/dL


 


Albumin   (3.5-5.0)  g/dL


 


Random Vancomycin   ug/mL








                      Microbiology - Last 24 Hours (Table)











 04/19/21 10:13 Gram Stain - Preliminary





 Sputum Sputum Culture - Preliminary





    Presumptive Staph aureus


 


 04/19/21 09:42 Blood Culture Gram Stain - Preliminary





 Blood Blood Culture - Preliminary





    Staphylococcus aureus














Assessment and Plan


Assessment: 


Severe sepsis, septic shock secondary to Covid pneumonia, beyond window for 

Remdesevir.  Possible PE, unstable for transfer for CTA, maintained on Lovenox. 

Sputum culture reporting staph aureus ,finalizing.





Acute hypoxic respiratory failure secondary to the above, mechanical vent 

dependent





A. fib with RVR





Bacteremia, staph aureus, final sensitivities pending





Worsening leukocytosis, suspect related to #1 and to bacteremia





Possible hypovolemic shock, pressor dependent





Acute renal failure, related to acute septic shock, ATN, status post emergent 

dialysis catheter placement , hemodialysis initiated





Critical hyperkalemia secondary to the above, improving





Metabolic and respiratory acidosis, on bicarb drip





Gastroesophageal reflux disease





Chronic low back pain





Former nicotine dependence





Medical debility, critical illness polyneuropathy





No code




















Plan: Continue on current medication regime ,monitoring and symptomatic 

treatment.blood culture/sputum finalizing .Repeat blood cultures in progress 

.ICU management as per pulmonary/intensivist.Covid regimen.   Wife updated 

earlier this morning, requesting to continue full support, full code.  Prognosis

poor given multiple complex medical issues.  Family arrived at bedside, met with

intensivist and CODE STATUS has now been changed to no code.

















The impression and plan of care has been dictated as directed.





:


I performed a history and examination of this patient,  discussed the same with 

the dictator.  I agree with the dictator's note ,documented as a scribe.  Any 

additional findings or plans will be noted.

## 2021-04-21 NOTE — P.PN
Progress Note - Text


Progress Note Date: 04/21/21


REASON FOR FOLLOWUP: Staph aureus pneumonia and bacteremia








INTERVAL HISTORY:


Patient fever pattern has improved and is afebrile this morning.  The patient 

remains to be intubated on the vent on 100% FiO2 still setting in the 70s 


the patient is hypotensive despite being on maximal pressor support patient has 

been made DNR but the family is refusing comfort care.





PHYSICAL EXAMINATION:


Blood pressure 62/43, pulse of 123, temperature 98.4 patient is satting 74% 100%

FiO2 





General description middle-aged male intubated on the vent respiratory system: 

Unlabored breathing, bilateral rhonchi 


HEART: S1, S2.  Regular rate and rhythm.


ABDOMEN:  Soft, no tenderness.





LABS: Reviewed sensitivity on staff aureus is still pending.





IMPRESSION/PLAN: Patient with acute respiratory failure and sepsis/septic shock 

which is multifactorial in this patient with initial admission to the 


hospital with a Covid pneumonia now with evidence of staph aureus bacteremia 

source likely pneumonia patient is covered with vancomycin, 


overall prognosis remains to be guarded.  Continue supportive care

## 2021-04-21 NOTE — P.PN
Progress Note - Text


Progress Note Date: 04/21/21





Patient is seen in the ICU.  He remains intubated and sedated.  He had a 

temporary hemodialysis catheter placed yesterday.  No complications or acute 

changes.  Patient had hemodialysis yesterday without any problems with the 

dialysis catheter.








For this consultation, we will sign off at this time.








The impression and plan of care has been dictated as directed.





Dr. Devlin


I performed a history and examination of this patient,  discussed the same with 

the dictator.  I agree with the dictator's note ,documented as a scribe.  Any 

additional findings or plans will be noted.

## 2021-04-22 VITALS — TEMPERATURE: 98.8 F | RESPIRATION RATE: 40 BRPM

## 2021-04-22 VITALS — SYSTOLIC BLOOD PRESSURE: 93 MMHG | DIASTOLIC BLOOD PRESSURE: 44 MMHG

## 2021-04-22 VITALS — HEART RATE: 85 BPM

## 2021-04-22 LAB — GLUCOSE BLD-MCNC: 74 MG/DL (ref 75–99)

## 2021-04-22 RX ADMIN — TAMSULOSIN HYDROCHLORIDE SCH: 0.4 CAPSULE ORAL at 10:42

## 2021-04-22 RX ADMIN — OXYCODONE HYDROCHLORIDE AND ACETAMINOPHEN SCH: 500 TABLET ORAL at 10:41

## 2021-04-22 RX ADMIN — ENOXAPARIN SODIUM SCH: 30 INJECTION SUBCUTANEOUS at 10:42

## 2021-04-22 RX ADMIN — DEXTRAN 70 AND HYPROMELLOSE 2910 SCH DROPS: 1; 3 SOLUTION/ DROPS OPHTHALMIC at 00:49

## 2021-04-22 RX ADMIN — CEFAZOLIN SCH MLS/HR: 330 INJECTION, POWDER, FOR SOLUTION INTRAMUSCULAR; INTRAVENOUS at 03:34

## 2021-04-22 RX ADMIN — DILTIAZEM HYDROCHLORIDE SCH: 5 INJECTION INTRAVENOUS at 05:28

## 2021-04-22 RX ADMIN — NOREPINEPHRINE BITARTRATE SCH MLS/HR: 1 INJECTION, SOLUTION, CONCENTRATE INTRAVENOUS at 02:42

## 2021-04-22 RX ADMIN — BACITRACIN ZINC AND POLYMYXIN B SULFATE SCH: 500; 10000 OINTMENT TOPICAL at 10:41

## 2021-04-22 RX ADMIN — DEXTRAN 70 AND HYPROMELLOSE 2910 SCH DROPS: 1; 3 SOLUTION/ DROPS OPHTHALMIC at 03:34

## 2021-04-22 RX ADMIN — CHLORHEXIDINE GLUCONATE SCH: 1.2 RINSE ORAL at 11:57

## 2021-04-22 RX ADMIN — Medication SCH: at 10:42

## 2021-04-22 RX ADMIN — PANTOPRAZOLE SODIUM SCH: 40 INJECTION, POWDER, FOR SOLUTION INTRAVENOUS at 10:42

## 2021-04-22 RX ADMIN — CEFEPIME HYDROCHLORIDE SCH MLS/HR: 1 INJECTION, POWDER, FOR SOLUTION INTRAMUSCULAR; INTRAVENOUS at 05:41

## 2021-04-22 RX ADMIN — DEXTRAN 70 AND HYPROMELLOSE 2910 SCH DROPS: 1; 3 SOLUTION/ DROPS OPHTHALMIC at 13:24

## 2021-04-22 RX ADMIN — INSULIN ASPART SCH: 100 INJECTION, SOLUTION INTRAVENOUS; SUBCUTANEOUS at 05:41

## 2021-04-22 RX ADMIN — ANIDULAFUNGIN SCH: 100 INJECTION, POWDER, LYOPHILIZED, FOR SOLUTION INTRAVENOUS at 10:41

## 2021-04-22 RX ADMIN — CISATRACURIUM BESYLATE SCH MLS/HR: 10 INJECTION INTRAVENOUS at 13:20

## 2021-04-22 RX ADMIN — INSULIN ASPART SCH: 100 INJECTION, SOLUTION INTRAVENOUS; SUBCUTANEOUS at 13:24

## 2021-04-22 RX ADMIN — INSULIN ASPART SCH: 100 INJECTION, SOLUTION INTRAVENOUS; SUBCUTANEOUS at 00:49

## 2021-04-22 RX ADMIN — DEXTROSE SCH: 50 INJECTION, SOLUTION INTRAVENOUS at 10:42

## 2021-04-22 RX ADMIN — SODIUM CHLORIDE SCH MLS/HR: 900 INJECTION, SOLUTION INTRAVENOUS at 08:13

## 2021-04-22 RX ADMIN — DEXTRAN 70 AND HYPROMELLOSE 2910 SCH: 1; 3 SOLUTION/ DROPS OPHTHALMIC at 10:41

## 2021-04-22 NOTE — P.PN
Subjective





HISTORY OF PRESENTING ILLNESS


Patient is a pleasant 50 year old male with history of allergies, former tobacco

and COVID 19 infection who initially presented 3/31 with worsening SOB, fatigue 

cough, fever.  He was found to have COVID 19 pneumonia and has had lengthy 

hospitalization, currently on ventilator and most of history is obtained from 

chart as he is nonresponsive on vent.  Cardiology was consulted for AFib with 

RVR.  He had a femoral guillermo placed and dialysis started yesterday due to 

worsened LETY.  He went into new Afib with RVR with HR's up to 170-180's with 

decrease in his BP.  He already has been on pressors and is currently on 

Vasopressin, high dose of Levophed with systolics in the 70-80's.  He was placed

on Amio drip, Cardizem drip with HR's still in the 110-120's.  








4/21


Patient's BP continues to decrease despite vasopressors, currently in the 50's. 

Discussed case with wife at bedside.  Currently in NSR on amiodarone drip.








REVIEW OF SYSTEMS


At the time of my exam:


Unable to obtain secondary to being on ventilator.





PHYSICAL EXAMINATION


Vitals reviewed


CONSTITUTIONAL: Ill appearing, non responsive on ventilator, 1+ anasarca


HEENT: Head is normocephalic. Pupils are equal, round. Mucous membranes of the 

mouth are dry.  +ETT, No carotid bruit.


CHEST EXAMINATION: Coarse BS bilaterally


HEART EXAMINATION: Regular rate and rhythm. S1, S2 heard. No murmurs, gallops or

rub.


ABDOMEN: Soft, Positive bowel sounds.


EXTREMITIES: 2+ peripheral pulses, +1+ LE edema


NEUROLOGIC EXAMINATION: Non responsive, sedated on vent 





ASSESSMENT


Acute hypoxic respiratory failure related to COVID 19 pneumonia/ ARDS


Septic shock


Staph aureus bacteremia


LETY


Paroxysmal Afib with RVR


Former tobacco abuse


Acidosis


Hyperkalemia


Elevated liver enzymes





PLAN


Continue with current medical therapy.  Patient continues to decline despite 

maximal medical therapy.  Patient appears to be actively dying with multiorgan 

failure. 





Objective





- Vital Signs


Vital signs: 


                                   Vital Signs











Temp  98.8 F   04/22/21 04:00


 


Pulse  90   04/22/21 07:00


 


Resp  40 H  04/22/21 07:00


 


BP  93/44   04/22/21 07:00


 


Pulse Ox  75 L  04/22/21 07:00








                                 Intake & Output











 04/21/21 04/22/21 04/22/21





 18:59 06:59 18:59


 


Intake Total 5593.869 3377.712 103


 


Output Total 0 0 0


 


Balance 1906.658 3652.712 103


 


Weight  125.6 kg 


 


Intake:   


 


  IV 1236 1236 103


 


    Sodium Chloride 0.9% 1, 1200 1200 100





    000 ml @ 100 mls/hr IV .   





    Q10H SHAZIA Rx#:[id number]   


 


    pressure bag 36 36 3


 


  Intake, IV Titration 695.076 664.712 





  Amount   


 


    Cisatracurium 200 mg In 200  





    Sodium Chloride 0.9% 180   





    ml @ 1 MCG/KG/MIN 5.928   





    mls/hr IV .Q24H SHAZIA Rx#:   





    [id number]   


 


    Diltiazem 125 mg In 125  





    Sodium Chloride 0.9% 100   





    ml @ 10 MG/HR 10 mls/hr   





    IV .[id number] SHAZIA Rx#:   





    [id number]   


 


    Norepinephrine 32 mg In 61.76 217.916 





    Sodium Chloride 0.9% 218   





    ml @ 0.4 MCG/KG/MIN 19.   





    406 mls/hr IV .[id number]   





    SHAZIA Rx#:[id number]   


 


    fentaNYL (PF). 1,000 mcg 108.316 91.684 





    In Sodium Chloride 0.9%   





    80 ml @ Per Protocol IV .   





    Q0M SHAZIA Rx#:[id number]   


 


    propofoL 1,000 mg In 200 355.112 





    Empty Bag 1 bag @ Titrate   





    IV .Q0M SHAZIA Rx#:   





    [id number]   


 


  Tube Feeding 0  


 


Output:   


 


  Urine 0 0 0


 


Other:   


 


  Voiding Method Indwelling Catheter Indwelling Catheter 








                       ABP, PAP, CO, CI - Last Documented











Arterial Blood Pressure        52/38

















- Labs


CBC & Chem 7: 


                                 04/21/21 04:45





                                 04/21/21 05:00


Labs: 


                  Abnormal Lab Results - Last 24 Hours (Table)











  04/21/21 04/21/21 04/22/21 Range/Units





  05:00 05:00 05:33 


 


POC Glucose (mg/dL)    74 L  (75-99)  mg/dL


 


Ferritin  1379.4 H    (22.0-322.0)  ng/mL


 


Vitamin D 25-Hydroxy   13.0 L   (30.0-100.0)  ng/mL








                      Microbiology - Last 24 Hours (Table)











 04/19/21 09:42 Blood Culture Gram Stain - Final





 Blood Blood Culture - Final





    Staphylococcus aureus


 


 04/19/21 10:13 Gram Stain - Final





 Sputum Sputum Culture - Final





    Staphylococcus aureus

## 2021-04-22 NOTE — P.PN
Subjective


Progress Note Date: 04/22/21


Principal diagnosis: 





Acute hypoxic respiratory failure secondary to COVID-19 pneumonia and ARDS





50-year-old male, with history of shortness of breath, who presents to the 

emergency department on March 31, a little after 9:00 PM.  He apparently was 

brought in by EMS.  I saw the patient in the emergency room, in room 33.  He was

on O2 several liters by nasal cannula and not receiving any IV fluids.  He has 

been sick for about 11 or 12 days.  He apparently just tested positive today.  

His symptoms included shortness of breath, weakness, fatigue, low saturations, 

and fever.  As an outpatient, he was on Tylenol, a Z-Alex, Claritin, Zofran, 

Flomax, and a Medrol Dosepak.  He does have ALLERGIES to penicillin antibiotics,

and sulfa antibiotics.  The patient was quite fatigued when I saw him in the 

emergency room.  He could barely open his eyes.  He was not demonstrating any s

igns or symptoms of respiratory compromise, and did not have any conversational 

dyspnea or accessory muscle use.  His white count was 4.2, hemoglobin 16, 

hematocrit 46.2, and platelet count 201,000.  PT/INR PTT were normal.  Sodium 

136, potassium 4.4, chlorides 104, CO2 24, anion gap 8, BUN 17, and creatinine 

0.94.  AST was 96, FiO2 104, LDH was 723, and C-reactive protein was 133.9.  

There was no d-dimer ordered.  A chest x-ray did reveal bilateral infiltrates.





The patient is seen today 04/02/2021 in follow-up on the regular medical floor. 

He is currently awake, alert, in no acute distress.  He is still requiring AirVo

high flow nasal cannula at 60 L and 90% FiO2 to maintain O2 saturations in the 

low 90s.  He is currently afebrile.  Hemodynamically stable.  Blood cultures 

reveal no growth.  He remains on Lovenox, Decadron, vitamin supplements. 





The patient is seen today 04/03/2021 in follow-up on the regular medical floor. 

He is currently resting on his right side in bed.  Still quite short of breath 

with minimal exertion.  Still requiring AirVo high flow oxygen at 60 L and 90% 

FiO2 to maintain O2 saturations in the 90s.  Chest x-ray continues to show 

persistent interstitial opacities improving and the left but now worsening on 

the right mid and lower lungs.  D-dimer 0.91.  .  C-reactive protein 2O2.

 He remains on dexamethasone, Lovenox, vitamin supplements.





The patient is seen today 04/04/2021 in follow-up in the intensive care unit.  

He was transferred here last evening after developing increasing shortness of 

breath and increasing oxygen requirements.  He is currently resting fairly 

comfortably in bed.  He remains on BiPAP 14/7 and 100% FiO2 to maintain O2 

saturations in the low 90s.  Chest x-ray continues to show persistent right 

greater than left diffuse interstitial opacities with interval decrease in the 

right lung base and mild increase on the left.  He did receive Tocilizumab and 

convalescent plasma yesterday.  He remains on Lovenox, IV Solu-Medrol, vitamin 

supplements.  White count 10.1.  Hemoglobin 15.5.  Lymphocytes 0.4.  D-dimer 

1.27.  Sodium 140.  Potassium 4.6.  Creatinine 0.82.  LDH 10,025.  C-reactive 

protein 171.





Progress note dated 04/05/2021.





The patient was admitted to the hospital on March 31.  He came to the ICU on 

April 3.  He is currently unfortunately on BiPAP at 14/7, and 100%.  He is 

getting saline at 75 mL an hour.  He is quite short of breath.  The patient did 

receive both convalescent plasma, and TOCI on April 3.  The patient doesn't feel

like he is getting better.  He doesn't feel any worse but just doesn't feel like

he is improving.  He does realize, that in the end, he may end up on the 

mechanical ventilator.  White count 14.7, hemoglobin 15.7, hematocrit 46.0, 

platelet count 321,000.  D-dimer is 1.45, and fibrinogen is 673.  Sodium 143, 

potassium 4.3, chlorides 109, CO2 26, anion gap 8, BUN 41, and creatinine 0.94. 

Most recent LDH is 1025.  The most recent C-reactive protein is 64.1.  Chest 

x-ray continues to show bilateral diffuse interstitial infiltrates.





Progress note dated 04/06/2021.





50-year-old male, admitted to the hospital on March 31.  He came in to the ICU 

on April 3.  Currently, the patient's on BiPAP with IPAP of 14, EPAP of 7.  FiO2

is 100%.  When he is not on BiPAP, the patient's on 15 L high flow nasal O2.  In

addition, when he is on the high flow nasal oxygen, he also has a nonrebreather 

mask on as well.  The patient's getting saline at 75 mL an hour.  White count 

14, hemoglobin 14.7, hematocrit 43.7, and platelet count 328,000.  D-dimer 1.76.

 Sodium 142, potassium 4.8, chlorides 112, CO2 26, anion gap 4, BUN and 

creatinine were 39 and 0.91.  C-reactive protein is down to 29.6.  Chest x-ray 

shows diffuse bilateral infiltrates.





The patient is seen today 04/07/2021 in follow-up in the intensive care unit.  

He is currently sitting up in bed.  Awake and alert.  Currently on 15 L high 

flow nasal cannula along with 100% nonrebreather mask.  He did not utilize the 

BiPAP last evening.  He has 0.9 normal saline at 75 ML's per hour.  Still 

dyspneic with minimal exertion.  Chest x-ray continues to show diffuse mixed 

interstitial and alveolar infiltrates.  Stable compared to previous.  Blood 

culture reveals no growth.  White count 13.6.  Hemoglobin 14.8.  Lymphocytes 

0.4.  Sodium 142.  Potassium 4.2.  Creatinine 0.86.  LDH 1127, C-reactive 

protein 17.6.  He remains on bronchodilators, IV Solu-Medrol, Lovenox, vitamin 

supplements.





The patient is seen today 04/08/2021 in follow-up in the intensive care unit.  

He is currently resting fairly comfortably in bed.  No significant events 

overnight.  He is maintaining O2 saturations in the upper 80s and low 90s on 15 

L high flow nasal cannula and addition to a nonrebreather mask.  0.9 normal 

saline at 75 mL per hour.  Chest x-ray shows no changes and bilateral 

infiltrates.  White count 13.3.  Hemoglobin 15.3.  Lymphocytes 0.4.  D-dimer 

3.36.  Sodium 138.  Potassium 4.3.  Creatinine 0.72.  C-reactive protein 14.4.  

He remains on Lovenox, IV Solu-Medrol, vitamin supplements.





The patient is seen today 04/09/2021 follow-up in the intensive care unit.  He 

is currently sitting up in bed.  Awake and alert.  In mild respiratory distress.

 Still quite dyspneic with minimal exertion.  Still with oxygen desaturations 

with minimal exertion.  He is currently on airflow high flow oxygen at 60 L/m 

and 90% FiO2 along with a nonrebreather mask to maintain O2 saturations 85-86%. 

Morning blood gases revealed a PaO2 of 49, pCO2 30 and a pH of 7.48.  0.9 normal

sinus 75 ML's per hour.  He has received a unit of convalescent plasma.  Blood 

cultures reveal no growth.  White count 12.2.  Hemoglobin 15.2.  Lymphocytes 

0.3.  D-dimer 2.84.  Sodium 138.  Potassium 4.3.  Creatinine 0.69.  LDH 1196.  

C-reactive protein 10.4.  Remains on IV Solu-Medrol, rhonchi dilators, Lovenox, 

vitamin supplements.





The patient is seen today 04/10/2021 follow-up in the intensive care unit.  He 

is currently sitting up in bed.  Awake and alert in no acute distress.  Still 

quite dyspneic with minimal exertion in conversation.  He remains on AirVo high 

flow oxygen at 60 L and 95% FiO2 along with a nonrebreather mask with O2 satura

tions in the mid 80s.  He did receive convalescent plasma and tocilizumab back 

on 04/03/2021.  He has a 0.9 normal saline at 75 mL per hour.  Yesterday's blood

gases revealed a pO2 of 49, pCO2 of 30 and a pH of 7.5.  Chest x-ray revealing 

slight improvement of bibasilar infiltrates.  Blood cultures reveal no growth.  

White count 12.7.  Hemoglobin 15.1.  D-dimer 2.47.  Sodium 135.  Potassium 4.3. 

Creatinine 0.74.  LDH 1213.  C-reactive protein 8.6.  Continued on Lovenox, IV 

Solu-Medrol, vitamin supplements.





The patient is seen today 04/11/2021 in follow-up in the intensive care unit.  

He remains sitting up in bed.  Awake and alert.  He is continued on AirVo high 

flow oxygen at 60 L and 95% FiO2 along with a nonrebreather mask.  O2 

saturations in the 80s.  He is 0.9 normal saline at 75 ML's per hour.  He 

received convalescent plasma 1.  Blood cultures reveal no growth.  White count 

11.1.  Hemoglobin 15.3.  Lymphocytes 0.4.  Sodium 134.  Potassium 4.4.  

Creatinine 0.71.  LDH 1172.  C-reactive protein 7.5.  He remains on Lovenox, 

Solu-Medrol, vitamin supplements. 





On 04/12/2021, I'm seeing this patient in follow-up.  The patient was Hospital 

as forCOVID 19  pneumonia and the patient progressive hypoxic respiratory 

failure.  The patient was initially admitted on 04/04/2021 and the patient is 

currently in the intensive care unit requiring high flow oxygen.  He is 

currently on high flow oxygen at 60 L with an FiO2 of 95% in addition to a 

nonrebreather facemask.  He is awake and alert.  He is short of breath with 

limited amount of activity and he easily desaturates.  No significant chest 

pain.  Cough with deep breathing.  The patient has already received convalescent

plasma.  The patient is currently on IV Solu-Medrol 60 mg every 6 hours of this 

in addition to multivitamins. He was also treated with Tocilizumab.  LDH level 

from yesterday was 1157 and the CRP was 8.1.  Chest x-ray still unchanged with 

diffuse bilateral pulmonary infiltrates.  This patient otherwise has been in 

good state of health.  No chronic illnesses otherwise.  He is being monitored 

very closely here in the intensive care unit due to concerns of progressive 

respiratory failure requiring intubation mechanical ventilation.  Blood cultures

of been negative thus far.





On 04/13/2021, the patient is being seen for a follow-up.  This is a case of 

Coumadin related pneumonia with hypoxic respiratory failure.  The patient was on

high flow oxygen yesterday and he was on 60 L with an FiO2 of 95%.  He was also 

using the nonrebreather facemask in addition to the high flow oxygen.  Was doing

some activity and movement yesterday, the patient desaturated down to the 60s 

and he was hard to recovered.  At this time, the patient became quite restless 

and agitated.  He was placed on a BiPAP briefly which made him more panicky 

uncomfortable.  Overnight, he was also started on Precedex which is currently 

running at 0.2 mcg/kg per minute.  Ultimately, he was taken off the BiPAP and 

the patient is currently back on high flow oxygen at 60 L with a 93% in addition

to 100% nonrebreather facemask.  His current pulse ox is order of 76-82 percent.

 The chest x-ray from today is showing bilateral lower lobe pulmonary 

infiltrates and compared to yesterday's chest x-ray, there is no major interval 

change.  Findings and essentially stable.  Meanwhile, his blood work from today 

is showing a white cell count of 15.8, he does have lymphopenia, renal function 

is stable, LDH level is 1-15 and the CRP is 7.4.  I also check a pro-calcitonin 

level on him yesterday and the level came back low at 0.08.  Meanwhile, the 

patient is currently on Solu-Medrol 60 mg every 6 hours he is also on Lovenox 40

mg subcu every 24 hours.  The patient remains on Precedex for now.  His calm and

comfortable and responsive.





04/14/2021, the patient is having difficulty with hypoxemia and worsening 

shortness of breath.  Note that after being on high flow oxygen for quite some 

time, the patient started showing some signs of decompensated yesterday.  His 

pulse ox was dropping in the mid and low 80s and he was very slow in recovering.

 Based on that, he was switched to a BiPAP which is currently running at a BiPAP

pressure of 14/5 cm of water with an FiO2 of 100%.  Despite all this, he remains

tachypneic.  The patient prior volume is above 1 L and his respiratory rate is 

in the mid 40s and is generating a very high risk ventilation even without the 

BiPAP.  He is quite tachypneic.  Pulse ox currently is in the low 70s.  He is on

Precedex.  He is very anxious.  His panicky.  He is restless. Precedex is 

running at micrograms per kilogram per minute.  As far as his treatment, the 

patient remains on IV Solu Medrol 60 mg every 6 hours.  He is also on Lovenox 40

mg subcu every 24 hours.  Chest x-ray from today is showing stable bilateral 

pulmonary infiltrates, essentially unchanged compared to yesterday.  Doppler of 

the lower extremities was done yesterday showed no evidence of DVT.  His 

inflammatory markers today is showing a d-dimer of 4.1 from yesterday.  LDH is 

01/04/2009, higher, CRP is 5.7, able.  Rest of the electrodes are stable, BUN is

40 with a creatinine of 0.8.  His white cell count is currently at 20 and 

hemoglobin is at 16.  His net fluid balance has been -2.6 L and the patient was 

given Lasix yesterday and the patient was Negative Fluid Balance.





04/15/2021 the patient is being seen for a follow-up.  He obviously not 

intubated yesterday as the patient calmed down considerably with utilization of 

Precedex and morphine.  Nevertheless, his sister status remains borderline and 

currently is still on a BiPAP at a pressure of 16/12 and FiO2 of 100% and the 

patient is also on Precedex and points Precedex at 0.7 mcg/kg per minute and 

morphine as needed.  He is not was essentially uneventful.  His current pulse ox

is around 89%.  The easily desaturates.  He had several events yesterday with hi

s pulse ox dropped to lower levels and is taking them quite some time until he 

recovers probably in the order of 30 minutes to 45 minutes.  During this time 

the patient becomes quite panicky restless and short of breath.  We have still 

avoided intubation on this patient and were continuing the supportive care with 

noninvasive positive pressure ventilation.  The chest x-ray remains stable 

without any interval change and there is some lower lobe at the pulmonary 

infiltrates.  The patient's white cell count is 18.4 with a hemoglobin of 16.8. 

His electrolytes are normal, renal function is showing a stable creatinine of 

0.9, rest of the inflammatory markers are still pending for now.  Fluid balance 

has been in the order of -2.6 L for yesterday and the patient is headed towards 

more negative fluid balance for today.  The patient remains on IV Solu-Medrol 60

mg every 6 hours.  He is on Lovenox therapeutic dose of 100 mg by mouth every 12

hours therapeutic dose as well as utilized as the patient was getting 

progressively more hypoxic and pulmonary embolism was a constellation.  The 

patient was unable to undergo a CT angiogram because of his very limited 

pulmonary reserve and borderline respiratory status.  Doppler of the lower 

extremities were obviously negative.  No signs of bleeding and was going to 

continue the therapy goes of Lovenox for another 24 hours.  D-dimer from today 

is pending for now.  Clinically, he is resting comfortably in bed.  His night 

was otherwise uneventful.  A Devlin catheter was also inserted yesterday is 

limited his mobility.





04/16/2021, the patient remains on a BiPAP of 16/12 cm of water with an FiO2 of 

100%.  His current respiratory rate is in the low 30s.  He seems to be 

comfortable, he had very much dependent on a BiPAP and the patient has limited 

reserve and easily desaturates.  His been on BiPAP for the past 3 days for now. 

This is making his nose irritated in the mouth dry.  The chest x-ray from today 

still pending.  Yesterday's chest x-ray was reviewed and the findings were 

essentially stable.  To control his increased level of anxiety and synchrony 

with the noninvasive positive pressure ventilator, and without Precedex which is

currently running at 0.7 mcg/kg per minute and this has done significant 

improvement in terms of lowering his respiratory rate and taking control of his 

anxiety.  He is on TPN for nutritional support for now.  He has a PICC line in 

his left upper extremity.  In terms of therapy, he remains on Lovenox 1 mg every

every 12 hours.  He remains on IV Solu Medrol 60 mg IV every 6 hours.  

Inflammatory markers on today's evaluation showed a d-dimer of 1.77, his LDH 

level is 1371 and his CRP level is at 6.3.  His electrolytes are all within 

normal limits.  His fluid balance over the past 24 hours is negative for 54 mL 

and the patient was being diuresed with Lasix.  He does not have a lazy 

extending dose for now.  He was also given Diflucan regarding the possibility of

some oropharyngeal candidiasis.  IV fluids currently running at 20 mL an hour.  

TPN is currently running at 30 mL an hour and the patient currently has a Devlin 

catheter in place.





04/17/2021, the patient is still on BiPAP.  He was on BiPAP overnight and is 

getting quite anxious and tired of being on the BiPAP.  Current BiPAP settings 

of 16/12 an FiO2 of 100%.  Current pulse ox is ranging between 84 and 87%.  He 

wants to try to high flow oxygen system again.  His white cell count is at 31.  

His d-dimer is at 1.5.  His LDH level is 1533 and his CRP level is at 0.5.  His 

chest x-ray from today is showing infiltrates in lung bases bilaterally, 

essentially unchanged compared to the yesterday's chest film.  I was told by the

nursing staff that even while receiving oral care.  He is still on Precedex at 

0.7 mcg/kg per minute.  He remains on IV Solu Medrol 60 mg every 6 hours.  I 

will today's condition essentially unchanged..  Have some oropharyngeal 

candidiasis for which she was started on Diflucan.  He remains on TPN for nutri

tional support which is running at 30 mL an hour.  He is awake and alert and is,

indicating.  Altered mentation.  Found the week.  Quite discouraged because of 

his ongoing respiratory failure.  Unable to use a incentive spirometer as the 

patient is currently strictly on BiPAP.  He is not tachycardic.  His current 

respiratory rate is in the mid 20s.  He is able to generate higher tidal volumes

and his minute ventilation continues to be quite elevated at 30





04/18/2021, the patient remains unchanged and this is quite discouraging as the 

patient is not doing any significant progress.  He remains very much BiPAP 

dependent.  Yesterday with ecchymosis the BiPAP for 5 minutes and he 

decompensated with his pulse ox dropped down to the low 60s and he became 

extremely tachypneic.  Even now, with movement and limited activity, the patient

desaturates and is taken in quite some time to recover.  His chest x-ray still 

showing bilateral lower lobe pulmonary infiltrates consistent with COVID-19 

related pneumonia/ARDS.  The patient remains on BiPAP this morning at a pressure

of 16/12 with an FiO2 of 100%.  LDH level remains elevated at 1486 and the CRP 

level is down to 0.7.  The d-dimer currently is at 1.6.  It is of treatment, the

patient is on IV Solu Medrol 60 mg every 6 hours.  He remains on Precedex at 0 

point have and microvascular kilogram per minute.  Precedex has made a much more

comfortable and less agitated.  He is awake.  Is arousable.  He cannot eat.  He 

is on TPN for nutritional support.  He was started on Diflucan for some 

oropharyngeal candidiasis.  TPN is running at the rate of 85 mL an hour.  In 

terms of his blood work and labs, white cell count was 25 and a platelet count 

was 198 slightly lower, his electrolytes are all within normal limits.  Total 

protein is down to 5 with albumin level of 2.6.  His triglyceride level is at 

379.  He is weak.  He is in bed most of the time.  Unable to stop on a chair.  

As mentioned, he carries a high risk of failing respiratory status requiring 

intubation mechanical ventilation.





04/19/2021, the patient is being seen in the intensive care unit.  This is a 

case of COVID-19 related pneumonia and ARDS.  At around midnight and on, the 

patient progressively become more hypoxic and his pulse ox dropped in the low 

70s.  At that point, the decision was to proceed with intubation mechanical 

ventilation.  Note that the patient was on BiPAP for several days and he was 

still hanging on with a pulse ox in the mid 80s and low 90s.  Nevertheless, 

overnight, he decompensated and he became more agitated tachycardic restless and

hypotensive.  As such, the decision was to proceed with intubation mechanical 

ventilation.  The patient is currently intubated.  Post intubation, we had 

significant difficulties with respiratory acidosis, hypotension, a synchrony 

with the mechanical ventilator and ongoing hypoxemia.  As such, the patient was 

immediately sedated and paralyzed.  Currently propofol is running at 75 mcg/kg 

per minute and fentanyl is running at 0.5 mcg/kg per minute.  Patient is also 

paralyzed with Nimbex at 2 mcg/kg per minute.  The patient is currently 

mechanically ventilated.  I did a lot of vent changes throughout the night and 

the most current vent setting includes an assist-control of 30 with an FiO2 of 

100% and a PEEP of 20 with a tidal volume of 350.  The patient's peak airway 

patient is 40.  Static pressure is 39.  The patient's chest x-ray showing 

extensive consolidation of the lower lobes more so on the right and as such I 

suspect a superimposed infection the right lower lobe.  Hemodynamically, the pat

ient received IV fluids and he received a total of 2 L and currently IV fluids 

running at 0.9 at 150 mL an hour.  I made recommendations to stop the TPN for 

now.  Meanwhile, the patient will be also covered with broad-spectrum 

antibiotics with a combination of cefepime, vancomycin and Eraxis.  Diflucan was

be discontinued.  Cultures will be sent.  The patient is oliguric and his skin 

is getting mottled and he is becoming more cyanotic.  In terms of labs, the most

recent blood gases available prior to the vent setting change includes a pH of 

7.06 with a pCO2 of 100 and pO2 of 58.  His blood work from this morning shows a

BUN of 32 with a creatinine of 0.7.  His white cell count is up to 55.3 with a 

hemoglobin of 16.7.  His LDH is up to 2094 and the CRP is at 3.6.  LFTs are also

on the rise.  Rest of the electrolytes are all within normal limits.  The 

patient is currently sedated and paralyzed, he is hemodynamically unstable in 

follow-up.   He is also on pressors and norepinephrine infusion is running at 

0.1 mcg/kg per minute.  Follow-up blood gases are pending for now.  





Patient was reevaluated today on 4/19/2021, patient was already seen by Dr. Cote, apparently around midnight the patient had a downhill course, became 

more hypoxic, and his O2 saturation dropped to the 70s.  Patient was intubated, 

he was initially on BiPAP, and now he is intubated and mechanically ventilated. 

Patient is now on assist control rate of 40, volume is 375 FiO2 100% PEEP of 20.

 ABG post intubation showed a pO2 of 70 pCO2 of 101 pH of 7.06.  Patient is on 

Nimbex, fentanyl, norepinephrine at 0.1, and propofol at 75.  Chest x-ray 

continues to show significant bilateral infiltrates worse on the right side.  

Considering his leukocytosis, patient was placed on vancomycin, cefepime, and on

Eraxis.  We change his TPN to enteral feeding.  And I plan to have a repeat ABG 

in 2 hours.  His IV fluid is down to 100 mL per hour, and I recommended a 1 L of

fluid boluses for low blood pressure in spite of norepinephrine.





Patient was reevaluated today on 4/20/2021, patient continues to do poorly t

jessica.  Remains intubated and mechanically ventilated.  He is on assist control 

rate of 40 tidal volume is 430 FiO2 on the percent PEEP of 20.  His peak airway 

pressure is very high, his static pressure is also high, hence I recommended 

switching the patient to a pressure control mode of mechanical ventilation, 

pressure control of 20, TI of 0.70, respiratory rate of 40, FiO2 100  percent, 

and PEEP of 18.  ABG today on earlier vent setting showed a pO2 of 72, pCO2 of 

98 pH of 6.96, adjustments were made with tidal volume earlier, but he was 

already on the rate of 40.  Nephrology was consulted, patient will be undergoing

dialysis as his potassium seems to be rising, earlier potassium was 7.1, bicarb 

was 22 BUN 47 creatinine 3.59.  Patient clearly has both respiratory and 

metabolic acidosis causing hyperkalemia.  His LDH today is 2300, CPK is 264, and

C-reactive protein is 7.3 chest x-ray continues to show bilateral patchy 

infiltrates, right more so than left patient remains on D5W with bicarbonate 

drip.  He is also on propofol, fentanyl, norepinephrine, and vasopressin.  He is

also on tube feeding vital Hb at 20 mL per hour.  Patient remains on vancomycin,

cefepime, and Eraxis.  Blood culture is positive for staph aureus, final 

sensitivity is pending, it is not clear whether this is MRSA or MSSA yet.  

Patient is on Vanco covering the finding.





Patient was reevaluated today on 4/21/2021, remains in the ICU, intubated, 

mechanically ventilated, sedated, and paralyzed.  Patient is on pressure control

mode of mechanical ventilation with pressure control of 20, respiratory rate of 

40 FiO2 100% PEEP of 18,ti 0.75.  ABG showed a pO2 of 57 pCO2 of 75 pH of 7.08. 

Patient is on amiodarone, Cardizem, norepinephrine, vasopressin, he is also on 

fentanyl, Nimbex, and propofol.  Overnight the patient had intermittent episodes

of atrial fibrillation and RVR, patient remains on cefepime and vancomycin and 

the Eraxis, blood cultures have been positive for staph aureus.  Sputum culture 

also positive for staph aureus, suspect MSSA.  Patient is well covered for the 

time being.  Patient remains on enteral feeding/Nepro after evaluating the 

patient, I kept him basically on the same medications, however his ventilator 

settings was switched to assist control rate of 40 tidal volume is 400 FiO2 100%

and PEEP was increased to 20.  Family came in, and I had a long discussion with 

the wife regarding his condition.  Made the wife aware that the patient is not 

doing well, and his mortality is over 95% considering the patient has multiorgan

involvement, including lungs, liver, kidneys, and his cardiac status.  Yearly 

the patient is extremely ill, critically ill, and mortality is over 95%.  Family

would like everything to continue, however I was able to change the CODE STATUS 

to DO NOT RESUSCITATE.  Patient is now DO NOT RESUSCITATE CODE STATUS.  All labs

today were reviewed, BUN is 44 creatinine 4.9.  Liver enzymes are elevated.  LDH

is elevated at 1645.  C-reactive protein is 6.2 ALT is 81, alkaline phosphatase 

is 187.





Patient was reevaluated today on 4/22/2021, remains intubated and mechanically 

ventilated.  Remains on propofol, norepinephrine, Nimbex, vasopressin, and his 

ABG is extremely low in spite of pressors on board including vasopressin and 

norepinephrine.  Wife is at bedside, still undecided about comfort care 

measures, although I believe she is about ready to let us proceed with comfort 

care measures since she is very aware of the poor clinical picture based on my 

discussion yesterday and based on my discussion with the wife today.  He is a DO

NOT RESUSCITATE CODE STATUS at present.  Patient is on assist control rate of 14

tidal volume is 400 FiO2 on the percent PEEP is 20 ABG showed a pO2 of 57 pCO2 

of 75 pH of 7.08.  Patient remains on bicarbonate drip.  His blood pressure is 

low in the 50s, wife refused having x-rays and labs today.  Patient is noted to 

have some coffee-ground emesis in the nasogastric tube he is on Protonix.














Objective





- Vital Signs


Vital signs: 


                                   Vital Signs











Temp  98.8 F   04/22/21 04:00


 


Pulse  89   04/22/21 11:30


 


Resp  40 H  04/22/21 11:30


 


BP  93/44   04/22/21 07:00


 


Pulse Ox  71 L  04/22/21 11:30








                                 Intake & Output











 04/21/21 04/22/21 04/22/21





 18:59 06:59 18:59


 


Intake Total 1278.748 2527.712 502.073


 


Output Total 0 0 0


 


Balance 3356.152 1749.712 502.073


 


Weight  125.6 kg 


 


Intake:   


 


  IV 1236 1236 412


 


    Sodium Chloride 0.9% 1, 1200 1200 400





    000 ml @ 100 mls/hr IV .   





    Q10H SHAZIA Rx#:[id number]   


 


    pressure bag 36 36 12


 


  Intake, IV Titration 695.076 664.712 90.073





  Amount   


 


    Cisatracurium 200 mg In 200  





    Sodium Chloride 0.9% 180   





    ml @ 1 MCG/KG/MIN 5.928   





    mls/hr IV .Q24H SHAZIA Rx#:   





    [id number]   


 


    Diltiazem 125 mg In 125  





    Sodium Chloride 0.9% 100   





    ml @ 10 MG/HR 10 mls/hr   





    IV .[id number] SHAZIA Rx#:   





    [id number]   


 


    Norepinephrine 32 mg In 61.76 217.916 





    Sodium Chloride 0.9% 218   





    ml @ 0.4 MCG/KG/MIN 19.   





    406 mls/hr IV .[id number]   





    SHAZIA Rx#:[id number]   


 


    fentaNYL (PF). 1,000 mcg 108.316 91.684 90.073





    In Sodium Chloride 0.9%   





    80 ml @ Per Protocol IV .   





    Q0M SHAZIA Rx#:[id number]   


 


    propofoL 1,000 mg In 200 355.112 





    Empty Bag 1 bag @ Titrate   





    IV .Q0M SHAZIA Rx#:   





    [id number]   


 


  Tube Feeding 0  


 


Output:   


 


  Urine 0 0 0


 


Other:   


 


  Voiding Method Indwelling Catheter Indwelling Catheter Indwelling Catheter








                       ABP, PAP, CO, CI - Last Documented











Arterial Blood Pressure        46/36

















- Exam








GENERAL EXAM: Revealed 50-year-old white male intubated and mechanically 

ventilated, sedated and paralyzed.


HEAD: Normocephalic.


EYES: Normal reaction of pupils, equal size.


NOSE: Clear with pink turbinates.


THROAT: No erythema or exudates.


NECK: No masses, no JVD.


CHEST: No chest wall deformity.


LUNGS: Symmetrical expansion crackles and rhonchi noted bilaterally.


CVS: Tachycardic, normal S1 and S2  no audible murmur, regular rhythm.


ABDOMEN: Soft nontender no megaly no rebound no guarding.


SKIN: No rashes


CENTRAL NERVOUS SYSTEM:  Sedated and paralyzed, cannot be assessed.


EXTREMITIES: There is no peripheral edema.  No clubbing, no cyanosis ,  

diminished pulses bilaterally





- Labs


CBC & Chem 7: 


                                 04/21/21 04:45





                                 04/21/21 05:00


Labs: 


                  Abnormal Lab Results - Last 24 Hours (Table)











  04/21/21 04/21/21 04/22/21 Range/Units





  05:00 05:00 05:33 


 


POC Glucose (mg/dL)    74 L  (75-99)  mg/dL


 


Ferritin  1379.4 H    (22.0-322.0)  ng/mL


 


Vitamin D 25-Hydroxy   13.0 L   (30.0-100.0)  ng/mL








                      Microbiology - Last 24 Hours (Table)











 04/19/21 09:42 Blood Culture Gram Stain - Final





 Blood Blood Culture - Final





    Staphylococcus aureus


 


 04/19/21 10:13 Gram Stain - Final





 Sputum Sputum Culture - Final





    Staphylococcus aureus














Assessment and Plan


Assessment: 





Impression:


Acute hypoxic respiratory failure secondary to COVID-19 pneumonia and ARDS.  

With impending death.


Acute septic shock is seriously considered, although hypovolemic shock is not 

entirely ruled out, but felt to be less likely.


Positive  MSSA bacteremia.


Elevated inflammatory markers seconded to COVID-19 infection.


Multisystem organ failure secondary to COVID-19 infection pneumonia and MSSA 

pneumonia and bacteremia.


Oropharyngeal candidiasis.


Profound medical debility and suspect critical illness polyneuropathy.


Acute kidney injury, likely secondary to ATN likely secondary to acute septic 

shock





Recommendation:


Discussed and updated the wife again today on his condition she is at bedside.


Continue ventilatory support. 


Continue hemodynamic support.  Continue vasopressin and norepinephrine.  Patient

is now maximized on both, and blood pressure is marginal.


Continue Decadron.


Continue cefepime and vancomycin and eraxis.  Sputum showed MSSA, blood cultures

are showing staph aureus, final sensitivity is pending.


Continue enteral feeding.


Continue sodium bicarb for his acidosis.


Patient received toci , and he received 1 unit of convalescent plasma.


Considering his blood pressure, patient will not tolerate hemodialysis.


Continue GI and DVT prophylaxis.


Clinical condition is worsening today, family is aware, patient remains DO NOT 

RESUSCITATE CODE STATUS, will follow.


Medical care time is over 30 minutes


Time with Patient: Greater than 30

## 2021-04-22 NOTE — P.PN
Subjective


Progress Note Date: 04/22/21


Malcom Griffith is a 49 yo M with PMH of allergies who presented to the ED via EMS

with worsening malaise and shortness of breath. He states he developed a cough 

and sore throat about 2 weeks ago which has worsened since then. He complains of

fever chills and shortness of breath. On arrival he was febrile tachypenic and 

hypoxic SpO2 94% on 4 L O2. WBC 5.8, lymphopenia, , , COVID 

positive, CXR with coarse bilateral infiltrates.





[id number] maintained on 90% airflow, O2 sats in the low 90s.  Positive 

nonproductive cough .  Outside window for Remdesevir, continue on steroids, 

vitamins,lovenox. Chest pain, palpitations, complains of chronic back pain.  T-

max 100.2.  Preliminary blood cultures reporting no growth at 24 hours





04/05/2021 status post convalescent plasma, TOCI, maintaining O2 sats in the 90s

on 100% BiPAP.  Chest x-ray reporting stable diffuse bilateral infiltrates.  

Afebrile, T-max 99.2, WBC 14.7.  Hemoglobin 15.7, platelets 321.  BUN 41, 

creatinine 0.94. D-dimer 1.45, ferritin 1511.4, LDH 1025, CRP 54.1.





04/06/2021 alternating between BiPAP and 15 L high flow nasal cannula combined 

with nonrebreather, maintaining O2 sats in the high 80s.  Chest x-ray reporting 

diffuse bilateral infiltrates stable. T-max 99,WBC 14.  Hemoglobin 14.7, 

platelets 328 D-dimer 1.76, ferritin and CRP decreased.  BUN 39, creatinine 

0.91.








04/07/2021 maintained on 15 L-nasal cannula with  nonrebreather mask, IV 

steroids, bronchodilators,maintaining O2 sats in the high 80s.  Did not Require 

BiPAP during the night.  Feels better.  Nonproductive cough.  Complains of mild 

chest tightness with coughing. Chest x-ray reporting stable diffuse bilateral 

infiltrate.  Afebrile, WBC 13.6.  Hemoglobin 14.8, platelets 300. BUN 33, 

creatinine 0.86.  Blood sugars controlled. LDH elevated, 1127, CRP down to 17.6.










04/08/2021  continue on IV steroids, vitamins, maintaining O2 sats in the mid to

high 80s on 15 L nasal cannula plus nonrebreather.  Nonproductive cough.  

Reports she feels better.  Chest x-ray reporting no change in bilateral 

infiltrates.  Afebrile.  Creatinine 0.72.





04/09/2021 Continues on airflow high flow oxygen at 60 L/m and 90% FiO2 along 

with a nonrebreather mask, maintaining O2 sats in the high 80s.   Minimal cough.

 Complains of dry mouth.  He reports, continues to feel better.  Afebrile, WBC 

down to 12.2.  D-dimer, CRP decreased.  LDH increased ,1196.





04/12/2021 maintained on high flow nasal cannula 60 L in addition to 

nonrebreather maintaining better O2 sats  in the high 90s to 100%.  Chest x-ray 

reporting stable bilateral infiltrates .complains of exertional shortness of 

breath. Afebrile, T-max 99.5, WBC 19.  D-dimer, ferritin, LDH  decreased, CRP 

8.1.  BUN 25, creatinine 0.75.  Scheduled to have midline placed today








04/13/2021 rough night, Precedex initiated and patient placed on BiPAP.  This 

morning ,desating down into the 60s, on BiPAP.FiO2 adjusted, with reported O2 

sats improved into the 80s.Chest x-ray reporting stable  patchy bilateral Inf

iltrates.  Doppler reporting negative for DVT of bilateral lower extremities.  

Inflammatory markers elevated.








04/14/2021 difficulty oxygenating yesterday, converted over to BiPAP, currently 

on 100% FiO2 maintaining O2 sats of 63 to low 80s. Unstable to travel for CTA. 

Maintained on Lovenox . Continues on Precedex, covid cocktail, including IV 

Solu-Medrol.Chest x-ray reporting similar to prior exam.  Received Lasix 

yesterday, diuresed well with 24-hour I&O reflecting a negative fluid balance.  

BUN 40, creatinine 0.88.  Afebrile, WBC 20. 








04/15/2021 anxiety significantly improved on Precedex and morphine.  Maintaining

O2 sats in the high 80s on 100% BiPAP pressure 16/12.  Chest x-ray reporting 

some slight improved aeration at the right lung base.  





Therapeutic dosed Lovenox; unstable to travel for CTA to rule out PE .Afebrile, 

WBC 18.4.  Diuresing and remains in a negative fluid balance.  BUN 50, 

creatinine 0.99.








04/16/2021 continues to require BiPAP 100% FiO2, maintaining O2 sats high 80s to

low 90s.  D-dimer 1.77. therapeutic dosed Lovenox decreased. LDH 1371, CRP 6.3. 

24-hour I&O remains in a negative fluid balance. BUN 47, creatinine 0.78.   TPN 

for nutritional support ,Blood sugars controlled.T bili 1.4, ALT 1:15.  

Afebrile, WBC 21.3.








04/19/2021 worsening respiratory status with hypoxia throughout the night, 

requiring intubation in the early morning hours. Vent dependent, FiO2 100%, FiO2

20 %, ABGs noted.  Chest x-ray reporting unchanged patchy bibasilar opacities 

right greater than left.  Maintained on Nimbex, fentanyl, Levophed and diprovan 

drips and fluid bolus.  WBC up to 55.3 ,continues on Eraxis, cefepime and 

vancomycin.  Serial ABGs noted.








04/20/2021 vent-dependent, FiO2 100%, PEEP 20.  Sedated on fentanyl, diprovan. 

Requiring pressor support with both Levophed and vasopressin.  Critical 

potassium 7.1, bicarb 22, BUN 47, creatinine 3.59.  Received hyperkalemic 

cocktail, potassium down to 6.1, nephrology consulted.  Bicarb drip initiated.  

Vascular surgery consulted for emergent dialysis catheter placement.  Maintained

 on vancomycin, cefepime, and Eraxis,blood culture positive for staph aureus, 

final sensitivity is pending, sputum pending. Chest x-ray reporting similar 

findings.  T-max 101.3 WBC 52.4.  D-dimer 3.51, LDH 2300, , CRP 7.3.








04/21/2021 clinically worsening. In and out of atrial fibrillation with RVR with

heart rates up into the 160s, blood pressure unable to tolerate.  Maintained on 

amiodarone ,Cardizem, Levophed, and vasopressin drips. Blood pressures currently

in the high 70s.  Bacteremic , staph aureus in both preliminary blood cultures 

and sputum .Continues on Eraxis, cefepime, vancomycin as per ID.  T-max 100.4, 

WBC down to 40.3.  Remains vent dependent, FiO2 100% +18 Peep.  Requiring both 

sedation and paralytic, on diprovan, fentanyl and Nimbex drips. O2 sats 80-83%. 

ABGs noted, continues on bicarb drip.  Received emergent dialysis with potassium

down to 5.2, BUN 44, creatinine 4.9.  Magnesium 1.5.





04/22/2021 continues to further decline.  Bicarb drip  maintained. Telemetry 

sinus rhythm, amiodarone and Cardizem drips discontinued, continues on 

vasopressin and norepinephrine ,systolic blood pressures worsened down into the 

40s.  Remains vent dependent on 100+ FiO2, +20. Sedated and on paralytics.   NG 

tube with coffee-ground emesis present.  No labs today as wife declined.





Objective





- Vital Signs


Vital signs: 


                                   Vital Signs











Temp  98.8 F   04/22/21 04:00


 


Pulse  90   04/22/21 07:00


 


Resp  40 H  04/22/21 07:00


 


BP  93/44   04/22/21 07:00


 


Pulse Ox  75 L  04/22/21 07:00








                                 Intake & Output











 04/21/21 04/22/21 04/22/21





 18:59 06:59 18:59


 


Intake Total 3172.301 5878.712 193.073


 


Output Total 0 0 0


 


Balance 6410.606 6480.712 193.073


 


Weight  125.6 kg 


 


Intake:   


 


  IV 1236 1236 103


 


    Sodium Chloride 0.9% 1, 1200 1200 100





    000 ml @ 100 mls/hr IV .   





    Q10H SHAZIA Rx#:[id number]   


 


    pressure bag 36 36 3


 


  Intake, IV Titration 695.076 664.712 90.073





  Amount   


 


    Cisatracurium 200 mg In 200  





    Sodium Chloride 0.9% 180   





    ml @ 1 MCG/KG/MIN 5.928   





    mls/hr IV .Q24H SHAZIA Rx#:   





    [id number]   


 


    Diltiazem 125 mg In 125  





    Sodium Chloride 0.9% 100   





    ml @ 10 MG/HR 10 mls/hr   





    IV .A65U24H SHAZIA Rx#:   





    [id number]   


 


    Norepinephrine 32 mg In 61.76 217.916 





    Sodium Chloride 0.9% 218   





    ml @ 0.4 MCG/KG/MIN 19.   





    406 mls/hr IV .[id number]   





    SHAZIA Rx#:[id number]   


 


    fentaNYL (PF). 1,000 mcg 108.316 91.684 90.073





    In Sodium Chloride 0.9%   





    80 ml @ Per Protocol IV .   





    Q0M SHAZIA Rx#:[id number]   


 


    propofoL 1,000 mg In 200 355.112 





    Empty Bag 1 bag @ Titrate   





    IV .Q0M SHAZIA Rx#:   





    [id number]   


 


  Tube Feeding 0  


 


Output:   


 


  Urine 0 0 0


 


Other:   


 


  Voiding Method Indwelling Catheter Indwelling Catheter 








                       ABP, PAP, CO, CI - Last Documented











Arterial Blood Pressure        52/38

















- Exam


Limited exam secondary to a intubated covid patient ,full exam deferred to 

intensivist





General: Sitting up in bed, intubated, sedated and on paralytics


CV: Regular


Neuro: Unable to assess, patient on paralytics,sedated and on mechanical 

ventilation











- Labs


CBC & Chem 7: 


                                 04/21/21 04:45





                                 04/21/21 05:00


Labs: 


                  Abnormal Lab Results - Last 24 Hours (Table)











  04/21/21 04/21/21 04/22/21 Range/Units





  05:00 05:00 05:33 


 


POC Glucose (mg/dL)    74 L  (75-99)  mg/dL


 


Ferritin  1379.4 H    (22.0-322.0)  ng/mL


 


Vitamin D 25-Hydroxy   13.0 L   (30.0-100.0)  ng/mL








                      Microbiology - Last 24 Hours (Table)











 04/19/21 09:42 Blood Culture Gram Stain - Final





 Blood Blood Culture - Final





    Staphylococcus aureus


 


 04/19/21 10:13 Gram Stain - Final





 Sputum Sputum Culture - Final





    Staphylococcus aureus














Assessment and Plan


Assessment: 


Severe sepsis, septic shock secondary to Covid pneumonia, MSSA pneumonia, MSSA 

bacteremia. beyond window for Remdesevir. Possible PE, unstable to transfer for 

CTA.





Acute hypoxic respiratory failure secondary to the above, mechanical vent 

dependent





Multisystem organ failure secondary to sepsis related to #1





Cytopenia release syndrome, grade 4 secondary to #1.  Prognosis poor.





Paroxysmal A. fib with RVR





Bacteremia, MSSA 





Worsening leukocytosis, suspect related to #1 and to bacteremia





Possible hypovolemic shock, pressor dependent





Acute renal failure, related to acute septic shock, ATN, status post emergent 

dialysis catheter placement , hemodialysis initiated





Critical hyperkalemia secondary to the above, improving





Metabolic and respiratory acidosis, on bicarb drip





Gastroesophageal reflux disease





Chronic low back pain





Former nicotine dependence





Medical debility, critical illness polyneuropathy





No code




















Plan: Continue on current medication regime ,monitoring and symptomatic 

treatment.ICU management as per pulmonary/intensivist. Wife updated this morning

at bedside by PCP,considering comfort care- declined xrays and labs today. 

Prognosis poor given multiple complex medical issues. 

















The impression and plan of care has been dictated as directed.





:


I performed a history and examination of this patient,  discussed the same with 

the dictator.  I agree with the dictator's note ,documented as a scribe.  Any 

additional findings or plans will be noted.

## 2021-04-29 NOTE — ECHOF
Referral Reason:re: LV function



MEASUREMENTS

--------

HEIGHT: 185.4 cm

WEIGHT: 112.0 kg

BP: 88/48

RVIDd:   4.4 cm     (< 3.3)

IVSd:   1.6 cm     (0.6 - 1.1)

LVIDd:   4.1 cm     (3.9 - 5.3)

LVPWd:   1.6 cm     (0.6 - 1.1)

IVSs:   2.1 cm

LVIDs:   2.4 cm

LVPWs:   1.5 cm

LA Diam:   4.0 cm     (2.7 - 3.8)

Ao Diam:   3.2 cm     (2.0 - 3.7)

AV Cusp:   2.3 cm     (1.5 - 2.6)

AV maxP.55 mmHg

AV meanP.40 mmHg

RAP:   20.00 mmHg

RVSP:   57.91 mmHg







FINDINGS

--------

Resting tachycardia (HR>100bpm).

This was a technically difficult study with suboptimal views.

The left ventricular size is normal.   There is moderate concentric left ventricular hypertrophy.   O
verall left ventricular systolic function is normal with, an EF between 60 - 65 %.

The right ventricle is severely enlarged.

The left atrium is normal in size.

The right atrium was not well visualized.

5 ml of Lumason was utilized for enhancement of images.

Interatrial and interventricular septum intact.

The aortic valve was not well visualized.   Peak/mean gradient across the Aortic Valve is 17.55mmHg /
 6.40mmHg.

The mitral valve was not well visualized.

Mild tricuspid regurgitation present.   There is moderate pulmonary hypertension.   The right ventric
ular systolic pressure, as measured by Doppler, is 57.91mmHg.

The pulmonic valve was not well visualized.

The aortic root size is normal.

The inferior vena cava is dilated with no significant inspiratory collapse which is consistent estima
jaison right atrial pressure of >20 mmHg.

There is no pericardial effusion.



CONCLUSIONS

--------

1. This was a technically difficult study with suboptimal views.

2. There is moderate concentric left ventricular hypertrophy.

3. Overall left ventricular systolic function is normal with, an EF between 60 - 65 %.

4. The right ventricle is severely enlarged.

5. 5 ml of Lumason was utilized for enhancement of images.

6. Peak/mean gradient across the Aortic Valve is 17.55mmHg / 6.40mmHg.

7. Mild tricuspid regurgitation present.

8. There is moderate pulmonary hypertension.

9. The inferior vena cava is dilated with no significant inspiratory collapse which is consistent est
imated right atrial pressure of >20 mmHg.

10. There is no pericardial effusion.





SONOGRAPHER: Tracy Hamilton RDCS